# Patient Record
Sex: FEMALE | Race: WHITE | NOT HISPANIC OR LATINO
[De-identification: names, ages, dates, MRNs, and addresses within clinical notes are randomized per-mention and may not be internally consistent; named-entity substitution may affect disease eponyms.]

---

## 2017-01-09 ENCOUNTER — APPOINTMENT (OUTPATIENT)
Dept: ORTHOPEDIC SURGERY | Facility: CLINIC | Age: 57
End: 2017-01-09

## 2017-01-09 VITALS
DIASTOLIC BLOOD PRESSURE: 84 MMHG | BODY MASS INDEX: 39.65 KG/M2 | WEIGHT: 210 LBS | HEART RATE: 70 BPM | SYSTOLIC BLOOD PRESSURE: 117 MMHG | HEIGHT: 61 IN

## 2017-02-15 ENCOUNTER — OUTPATIENT (OUTPATIENT)
Dept: OUTPATIENT SERVICES | Facility: HOSPITAL | Age: 57
LOS: 1 days | End: 2017-02-15
Payer: MEDICAID

## 2017-02-15 VITALS
HEART RATE: 76 BPM | HEIGHT: 61 IN | RESPIRATION RATE: 16 BRPM | WEIGHT: 213.85 LBS | TEMPERATURE: 98 F | DIASTOLIC BLOOD PRESSURE: 82 MMHG | SYSTOLIC BLOOD PRESSURE: 140 MMHG

## 2017-02-15 DIAGNOSIS — Z01.818 ENCOUNTER FOR OTHER PREPROCEDURAL EXAMINATION: ICD-10-CM

## 2017-02-15 DIAGNOSIS — K46.9 UNSPECIFIED ABDOMINAL HERNIA WITHOUT OBSTRUCTION OR GANGRENE: Chronic | ICD-10-CM

## 2017-02-15 DIAGNOSIS — Z98.89 OTHER SPECIFIED POSTPROCEDURAL STATES: Chronic | ICD-10-CM

## 2017-02-15 DIAGNOSIS — S82.209P UNSPECIFIED FRACTURE OF SHAFT OF UNSPECIFIED TIBIA, SUBSEQUENT ENCOUNTER FOR CLOSED FRACTURE WITH MALUNION: Chronic | ICD-10-CM

## 2017-02-15 DIAGNOSIS — M17.11 UNILATERAL PRIMARY OSTEOARTHRITIS, RIGHT KNEE: ICD-10-CM

## 2017-02-15 DIAGNOSIS — Z90.49 ACQUIRED ABSENCE OF OTHER SPECIFIED PARTS OF DIGESTIVE TRACT: Chronic | ICD-10-CM

## 2017-02-15 LAB
ALBUMIN SERPL ELPH-MCNC: 4.2 G/DL — SIGNIFICANT CHANGE UP (ref 3.3–5.2)
ALP SERPL-CCNC: 161 U/L — HIGH (ref 40–120)
ALT FLD-CCNC: 28 U/L — SIGNIFICANT CHANGE UP
ANION GAP SERPL CALC-SCNC: 13 MMOL/L — SIGNIFICANT CHANGE UP (ref 5–17)
APTT BLD: 31 SEC — SIGNIFICANT CHANGE UP (ref 27.5–37.4)
AST SERPL-CCNC: 20 U/L — SIGNIFICANT CHANGE UP
BASOPHILS # BLD AUTO: 0 K/UL — SIGNIFICANT CHANGE UP (ref 0–0.2)
BASOPHILS NFR BLD AUTO: 0.5 % — SIGNIFICANT CHANGE UP (ref 0–2)
BILIRUB SERPL-MCNC: 0.4 MG/DL — SIGNIFICANT CHANGE UP (ref 0.4–2)
BLD GP AB SCN SERPL QL: SIGNIFICANT CHANGE UP
BUN SERPL-MCNC: 14 MG/DL — SIGNIFICANT CHANGE UP (ref 8–20)
CALCIUM SERPL-MCNC: 9.2 MG/DL — SIGNIFICANT CHANGE UP (ref 8.6–10.2)
CHLORIDE SERPL-SCNC: 100 MMOL/L — SIGNIFICANT CHANGE UP (ref 98–107)
CO2 SERPL-SCNC: 26 MMOL/L — SIGNIFICANT CHANGE UP (ref 22–29)
CREAT SERPL-MCNC: 0.48 MG/DL — LOW (ref 0.5–1.3)
EOSINOPHIL # BLD AUTO: 0.1 K/UL — SIGNIFICANT CHANGE UP (ref 0–0.5)
EOSINOPHIL NFR BLD AUTO: 1.5 % — SIGNIFICANT CHANGE UP (ref 0–6)
GLUCOSE SERPL-MCNC: 114 MG/DL — SIGNIFICANT CHANGE UP (ref 70–115)
HCT VFR BLD CALC: 43.2 % — SIGNIFICANT CHANGE UP (ref 37–47)
HGB BLD-MCNC: 14.7 G/DL — SIGNIFICANT CHANGE UP (ref 12–16)
INR BLD: 0.95 RATIO — SIGNIFICANT CHANGE UP (ref 0.88–1.16)
LYMPHOCYTES # BLD AUTO: 2 K/UL — SIGNIFICANT CHANGE UP (ref 1–4.8)
LYMPHOCYTES # BLD AUTO: 24.9 % — SIGNIFICANT CHANGE UP (ref 20–55)
MCHC RBC-ENTMCNC: 29.9 PG — SIGNIFICANT CHANGE UP (ref 27–31)
MCHC RBC-ENTMCNC: 34 G/DL — SIGNIFICANT CHANGE UP (ref 32–36)
MCV RBC AUTO: 88 FL — SIGNIFICANT CHANGE UP (ref 81–99)
MONOCYTES # BLD AUTO: 0.4 K/UL — SIGNIFICANT CHANGE UP (ref 0–0.8)
MONOCYTES NFR BLD AUTO: 5.2 % — SIGNIFICANT CHANGE UP (ref 3–10)
MRSA PCR RESULT.: SIGNIFICANT CHANGE UP
NEUTROPHILS # BLD AUTO: 5.5 K/UL — SIGNIFICANT CHANGE UP (ref 1.8–8)
NEUTROPHILS NFR BLD AUTO: 67.5 % — SIGNIFICANT CHANGE UP (ref 37–73)
PLATELET # BLD AUTO: 289 K/UL — SIGNIFICANT CHANGE UP (ref 150–400)
POTASSIUM SERPL-MCNC: 4.3 MMOL/L — SIGNIFICANT CHANGE UP (ref 3.5–5.3)
POTASSIUM SERPL-SCNC: 4.3 MMOL/L — SIGNIFICANT CHANGE UP (ref 3.5–5.3)
PROT SERPL-MCNC: 7.3 G/DL — SIGNIFICANT CHANGE UP (ref 6.6–8.7)
PROTHROM AB SERPL-ACNC: 10.4 SEC — SIGNIFICANT CHANGE UP (ref 10–13.1)
RBC # BLD: 4.91 M/UL — SIGNIFICANT CHANGE UP (ref 4.4–5.2)
RBC # FLD: 13.8 % — SIGNIFICANT CHANGE UP (ref 11–15.6)
S AUREUS DNA NOSE QL NAA+PROBE: SIGNIFICANT CHANGE UP
SODIUM SERPL-SCNC: 139 MMOL/L — SIGNIFICANT CHANGE UP (ref 135–145)
TYPE + AB SCN PNL BLD: SIGNIFICANT CHANGE UP
WBC # BLD: 8.1 K/UL — SIGNIFICANT CHANGE UP (ref 4.8–10.8)
WBC # FLD AUTO: 8.1 K/UL — SIGNIFICANT CHANGE UP (ref 4.8–10.8)

## 2017-02-15 PROCEDURE — 85730 THROMBOPLASTIN TIME PARTIAL: CPT

## 2017-02-15 PROCEDURE — 85610 PROTHROMBIN TIME: CPT

## 2017-02-15 PROCEDURE — 80053 COMPREHEN METABOLIC PANEL: CPT

## 2017-02-15 PROCEDURE — 85027 COMPLETE CBC AUTOMATED: CPT

## 2017-02-15 PROCEDURE — 87641 MR-STAPH DNA AMP PROBE: CPT

## 2017-02-15 PROCEDURE — 93010 ELECTROCARDIOGRAM REPORT: CPT

## 2017-02-15 PROCEDURE — 86850 RBC ANTIBODY SCREEN: CPT

## 2017-02-15 PROCEDURE — G0463: CPT

## 2017-02-15 PROCEDURE — 93005 ELECTROCARDIOGRAM TRACING: CPT

## 2017-02-15 PROCEDURE — 87640 STAPH A DNA AMP PROBE: CPT

## 2017-02-15 PROCEDURE — 86900 BLOOD TYPING SEROLOGIC ABO: CPT

## 2017-02-15 PROCEDURE — 86901 BLOOD TYPING SEROLOGIC RH(D): CPT

## 2017-02-15 NOTE — H&P PST ADULT - NSANTHOSAYNRD_GEN_A_CORE
No. RAY screening performed.  STOP BANG Legend: 0-2 = LOW Risk; 3-4 = INTERMEDIATE Risk; 5-8 = HIGH Risk

## 2017-02-15 NOTE — PATIENT PROFILE ADULT. - LEARNING ASSESSMENT (PATIENT) ADDITIONAL COMMENTS
Instructed pt on pre-op instructions, tips for safer surgery, pain management scale and pre-surgical infection prevention instructions given to pt and verbalized understanding of all. Ebola Screening: negative,

## 2017-02-15 NOTE — H&P PST ADULT - REASON FOR ADMISSION
I broke my tibia in April and I was still having a lot of pain I had surgery to take the metal out and the knee is shot.

## 2017-02-15 NOTE — PATIENT PROFILE ADULT. - PSH
Abdominal hernia    Closed fracture of tibia with malunion  s/p surgical repair; hardware removed 11/2016  H/O excision of mass  cysts right hand, R shoulder, L leg, R leg  History of cholecystectomy    History of colon surgery    S/P arthroscopic knee surgery  bilat  S/P carpal tunnel release  bilat  Status post left breast lumpectomy

## 2017-02-15 NOTE — H&P PST ADULT - FAMILY HISTORY
<<-----Click on this checkbox to enter Family History Family history of peritoneal cancer     Family history of prostate cancer     Child  Still living? Yes, Estimated age: 21-30  Family history of Hodgkin's lymphoma, Age at diagnosis: 11-20

## 2017-02-15 NOTE — PATIENT PROFILE ADULT. - VISION (WITH CORRECTIVE LENSES IF THE PATIENT USUALLY WEARS THEM):
Normal vision: sees adequately in most situations; can see medication labels, newsprint/glasses - distance

## 2017-02-15 NOTE — PATIENT PROFILE ADULT. - PRO PAIN LIFE ADAPT
withdrawal from social contact/inability to work/decreased activity level/inability or reluctance to perform ADLs/inability to enjoy life/inability to sleep/inability to concentrate

## 2017-02-16 DIAGNOSIS — M17.11 UNILATERAL PRIMARY OSTEOARTHRITIS, RIGHT KNEE: ICD-10-CM

## 2017-02-16 DIAGNOSIS — Z01.818 ENCOUNTER FOR OTHER PREPROCEDURAL EXAMINATION: ICD-10-CM

## 2017-02-17 ENCOUNTER — OTHER (OUTPATIENT)
Age: 57
End: 2017-02-17

## 2017-02-23 RX ORDER — GABAPENTIN 400 MG/1
300 CAPSULE ORAL ONCE
Qty: 0 | Refills: 0 | Status: COMPLETED | OUTPATIENT
Start: 2017-03-08 | End: 2017-03-08

## 2017-02-23 RX ORDER — OXYCODONE HYDROCHLORIDE 5 MG/1
20 TABLET ORAL ONCE
Qty: 0 | Refills: 0 | Status: DISCONTINUED | OUTPATIENT
Start: 2017-03-08 | End: 2017-03-08

## 2017-02-23 RX ORDER — CELECOXIB 200 MG/1
400 CAPSULE ORAL ONCE
Qty: 0 | Refills: 0 | Status: COMPLETED | OUTPATIENT
Start: 2017-03-08 | End: 2017-03-08

## 2017-02-23 RX ORDER — SCOPALAMINE 1 MG/3D
1.5 PATCH, EXTENDED RELEASE TRANSDERMAL ONCE
Qty: 0 | Refills: 0 | Status: COMPLETED | OUTPATIENT
Start: 2017-03-08 | End: 2017-03-08

## 2017-02-24 ENCOUNTER — APPOINTMENT (OUTPATIENT)
Dept: ORTHOPEDIC SURGERY | Facility: CLINIC | Age: 57
End: 2017-02-24

## 2017-03-06 ENCOUNTER — APPOINTMENT (OUTPATIENT)
Dept: ULTRASOUND IMAGING | Facility: CLINIC | Age: 57
End: 2017-03-06

## 2017-03-06 ENCOUNTER — OUTPATIENT (OUTPATIENT)
Dept: OUTPATIENT SERVICES | Facility: HOSPITAL | Age: 57
LOS: 1 days | End: 2017-03-06
Payer: MEDICAID

## 2017-03-06 ENCOUNTER — APPOINTMENT (OUTPATIENT)
Dept: MAMMOGRAPHY | Facility: CLINIC | Age: 57
End: 2017-03-06

## 2017-03-06 DIAGNOSIS — Z98.89 OTHER SPECIFIED POSTPROCEDURAL STATES: Chronic | ICD-10-CM

## 2017-03-06 DIAGNOSIS — S82.209P UNSPECIFIED FRACTURE OF SHAFT OF UNSPECIFIED TIBIA, SUBSEQUENT ENCOUNTER FOR CLOSED FRACTURE WITH MALUNION: Chronic | ICD-10-CM

## 2017-03-06 DIAGNOSIS — Z90.49 ACQUIRED ABSENCE OF OTHER SPECIFIED PARTS OF DIGESTIVE TRACT: Chronic | ICD-10-CM

## 2017-03-06 DIAGNOSIS — K46.9 UNSPECIFIED ABDOMINAL HERNIA WITHOUT OBSTRUCTION OR GANGRENE: Chronic | ICD-10-CM

## 2017-03-06 DIAGNOSIS — Z00.8 ENCOUNTER FOR OTHER GENERAL EXAMINATION: ICD-10-CM

## 2017-03-07 ENCOUNTER — RESULT REVIEW (OUTPATIENT)
Age: 57
End: 2017-03-07

## 2017-03-08 ENCOUNTER — APPOINTMENT (OUTPATIENT)
Dept: ORTHOPEDIC SURGERY | Facility: HOSPITAL | Age: 57
End: 2017-03-08

## 2017-03-08 ENCOUNTER — TRANSCRIPTION ENCOUNTER (OUTPATIENT)
Age: 57
End: 2017-03-08

## 2017-03-08 ENCOUNTER — INPATIENT (INPATIENT)
Facility: HOSPITAL | Age: 57
LOS: 0 days | Discharge: HOME | DRG: 470 | End: 2017-03-09
Attending: ORTHOPAEDIC SURGERY | Admitting: ORTHOPAEDIC SURGERY
Payer: MEDICARE

## 2017-03-08 VITALS
HEART RATE: 64 BPM | SYSTOLIC BLOOD PRESSURE: 126 MMHG | DIASTOLIC BLOOD PRESSURE: 75 MMHG | HEIGHT: 61 IN | WEIGHT: 213.85 LBS | RESPIRATION RATE: 16 BRPM | OXYGEN SATURATION: 98 % | TEMPERATURE: 97 F

## 2017-03-08 DIAGNOSIS — M17.5 OTHER UNILATERAL SECONDARY OSTEOARTHRITIS OF KNEE: ICD-10-CM

## 2017-03-08 DIAGNOSIS — Z98.89 OTHER SPECIFIED POSTPROCEDURAL STATES: Chronic | ICD-10-CM

## 2017-03-08 DIAGNOSIS — Z90.49 ACQUIRED ABSENCE OF OTHER SPECIFIED PARTS OF DIGESTIVE TRACT: Chronic | ICD-10-CM

## 2017-03-08 DIAGNOSIS — S82.209P UNSPECIFIED FRACTURE OF SHAFT OF UNSPECIFIED TIBIA, SUBSEQUENT ENCOUNTER FOR CLOSED FRACTURE WITH MALUNION: Chronic | ICD-10-CM

## 2017-03-08 DIAGNOSIS — M17.11 UNILATERAL PRIMARY OSTEOARTHRITIS, RIGHT KNEE: ICD-10-CM

## 2017-03-08 DIAGNOSIS — M06.9 RHEUMATOID ARTHRITIS, UNSPECIFIED: ICD-10-CM

## 2017-03-08 DIAGNOSIS — F41.9 ANXIETY DISORDER, UNSPECIFIED: ICD-10-CM

## 2017-03-08 DIAGNOSIS — K46.9 UNSPECIFIED ABDOMINAL HERNIA WITHOUT OBSTRUCTION OR GANGRENE: Chronic | ICD-10-CM

## 2017-03-08 LAB
BLD GP AB SCN SERPL QL: SIGNIFICANT CHANGE UP
TYPE + AB SCN PNL BLD: SIGNIFICANT CHANGE UP

## 2017-03-08 PROCEDURE — 20680 REMOVAL OF IMPLANT DEEP: CPT | Mod: 59

## 2017-03-08 PROCEDURE — 88305 TISSUE EXAM BY PATHOLOGIST: CPT | Mod: 26

## 2017-03-08 PROCEDURE — 88300 SURGICAL PATH GROSS: CPT | Mod: 26,59

## 2017-03-08 PROCEDURE — 73560 X-RAY EXAM OF KNEE 1 OR 2: CPT | Mod: 26,RT

## 2017-03-08 PROCEDURE — 88311 DECALCIFY TISSUE: CPT | Mod: 26

## 2017-03-08 PROCEDURE — 99222 1ST HOSP IP/OBS MODERATE 55: CPT

## 2017-03-08 PROCEDURE — 27447 TOTAL KNEE ARTHROPLASTY: CPT | Mod: AS,RT

## 2017-03-08 PROCEDURE — 76000 FLUOROSCOPY <1 HR PHYS/QHP: CPT | Mod: 26,59

## 2017-03-08 PROCEDURE — 27447 TOTAL KNEE ARTHROPLASTY: CPT | Mod: RT

## 2017-03-08 RX ORDER — SODIUM CHLORIDE 9 MG/ML
1000 INJECTION, SOLUTION INTRAVENOUS
Qty: 0 | Refills: 0 | Status: DISCONTINUED | OUTPATIENT
Start: 2017-03-08 | End: 2017-03-08

## 2017-03-08 RX ORDER — FENTANYL CITRATE 50 UG/ML
25 INJECTION INTRAVENOUS
Qty: 0 | Refills: 0 | Status: DISCONTINUED | OUTPATIENT
Start: 2017-03-08 | End: 2017-03-08

## 2017-03-08 RX ORDER — CEFAZOLIN SODIUM 1 G
2000 VIAL (EA) INJECTION ONCE
Qty: 0 | Refills: 0 | Status: DISCONTINUED | OUTPATIENT
Start: 2017-03-08 | End: 2017-03-08

## 2017-03-08 RX ORDER — FERROUS SULFATE 325(65) MG
325 TABLET ORAL
Qty: 0 | Refills: 0 | Status: DISCONTINUED | OUTPATIENT
Start: 2017-03-08 | End: 2017-03-09

## 2017-03-08 RX ORDER — ACETAMINOPHEN 500 MG
975 TABLET ORAL EVERY 8 HOURS
Qty: 0 | Refills: 0 | Status: DISCONTINUED | OUTPATIENT
Start: 2017-03-09 | End: 2017-03-09

## 2017-03-08 RX ORDER — CELECOXIB 200 MG/1
200 CAPSULE ORAL
Qty: 0 | Refills: 0 | Status: DISCONTINUED | OUTPATIENT
Start: 2017-03-09 | End: 2017-03-09

## 2017-03-08 RX ORDER — CEFAZOLIN SODIUM 1 G
2000 VIAL (EA) INJECTION
Qty: 0 | Refills: 0 | Status: COMPLETED | OUTPATIENT
Start: 2017-03-08 | End: 2017-03-08

## 2017-03-08 RX ORDER — SENNA PLUS 8.6 MG/1
2 TABLET ORAL AT BEDTIME
Qty: 0 | Refills: 0 | Status: DISCONTINUED | OUTPATIENT
Start: 2017-03-08 | End: 2017-03-09

## 2017-03-08 RX ORDER — TRANEXAMIC ACID 100 MG/ML
950 INJECTION, SOLUTION INTRAVENOUS ONCE
Qty: 0 | Refills: 0 | Status: DISCONTINUED | OUTPATIENT
Start: 2017-03-08 | End: 2017-03-08

## 2017-03-08 RX ORDER — ACETAMINOPHEN 500 MG
1000 TABLET ORAL
Qty: 0 | Refills: 0 | Status: COMPLETED | OUTPATIENT
Start: 2017-03-08 | End: 2017-03-09

## 2017-03-08 RX ORDER — ESCITALOPRAM OXALATE 10 MG/1
20 TABLET, FILM COATED ORAL DAILY
Qty: 0 | Refills: 0 | Status: DISCONTINUED | OUTPATIENT
Start: 2017-03-08 | End: 2017-03-09

## 2017-03-08 RX ORDER — MAGNESIUM HYDROXIDE 400 MG/1
30 TABLET, CHEWABLE ORAL DAILY
Qty: 0 | Refills: 0 | Status: DISCONTINUED | OUTPATIENT
Start: 2017-03-08 | End: 2017-03-09

## 2017-03-08 RX ORDER — ENOXAPARIN SODIUM 100 MG/ML
30 INJECTION SUBCUTANEOUS EVERY 12 HOURS
Qty: 0 | Refills: 0 | Status: DISCONTINUED | OUTPATIENT
Start: 2017-03-09 | End: 2017-03-09

## 2017-03-08 RX ORDER — SODIUM CHLORIDE 9 MG/ML
3 INJECTION INTRAMUSCULAR; INTRAVENOUS; SUBCUTANEOUS EVERY 8 HOURS
Qty: 0 | Refills: 0 | Status: DISCONTINUED | OUTPATIENT
Start: 2017-03-08 | End: 2017-03-08

## 2017-03-08 RX ORDER — KETOROLAC TROMETHAMINE 30 MG/ML
15 SYRINGE (ML) INJECTION EVERY 6 HOURS
Qty: 0 | Refills: 0 | Status: DISCONTINUED | OUTPATIENT
Start: 2017-03-08 | End: 2017-03-09

## 2017-03-08 RX ORDER — ACETAMINOPHEN 500 MG
1000 TABLET ORAL ONCE
Qty: 0 | Refills: 0 | Status: DISCONTINUED | OUTPATIENT
Start: 2017-03-08 | End: 2017-03-08

## 2017-03-08 RX ORDER — GABAPENTIN 400 MG/1
100 CAPSULE ORAL
Qty: 0 | Refills: 0 | Status: DISCONTINUED | OUTPATIENT
Start: 2017-03-08 | End: 2017-03-09

## 2017-03-08 RX ORDER — CLONAZEPAM 1 MG
1 TABLET ORAL
Qty: 0 | Refills: 0 | Status: DISCONTINUED | OUTPATIENT
Start: 2017-03-08 | End: 2017-03-09

## 2017-03-08 RX ORDER — HYDROMORPHONE HYDROCHLORIDE 2 MG/ML
0.5 INJECTION INTRAMUSCULAR; INTRAVENOUS; SUBCUTANEOUS
Qty: 0 | Refills: 0 | Status: DISCONTINUED | OUTPATIENT
Start: 2017-03-08 | End: 2017-03-09

## 2017-03-08 RX ORDER — OXYCODONE HYDROCHLORIDE 5 MG/1
10 TABLET ORAL
Qty: 0 | Refills: 0 | Status: DISCONTINUED | OUTPATIENT
Start: 2017-03-08 | End: 2017-03-09

## 2017-03-08 RX ORDER — DOCUSATE SODIUM 100 MG
100 CAPSULE ORAL THREE TIMES A DAY
Qty: 0 | Refills: 0 | Status: DISCONTINUED | OUTPATIENT
Start: 2017-03-08 | End: 2017-03-09

## 2017-03-08 RX ORDER — VANCOMYCIN HCL 1 G
1500 VIAL (EA) INTRAVENOUS ONCE
Qty: 0 | Refills: 0 | Status: COMPLETED | OUTPATIENT
Start: 2017-03-08 | End: 2017-03-08

## 2017-03-08 RX ORDER — ONDANSETRON 8 MG/1
4 TABLET, FILM COATED ORAL EVERY 6 HOURS
Qty: 0 | Refills: 0 | Status: DISCONTINUED | OUTPATIENT
Start: 2017-03-08 | End: 2017-03-09

## 2017-03-08 RX ORDER — OXYCODONE HYDROCHLORIDE 5 MG/1
5 TABLET ORAL
Qty: 0 | Refills: 0 | Status: DISCONTINUED | OUTPATIENT
Start: 2017-03-08 | End: 2017-03-09

## 2017-03-08 RX ORDER — ONDANSETRON 8 MG/1
4 TABLET, FILM COATED ORAL ONCE
Qty: 0 | Refills: 0 | Status: DISCONTINUED | OUTPATIENT
Start: 2017-03-08 | End: 2017-03-08

## 2017-03-08 RX ORDER — SIMVASTATIN 20 MG/1
40 TABLET, FILM COATED ORAL AT BEDTIME
Qty: 0 | Refills: 0 | Status: DISCONTINUED | OUTPATIENT
Start: 2017-03-08 | End: 2017-03-09

## 2017-03-08 RX ORDER — VANCOMYCIN HCL 1 G
1000 VIAL (EA) INTRAVENOUS
Qty: 0 | Refills: 0 | Status: COMPLETED | OUTPATIENT
Start: 2017-03-08 | End: 2017-03-08

## 2017-03-08 RX ORDER — OXYCODONE HYDROCHLORIDE 5 MG/1
10 TABLET ORAL EVERY 12 HOURS
Qty: 0 | Refills: 0 | Status: DISCONTINUED | OUTPATIENT
Start: 2017-03-08 | End: 2017-03-09

## 2017-03-08 RX ORDER — FOLIC ACID 0.8 MG
1 TABLET ORAL DAILY
Qty: 0 | Refills: 0 | Status: DISCONTINUED | OUTPATIENT
Start: 2017-03-08 | End: 2017-03-09

## 2017-03-08 RX ADMIN — Medication 300 MILLIGRAM(S): at 09:07

## 2017-03-08 RX ADMIN — Medication 1 MILLIGRAM(S): at 17:15

## 2017-03-08 RX ADMIN — Medication 100 MILLIGRAM(S): at 17:16

## 2017-03-08 RX ADMIN — CELECOXIB 400 MILLIGRAM(S): 200 CAPSULE ORAL at 08:42

## 2017-03-08 RX ADMIN — SCOPALAMINE 1.5 MILLIGRAM(S): 1 PATCH, EXTENDED RELEASE TRANSDERMAL at 08:47

## 2017-03-08 RX ADMIN — OXYCODONE HYDROCHLORIDE 10 MILLIGRAM(S): 5 TABLET ORAL at 17:14

## 2017-03-08 RX ADMIN — Medication 15 MILLIGRAM(S): at 17:30

## 2017-03-08 RX ADMIN — Medication 15 MILLIGRAM(S): at 17:15

## 2017-03-08 RX ADMIN — GABAPENTIN 100 MILLIGRAM(S): 400 CAPSULE ORAL at 19:25

## 2017-03-08 RX ADMIN — Medication 325 MILLIGRAM(S): at 17:15

## 2017-03-08 RX ADMIN — GABAPENTIN 300 MILLIGRAM(S): 400 CAPSULE ORAL at 08:42

## 2017-03-08 RX ADMIN — OXYCODONE HYDROCHLORIDE 20 MILLIGRAM(S): 5 TABLET ORAL at 08:42

## 2017-03-08 RX ADMIN — Medication 250 MILLIGRAM(S): at 21:39

## 2017-03-08 RX ADMIN — OXYCODONE HYDROCHLORIDE 10 MILLIGRAM(S): 5 TABLET ORAL at 18:00

## 2017-03-08 NOTE — PROGRESS NOTE ADULT - SUBJECTIVE AND OBJECTIVE BOX
Ortho Post Op Check    Name: MARC CASTRO    MR #: [id number]    Procedure: Right Total Knee Arthroplasty  Surgeon: Ramin Heredia    Pt comfortable without complaints, pain controlled, found laying in bed, doing well. Had first session of PT in PACU  Denies CP, SOB, N/V, numbness/tingling     General Exam:  Vital Signs Last 24 Hrs  T(C): 36.6, Max: 37.3 (03-08 @ 14:20)  T(F): 97.8, Max: 99.1 (03-08 @ 14:20)  HR: 86 (70 - 91)  BP: 99/66 (99/66 - 139/68)  BP(mean): 114 (114 - 114)  RR: 16 (11 - 17)  SpO2: 99% (93% - 99%)  Wt(kg): --    General: Pt Alert and oriented, NAD, controlled pain.  Dressings C/D/I. No bleeding.  Pulses: 2+ dorsalis pedis pulse. Cap refill < 2 sec.  Sensation: Has some decrease sensation, however states present prior to surgery and no change since.  Motor: + EHL/FHL/TA/GS    Post-op X-Ray:   EXAM:  KNEE-RIGHT                          PROCEDURE DATE:  03/08/2017        INTERPRETATION:  Right knee    Postop AP and crosstable lateral views    Status post right total knee replacement. The alignment is normal.   Prosthetic components are in good position. Gas and fluid is present in   the suprapatellar recess.    Impression: Right total knee replacement in good position    ISMAEL RUIZ M.D., ATTENDING RADIOLOGIST  This document has been electronically signed. Mar  8 25359:40PM    A/P: 57yFemale POD#0 s/p Right Total Knee Arthroplasty  - Stable  - Pain Control  - DVT ppx: Lovenox 30mg BID, SCDs  - Post op abx: Ancef, Vancomycin  - Continue with present PT protocol for Total Knee Replacement  - Weight bearing status: WBAT  - D/C Planning (home) Ortho Post Op Check    Name: MARC CASTRO    MR #: [id number]    Procedure: Right Total Knee Arthroplasty  Surgeon: Ramin Heredia    Pt comfortable without complaints, pain controlled, found laying in bed, doing well. Had first session of PT in PACU  Denies CP, SOB, N/V, numbness/tingling     General Exam:  Vital Signs Last 24 Hrs  T(C): 36.6, Max: 37.3 (03-08 @ 14:20)  T(F): 97.8, Max: 99.1 (03-08 @ 14:20)  HR: 86 (70 - 91)  BP: 99/66 (99/66 - 139/68)  BP(mean): 114 (114 - 114)  RR: 16 (11 - 17)  SpO2: 99% (93% - 99%)  Wt(kg): --    General: Pt Alert and oriented, NAD, controlled pain.  Dressings C/D/I. No bleeding.  Pulses: 2+ dorsalis pedis pulse. Cap refill < 2 sec.  Sensation: Has some decrease sensation, however states present prior to surgery and no change since.  Motor: + EHL/FHL/TA/GS    Post-op X-Ray:   EXAM:  KNEE-RIGHT                          PROCEDURE DATE:  03/08/2017        INTERPRETATION:  Right knee    Postop AP and crosstable lateral views    Status post right total knee replacement. The alignment is normal.   Prosthetic components are in good position. Gas and fluid is present in   the suprapatellar recess.    Impression: Right total knee replacement in good position    ISMAEL RUIZ M.D., ATTENDING RADIOLOGIST  This document has been electronically signed. Mar  8 01047:40PM    A/P: 57yFemale POD#0 s/p Right Total Knee Arthroplasty  - Stable  - Pain Control  - DVT ppx: Lovenox 30mg BID, SCDs  - Post op abx: Ancef, Vancomycin  - Continue with present PT protocol for Total Knee Replacement  - Weight bearing status: WBAT  - D/C Planning (home)

## 2017-03-08 NOTE — PHYSICAL THERAPY INITIAL EVALUATION ADULT - ACTIVE RANGE OF MOTION EXAMINATION, REHAB EVAL
deficits as listed below/bilateral  lower extremity Active ROM was WFL (within functional limits)/R knee flexion to 45deg/bilateral upper extremity Active ROM was WFL (within functional limits)

## 2017-03-08 NOTE — DISCHARGE NOTE ADULT - CARE PLAN
Principal Discharge DX:	Primary osteoarthritis of right knee  Goal:	Decrease Pain, Improve Ambulation and Activities of Daily Living  Instructions for follow-up, activity and diet:	The patient will be seen in the office between 2-3 weeks for wound check.  Patient may shower after post-op day #5. The dressing is to be removed on day # 10 (ten).  The patient will contact the office if the wound becomes red, has increasing pain, develops bleeding or discharge, an injury occurs, or has other concerns. The patient will continue PT consistent with total knee replacement. The patient will continue LOVENOX for 2 weeks and then begin ASPIRIN for DVTP. The patient will take OXYCODONE and TYLENOL for pain control and titrate according to prescription and patient needs. The patient is FULL weight bearing. Elevation of the lower leg is recommended to reduce swelling. Principal Discharge DX:	Primary osteoarthritis of right knee  Goal:	Decrease Pain, Improve Ambulation and Activities of Daily Living  Instructions for follow-up, activity and diet:	The patient will be seen in the office in 2 weeks for wound check. Sutures/Staples/Tape will be removed at that time. Patient may shower after post-op day #3 (3/11/17). The dressing is to be removed on 3/15/17. IF THE DRESSING BECOMES SOILED BEFORE THE REMOVAL DATE, CHANGE WITH A SIMILAR DRESSING. IF THE DRESSING BECOMES STAINED WITH DISCHARGE, CONTACT THE OFFICE FOR FURTHER DIRECTIONS. The patient will contact the office if the wound becomes red, has increasing pain, develops bleeding or discharge, an injury occurs, or has other concerns. The patient will continue PT consistent with total knee replacement. The patient will continue LOVENOX for 2 weeks and then begin 325mg TWICE daily for 4 weeks for blood clot prevention. The patient will take OXYCODONE AND TYLENOL for pain control and titrate according to prescription and patient needs. The patient will take Colace while taking oxycodone to prevent narcotic associated constipation.  Additionally, increase water intake (drink at least 8 glasses of water daily) and try adding fiber to the diet by eating fruits, vegetables and foods that are rich in grains. If constipation is experienced, contact the medical/primary care provider to discuss further treatment options. The patient is FULL weight bearing. Elevation of the lower leg is recommended to reduce swelling. Principal Discharge DX:	Primary osteoarthritis of right knee  Goal:	Decrease Pain, Improve Ambulation and Activities of Daily Living  Instructions for follow-up, activity and diet:	The patient will be seen in the office in 2 weeks for wound check. Sutures/Staples/Tape will be removed at that time. Patient may shower after post-op day #3 (3/11/17). The dressing is to be removed on 3/15/17. IF THE DRESSING BECOMES SOILED BEFORE THE REMOVAL DATE, CHANGE WITH A SIMILAR DRESSING. IF THE DRESSING BECOMES STAINED WITH DISCHARGE, CONTACT THE OFFICE FOR FURTHER DIRECTIONS. The patient will contact the office if the wound becomes red, has increasing pain, develops bleeding or discharge, an injury occurs, or has other concerns. The patient will continue PT consistent with total knee replacement. The patient will continue LOVENOX for 2 weeks and then begin ASPIRIN 325mg TWICE daily for 4 weeks for blood clot prevention. The patient will take OXYCODONE AND TYLENOL for pain control and titrate according to prescription and patient needs. The patient will take Colace while taking oxycodone to prevent narcotic associated constipation.  Additionally, increase water intake (drink at least 8 glasses of water daily) and try adding fiber to the diet by eating fruits, vegetables and foods that are rich in grains. If constipation is experienced, contact the medical/primary care provider to discuss further treatment options. The patient is FULL weight bearing. Elevation of the lower leg is recommended to reduce swelling.

## 2017-03-08 NOTE — PROGRESS NOTE ADULT - ATTENDING COMMENTS
Doing well this morning.  Right knee dressing c/d/i, NVID RLE.  Pain well controlled.  Plan for dc home once cleared by medicine and PT - today or tomorrow.  Lovenox DVT ppx.

## 2017-03-08 NOTE — PHYSICAL THERAPY INITIAL EVALUATION ADULT - ADDITIONAL COMMENTS
Pt [place] in a ranch with 4 steps to enter, no HR. She has a cane, RW, crutches, commode and shower chair.

## 2017-03-08 NOTE — CONSULT NOTE ADULT - SUBJECTIVE AND OBJECTIVE BOX
PMD :Dr Staton         HPI:  57 yo female with osteoarthritis right knee after ORIF R tibia, planning total joint replacement. (15 Feb 2017 08:26)      PAST MEDICAL & SURGICAL HISTORY:  Dyslipidemia  Rheumatoid arthritis  Anxiety  Colon cancer  Status post left breast lumpectomy  History of cholecystectomy  Closed fracture of tibia with malunion: s/p surgical repair; hardware removed 11/2016  H/O excision of mass: cysts right hand, R shoulder, L leg, R leg  S/P carpal tunnel release: bilat  S/P arthroscopic knee surgery: bilat  Abdominal hernia  History of colon surgery      Social History:  Tabacco -   ETOH -   Illicit drug abuse - denies    FAMILY HISTORY:  Family history of Hodgkin&#x27;s lymphoma (Child)  Family history of prostate cancer  Family history of peritoneal cancer      Allergies    No Known Allergies    Intolerances        HOME MEDICATIONS :     REVIEW OF SYSTEMS:      MEDICATIONS  (STANDING):  ketorolac   Injectable 15milliGRAM(s) IV Push every 6 hours  ceFAZolin   IVPB 2000milliGRAM(s) IV Intermittent <User Schedule>  vancomycin  IVPB 1000milliGRAM(s) IV Intermittent <User Schedule>  docusate sodium 100milliGRAM(s) Oral three times a day  ferrous    sulfate 325milliGRAM(s) Oral three times a day with meals  folic acid 1milliGRAM(s) Oral daily  multivitamin 1Tablet(s) Oral daily  oxyCODONE ER Tablet 10milliGRAM(s) Oral every 12 hours  acetaminophen  IVPB. 1000milliGRAM(s) IV Intermittent <User Schedule>  pregabalin 75milliGRAM(s) Oral two times a day  escitalopram 20milliGRAM(s) Oral daily  simvastatin 40milliGRAM(s) Oral at bedtime  lactated ringers. 1000milliLiter(s) IV Continuous <Continuous>    MEDICATIONS  (PRN):  oxyCODONE IR 5milliGRAM(s) Oral every 3 hours PRN Mild Pain  oxyCODONE IR 10milliGRAM(s) Oral every 3 hours PRN Moderate Pain  HYDROmorphone  Injectable 0.5milliGRAM(s) IV Push every 3 hours PRN Severe Pain  aluminum hydroxide/magnesium hydroxide/simethicone Suspension 30milliLiter(s) Oral four times a day PRN Indigestion  ondansetron Injectable 4milliGRAM(s) IV Push every 6 hours PRN Nausea and/or Vomiting  magnesium hydroxide Suspension 30milliLiter(s) Oral daily PRN Constipation  senna 2Tablet(s) Oral at bedtime PRN Constipation  clonazePAM Tablet 1milliGRAM(s) Oral two times a day PRN anxiety  fentaNYL    Injectable 25MICROGram(s) IV Push every 5 minutes PRN Moderate Pain  ondansetron Injectable 4milliGRAM(s) IV Push once PRN Nausea and/or Vomiting      Vital Signs Last 24 Hrs  T(C): 37.3, Max: 37.3 (03-08 @ 14:20)  T(F): 99.1, Max: 99.1 (03-08 @ 14:20)  HR: 83 (64 - 91)  BP: 139/68 (126/75 - 139/68)  BP(mean): --  RR: 15 (15 - 17)  SpO2: 94% (93% - 98%)    PHYSICAL EXAM:    GENERAL: NAD, well-groomed, well-developed  HEAD:  Atraumatic, Normocephalic  EYES: EOMI, PERRLA, conjunctiva and sclera clear  NECK: Supple, No JVD, Normal thyroid  NERVOUS SYSTEM:  Alert & Oriented X3, Good concentration;   CHEST/LUNG: CTA  b/l,  no rales, rhonchi, wheezing, or rubs  HEART: Regular rate and rhythm; No murmurs, rubs, or gallops  ABDOMEN: Soft, Nontender, Nondistended; Bowel sounds present  EXTREMITIES:  2+ Peripheral Pulses, No clubbing, cyanosis, or edema ,   LYMPH: No lymphadenopathy noted  SKIN: No rashes or lesions    LABS:                  RADIOLOGY & ADDITIONAL STUDIES: PMD :Dr Staton     CC : knee pain     HPI:  57 yo female with h/o fibromyalgia , osteoarthritis right knee h/o ORIF of  R tibia last april secondary to fracture has been experiencing knee pain worsening over the last few months more on walking and limiting her daily activities .She was seen by Dr Heredia had in nov surgery on the right leg hardware removed and advised to get her knee total joint replacement. Seen post op in recovery room s/p spinal anesteshia .      PAST MEDICAL & SURGICAL HISTORY:  Dyslipidemia  Fibromyalgia  neuropathy   Rheumatoid arthritis  Anxiety  Colon cancer  Status post left breast lumpectomy  History of cholecystectomy  Closed fracture of tibia with malunion: s/p surgical repair; hardware removed 11/2016  H/O excision of mass: cysts right hand, R shoulder, L leg, R leg  S/P carpal tunnel release: bilat  S/P arthroscopic knee surgery: bilat  Abdominal hernia  History of colon surgery      Social History:  Tabacco - no smoking  ETOH - no alcohol use   Illicit drug abuse - denies    FAMILY HISTORY:  Family history of Hodgkin&#x27;s lymphoma (Child)  Family history of prostate cancer  Family history of peritoneal cancer      Allergies    No Known Allergies    Intolerances        HOME MEDICATIONS :     REVIEW OF SYSTEMS:  toe pain burning , knee pain , body aches anxiety otherwise negative       MEDICATIONS  (STANDING):  ketorolac   Injectable 15milliGRAM(s) IV Push every 6 hours  ceFAZolin   IVPB 2000milliGRAM(s) IV Intermittent <User Schedule>  vancomycin  IVPB 1000milliGRAM(s) IV Intermittent <User Schedule>  docusate sodium 100milliGRAM(s) Oral three times a day  ferrous    sulfate 325milliGRAM(s) Oral three times a day with meals  folic acid 1milliGRAM(s) Oral daily  multivitamin 1Tablet(s) Oral daily  oxyCODONE ER Tablet 10milliGRAM(s) Oral every 12 hours  acetaminophen  IVPB. 1000milliGRAM(s) IV Intermittent <User Schedule>  pregabalin 75milliGRAM(s) Oral two times a day  escitalopram 20milliGRAM(s) Oral daily  simvastatin 40milliGRAM(s) Oral at bedtime  lactated ringers. 1000milliLiter(s) IV Continuous <Continuous>    MEDICATIONS  (PRN):  oxyCODONE IR 5milliGRAM(s) Oral every 3 hours PRN Mild Pain  oxyCODONE IR 10milliGRAM(s) Oral every 3 hours PRN Moderate Pain  HYDROmorphone  Injectable 0.5milliGRAM(s) IV Push every 3 hours PRN Severe Pain  aluminum hydroxide/magnesium hydroxide/simethicone Suspension 30milliLiter(s) Oral four times a day PRN Indigestion  ondansetron Injectable 4milliGRAM(s) IV Push every 6 hours PRN Nausea and/or Vomiting  magnesium hydroxide Suspension 30milliLiter(s) Oral daily PRN Constipation  senna 2Tablet(s) Oral at bedtime PRN Constipation  clonazePAM Tablet 1milliGRAM(s) Oral two times a day PRN anxiety  fentaNYL    Injectable 25MICROGram(s) IV Push every 5 minutes PRN Moderate Pain  ondansetron Injectable 4milliGRAM(s) IV Push once PRN Nausea and/or Vomiting      Vital Signs Last 24 Hrs  T(C): 37.3, Max: 37.3 (03-08 @ 14:20)  T(F): 99.1, Max: 99.1 (03-08 @ 14:20)  HR: 83 (64 - 91)  BP: 139/68 (126/75 - 139/68)  BP(mean): --  RR: 15 (15 - 17)  SpO2: 94% (93% - 98%)    PHYSICAL EXAM:    GENERAL: NAD, well-groomed, well-developed  HEAD:  Atraumatic, Normocephalic  EYES: EOMI, PERRLA, conjunctiva and sclera clear  NECK: Supple, No JVD, Normal thyroid  NERVOUS SYSTEM:  Alert & Oriented X3, Good concentration; no focal deficits moves all extremities   CHEST/LUNG: CTA  b/l,  no rales, rhonchi, wheezing, or rubs  HEART: Regular rate and rhythm; No murmurs, rubs, or gallops  ABDOMEN: Soft, Nontender, Nondistended; Bowel sounds present  EXTREMITIES:  2+ Peripheral Pulses, No clubbing, cyanosis, or edema ,   LYMPH: No lymphadenopathy noted  SKIN: No rashes or lesions    LABS:  preop reviewed                   RADIOLOGY & ADDITIONAL STUDIES:

## 2017-03-08 NOTE — DISCHARGE NOTE ADULT - NS AS ACTIVITY OBS
No Heavy lifting/straining/Showering allowed/Walking-Indoors allowed/Walking-Outdoors allowed/Stairs allowed/Do not drive or operate machinery

## 2017-03-08 NOTE — DISCHARGE NOTE ADULT - CARE PROVIDER_API CALL
Ramin Heredia), Orthopaedic Surgery  [address] [place] [id number]  Phone: [phone]  Fax: [phone]

## 2017-03-08 NOTE — DISCHARGE NOTE ADULT - PLAN OF CARE
Decrease Pain, Improve Ambulation and Activities of Daily Living The patient will be seen in the office between 2-3 weeks for wound check.  Patient may shower after post-op day #5. The dressing is to be removed on day # 10 (ten).  The patient will contact the office if the wound becomes red, has increasing pain, develops bleeding or discharge, an injury occurs, or has other concerns. The patient will continue PT consistent with total knee replacement. The patient will continue LOVENOX for 2 weeks and then begin ASPIRIN for DVTP. The patient will take OXYCODONE and TYLENOL for pain control and titrate according to prescription and patient needs. The patient is FULL weight bearing. Elevation of the lower leg is recommended to reduce swelling. The patient will be seen in the office in 2 weeks for wound check. Sutures/Staples/Tape will be removed at that time. Patient may shower after post-op day #3 (3/11/17). The dressing is to be removed on 3/15/17. IF THE DRESSING BECOMES SOILED BEFORE THE REMOVAL DATE, CHANGE WITH A SIMILAR DRESSING. IF THE DRESSING BECOMES STAINED WITH DISCHARGE, CONTACT THE OFFICE FOR FURTHER DIRECTIONS. The patient will contact the office if the wound becomes red, has increasing pain, develops bleeding or discharge, an injury occurs, or has other concerns. The patient will continue PT consistent with total knee replacement. The patient will continue LOVENOX for 2 weeks and then begin 325mg TWICE daily for 4 weeks for blood clot prevention. The patient will take OXYCODONE AND TYLENOL for pain control and titrate according to prescription and patient needs. The patient will take Colace while taking oxycodone to prevent narcotic associated constipation.  Additionally, increase water intake (drink at least 8 glasses of water daily) and try adding fiber to the diet by eating fruits, vegetables and foods that are rich in grains. If constipation is experienced, contact the medical/primary care provider to discuss further treatment options. The patient is FULL weight bearing. Elevation of the lower leg is recommended to reduce swelling. The patient will be seen in the office in 2 weeks for wound check. Sutures/Staples/Tape will be removed at that time. Patient may shower after post-op day #3 (3/11/17). The dressing is to be removed on 3/15/17. IF THE DRESSING BECOMES SOILED BEFORE THE REMOVAL DATE, CHANGE WITH A SIMILAR DRESSING. IF THE DRESSING BECOMES STAINED WITH DISCHARGE, CONTACT THE OFFICE FOR FURTHER DIRECTIONS. The patient will contact the office if the wound becomes red, has increasing pain, develops bleeding or discharge, an injury occurs, or has other concerns. The patient will continue PT consistent with total knee replacement. The patient will continue LOVENOX for 2 weeks and then begin ASPIRIN 325mg TWICE daily for 4 weeks for blood clot prevention. The patient will take OXYCODONE AND TYLENOL for pain control and titrate according to prescription and patient needs. The patient will take Colace while taking oxycodone to prevent narcotic associated constipation.  Additionally, increase water intake (drink at least 8 glasses of water daily) and try adding fiber to the diet by eating fruits, vegetables and foods that are rich in grains. If constipation is experienced, contact the medical/primary care provider to discuss further treatment options. The patient is FULL weight bearing. Elevation of the lower leg is recommended to reduce swelling.

## 2017-03-08 NOTE — DISCHARGE NOTE ADULT - MEDICATION SUMMARY - MEDICATIONS TO TAKE
I will START or STAY ON the medications listed below when I get home from the hospital:    oxyCODONE 5 mg oral tablet  -- 1-2 tab(s) by mouth every 4 to 6 hours as needed MDD:8  -- Indication: For Pain medication    Aspirin Enteric Coated 325 mg oral delayed release tablet  -- 1 tab(s) by mouth 2 times a day x 4 weeks  -- Swallow whole.  Do not crush.  Take with food or milk.    -- Indication: For Dvt propx    Lovenox 30 mg/0.3 mL injectable solution  -- 30 milligram(s) injectable 2 times a day x 13 days, then begin aspirin  -- It is very important that you take or use this exactly as directed.  Do not skip doses or discontinue unless directed by your doctor.    -- Indication: For Dvt propx    Lyrica 75 mg oral capsule  -- 1 cap(s) by mouth 2 times a day  -- Indication: For home med    KlonoPIN 1 mg oral tablet  -- 1  by mouth 2 times a day  -- Indication: For home med    Lexapro 20 mg oral tablet  -- 1 tab(s) by mouth once a day  -- Indication: For home med    simvastatin 40 mg oral tablet  -- 1 tab(s) by mouth once a day (at bedtime)  -- Indication: For home med    docusate sodium 100 mg oral capsule  -- 1 cap(s) by mouth 3 times a day  -- Indication: For constipation    Vitamin D3 50,000 intl units oral capsule  -- 1 cap(s) by mouth once a week  -- Indication: For home med

## 2017-03-08 NOTE — DISCHARGE NOTE ADULT - PATIENT PORTAL LINK FT
“You can access the Follow[employer] Patient Portal, offered by [facility], by registering with the following website: [email or web address]”

## 2017-03-09 ENCOUNTER — TRANSCRIPTION ENCOUNTER (OUTPATIENT)
Age: 57
End: 2017-03-09

## 2017-03-09 ENCOUNTER — OTHER (OUTPATIENT)
Age: 57
End: 2017-03-09

## 2017-03-09 VITALS
TEMPERATURE: 99 F | RESPIRATION RATE: 18 BRPM | DIASTOLIC BLOOD PRESSURE: 65 MMHG | OXYGEN SATURATION: 95 % | HEART RATE: 74 BPM | SYSTOLIC BLOOD PRESSURE: 113 MMHG

## 2017-03-09 DIAGNOSIS — D72.829 ELEVATED WHITE BLOOD CELL COUNT, UNSPECIFIED: ICD-10-CM

## 2017-03-09 LAB
ANION GAP SERPL CALC-SCNC: 10 MMOL/L — SIGNIFICANT CHANGE UP (ref 5–17)
BUN SERPL-MCNC: 19 MG/DL — SIGNIFICANT CHANGE UP (ref 8–20)
CALCIUM SERPL-MCNC: 8.6 MG/DL — SIGNIFICANT CHANGE UP (ref 8.6–10.2)
CHLORIDE SERPL-SCNC: 104 MMOL/L — SIGNIFICANT CHANGE UP (ref 98–107)
CO2 SERPL-SCNC: 26 MMOL/L — SIGNIFICANT CHANGE UP (ref 22–29)
CREAT SERPL-MCNC: 0.62 MG/DL — SIGNIFICANT CHANGE UP (ref 0.5–1.3)
GLUCOSE SERPL-MCNC: 143 MG/DL — HIGH (ref 70–115)
HCT VFR BLD CALC: 33.6 % — LOW (ref 37–47)
HGB BLD-MCNC: 11.2 G/DL — LOW (ref 12–16)
MCHC RBC-ENTMCNC: 29.4 PG — SIGNIFICANT CHANGE UP (ref 27–31)
MCHC RBC-ENTMCNC: 33.3 G/DL — SIGNIFICANT CHANGE UP (ref 32–36)
MCV RBC AUTO: 88.2 FL — SIGNIFICANT CHANGE UP (ref 81–99)
PLATELET # BLD AUTO: 253 K/UL — SIGNIFICANT CHANGE UP (ref 150–400)
POTASSIUM SERPL-MCNC: 4.2 MMOL/L — SIGNIFICANT CHANGE UP (ref 3.5–5.3)
POTASSIUM SERPL-SCNC: 4.2 MMOL/L — SIGNIFICANT CHANGE UP (ref 3.5–5.3)
RBC # BLD: 3.81 M/UL — LOW (ref 4.4–5.2)
RBC # FLD: 13.7 % — SIGNIFICANT CHANGE UP (ref 11–15.6)
SODIUM SERPL-SCNC: 140 MMOL/L — SIGNIFICANT CHANGE UP (ref 135–145)
WBC # BLD: 12.1 K/UL — HIGH (ref 4.8–10.8)
WBC # FLD AUTO: 12.1 K/UL — HIGH (ref 4.8–10.8)

## 2017-03-09 PROCEDURE — 99232 SBSQ HOSP IP/OBS MODERATE 35: CPT

## 2017-03-09 RX ORDER — ASPIRIN/CALCIUM CARB/MAGNESIUM 324 MG
1 TABLET ORAL
Qty: 60 | Refills: 0 | OUTPATIENT
Start: 2017-03-09

## 2017-03-09 RX ORDER — ENOXAPARIN SODIUM 100 MG/ML
30 INJECTION SUBCUTANEOUS
Qty: 26 | Refills: 0 | OUTPATIENT
Start: 2017-03-09

## 2017-03-09 RX ORDER — DOCUSATE SODIUM 100 MG
1 CAPSULE ORAL
Qty: 30 | Refills: 0 | OUTPATIENT
Start: 2017-03-09

## 2017-03-09 RX ORDER — OXYCODONE HYDROCHLORIDE 5 MG/1
1 TABLET ORAL
Qty: 56 | Refills: 0 | OUTPATIENT
Start: 2017-03-09

## 2017-03-09 RX ADMIN — Medication 1 MILLIGRAM(S): at 11:36

## 2017-03-09 RX ADMIN — CELECOXIB 200 MILLIGRAM(S): 200 CAPSULE ORAL at 18:27

## 2017-03-09 RX ADMIN — GABAPENTIN 100 MILLIGRAM(S): 400 CAPSULE ORAL at 17:33

## 2017-03-09 RX ADMIN — OXYCODONE HYDROCHLORIDE 10 MILLIGRAM(S): 5 TABLET ORAL at 13:36

## 2017-03-09 RX ADMIN — CELECOXIB 200 MILLIGRAM(S): 200 CAPSULE ORAL at 17:33

## 2017-03-09 RX ADMIN — Medication 975 MILLIGRAM(S): at 13:28

## 2017-03-09 RX ADMIN — Medication 1 TABLET(S): at 11:36

## 2017-03-09 RX ADMIN — OXYCODONE HYDROCHLORIDE 10 MILLIGRAM(S): 5 TABLET ORAL at 10:38

## 2017-03-09 RX ADMIN — Medication 325 MILLIGRAM(S): at 09:16

## 2017-03-09 RX ADMIN — OXYCODONE HYDROCHLORIDE 10 MILLIGRAM(S): 5 TABLET ORAL at 03:10

## 2017-03-09 RX ADMIN — ENOXAPARIN SODIUM 30 MILLIGRAM(S): 100 INJECTION SUBCUTANEOUS at 05:32

## 2017-03-09 RX ADMIN — OXYCODONE HYDROCHLORIDE 10 MILLIGRAM(S): 5 TABLET ORAL at 14:00

## 2017-03-09 RX ADMIN — OXYCODONE HYDROCHLORIDE 10 MILLIGRAM(S): 5 TABLET ORAL at 05:30

## 2017-03-09 RX ADMIN — Medication 975 MILLIGRAM(S): at 05:30

## 2017-03-09 RX ADMIN — ENOXAPARIN SODIUM 30 MILLIGRAM(S): 100 INJECTION SUBCUTANEOUS at 17:32

## 2017-03-09 RX ADMIN — ESCITALOPRAM OXALATE 20 MILLIGRAM(S): 10 TABLET, FILM COATED ORAL at 13:28

## 2017-03-09 RX ADMIN — Medication 15 MILLIGRAM(S): at 01:20

## 2017-03-09 RX ADMIN — Medication 975 MILLIGRAM(S): at 16:14

## 2017-03-09 RX ADMIN — OXYCODONE HYDROCHLORIDE 10 MILLIGRAM(S): 5 TABLET ORAL at 11:36

## 2017-03-09 RX ADMIN — Medication 975 MILLIGRAM(S): at 06:19

## 2017-03-09 RX ADMIN — Medication 325 MILLIGRAM(S): at 17:33

## 2017-03-09 RX ADMIN — OXYCODONE HYDROCHLORIDE 10 MILLIGRAM(S): 5 TABLET ORAL at 02:32

## 2017-03-09 RX ADMIN — GABAPENTIN 100 MILLIGRAM(S): 400 CAPSULE ORAL at 05:32

## 2017-03-09 RX ADMIN — Medication 15 MILLIGRAM(S): at 01:09

## 2017-03-09 RX ADMIN — OXYCODONE HYDROCHLORIDE 10 MILLIGRAM(S): 5 TABLET ORAL at 17:33

## 2017-03-09 RX ADMIN — Medication 100 MILLIGRAM(S): at 00:57

## 2017-03-09 RX ADMIN — OXYCODONE HYDROCHLORIDE 10 MILLIGRAM(S): 5 TABLET ORAL at 18:27

## 2017-03-09 RX ADMIN — CELECOXIB 200 MILLIGRAM(S): 200 CAPSULE ORAL at 05:31

## 2017-03-09 RX ADMIN — OXYCODONE HYDROCHLORIDE 10 MILLIGRAM(S): 5 TABLET ORAL at 06:20

## 2017-03-09 RX ADMIN — Medication 325 MILLIGRAM(S): at 11:36

## 2017-03-09 RX ADMIN — CELECOXIB 200 MILLIGRAM(S): 200 CAPSULE ORAL at 06:20

## 2017-03-09 RX ADMIN — Medication 100 MILLIGRAM(S): at 05:31

## 2017-03-09 NOTE — PROGRESS NOTE ADULT - SUBJECTIVE AND OBJECTIVE BOX
PMD :Dr Staton     CC : knee pain , controlled     HPI:  55 yo female with h/o fibromyalgia , osteoarthritis right knee h/o ORIF of  R tibia last april secondary to fracture has been experiencing knee pain worsening over the last few months more on walking and limiting her daily activities .She was seen by Dr Heredia had in nov surgery on the right leg hardware removed and advised to get her knee total joint replacement. Seen post op in recovery room s/p spinal anesthesia .She is seen post op day #1 , pain controlled   no overnight events         REVIEW OF SYSTEMS:  toe pain burning , knee pain , body aches anxiety otherwise negative       MEDICATIONS  (STANDING):  ketorolac   Injectable 15milliGRAM(s) IV Push every 6 hours  ceFAZolin   IVPB 2000milliGRAM(s) IV Intermittent <User Schedule>  vancomycin  IVPB 1000milliGRAM(s) IV Intermittent <User Schedule>  docusate sodium 100milliGRAM(s) Oral three times a day  ferrous    sulfate 325milliGRAM(s) Oral three times a day with meals  folic acid 1milliGRAM(s) Oral daily  multivitamin 1Tablet(s) Oral daily  oxyCODONE ER Tablet 10milliGRAM(s) Oral every 12 hours  acetaminophen  IVPB. 1000milliGRAM(s) IV Intermittent <User Schedule>  pregabalin 75milliGRAM(s) Oral two times a day  escitalopram 20milliGRAM(s) Oral daily  simvastatin 40milliGRAM(s) Oral at bedtime  lactated ringers. 1000milliLiter(s) IV Continuous <Continuous>    MEDICATIONS  (PRN):  oxyCODONE IR 5milliGRAM(s) Oral every 3 hours PRN Mild Pain  oxyCODONE IR 10milliGRAM(s) Oral every 3 hours PRN Moderate Pain  HYDROmorphone  Injectable 0.5milliGRAM(s) IV Push every 3 hours PRN Severe Pain  aluminum hydroxide/magnesium hydroxide/simethicone Suspension 30milliLiter(s) Oral four times a day PRN Indigestion  ondansetron Injectable 4milliGRAM(s) IV Push every 6 hours PRN Nausea and/or Vomiting  magnesium hydroxide Suspension 30milliLiter(s) Oral daily PRN Constipation  senna 2Tablet(s) Oral at bedtime PRN Constipation  clonazePAM Tablet 1milliGRAM(s) Oral two times a day PRN anxiety  fentaNYL    Injectable 25MICROGram(s) IV Push every 5 minutes PRN Moderate Pain  ondansetron Injectable 4milliGRAM(s) IV Push once PRN Nausea and/or Vomiting    Vital Signs Last 24 Hrs  T(C): 36.8, Max: 37.3 (03-08 @ 14:20)  T(F): 98.3, Max: 99.1 (03-08 @ 14:20)  HR: 79 (70 - 91)  BP: 111/69 (94/54 - 139/68)  BP(mean): 114 (114 - 114)  RR: 16 (11 - 17)  SpO2: 96% (92% - 99%)  PHYSICAL EXAM:    GENERAL: NAD, well-groomed, well-developed  HEAD:  Atraumatic, Normocephalic  EYES: EOMI, PERRLA, conjunctiva and sclera clear  NECK: Supple, No JVD, Normal thyroid  NERVOUS SYSTEM:  Alert & Oriented X3, Good concentration; no focal deficits moves all extremities   CHEST/LUNG: CTA  b/l,  no rales, rhonchi, wheezing, or rubs  HEART: Regular rate and rhythm; No murmurs, rubs, or gallops  ABDOMEN: Soft, Nontender, Nondistended; Bowel sounds present  EXTREMITIES:  2+ Peripheral Pulses, No clubbing, cyanosis, or edema ,   LYMPH: No lymphadenopathy noted  SKIN: No rashes or lesions    LABS:                        11.2   12.1  )-----------( 253      ( 09 Mar 2017 06:18 )             33.6   09 Mar 2017 06:18    140    |  104    |  19.0   ----------------------------<  143    4.2     |  26.0   |  0.62     Ca    8.6        09 Mar 2017 06:18                      RADIOLOGY & ADDITIONAL STUDIES:

## 2017-03-09 NOTE — PROGRESS NOTE ADULT - SUBJECTIVE AND OBJECTIVE BOX
MARC CASTRORI    [id number]    History: Patient seen and eval at bedside. Patient is doing well and is comfortable. The patient's pain is controlled using the prescribed pain medications. The patient is participating in physical therapy. Denies nausea, vomiting, chest pain, shortness of breath, abdominal pain or fever. No new complaints.    Vital Signs Last 24 Hrs  T(C): 36.8, Max: 37.3 (03-08 @ 14:20)  T(F): 98.2, Max: 99.1 (03-08 @ 14:20)  HR: 81 (64 - 91)  BP: 98/62 (94/54 - 139/68)  BP(mean): 114 (114 - 114)  RR: 16 (11 - 17)  SpO2: 94% (92% - 99%)                        11.2   12.1  )-----------( 253      ( 09 Mar 2017 06:18 )             33.6     09 Mar 2017 06:18    140    |  104    |  19.0   ----------------------------<  143    4.2     |  26.0   |  0.62     Ca    8.6        09 Mar 2017 06:18    PE: NAD, alert, awake  Right LE:   Knee dressing C/D/I no drainage, no bleeding  EHL/TA/FHL/GS intact, DP pulse 2+  Gross sensation to LT intact distally, Calf soft, NT B/L    Primary Orthopedic Assessment:  • s/p RIGHT total knee replacement POD#1    Plan:   • DVT prophylaxis as prescribed - lov, including use of compression devices and ankle pumps  • Continue physical therapy - Weightbearing as tolerated of the right lower extremity with assistance of a walker  • Incentive spirometry encouraged  • Pain control as clinically indicated  • Discharge planning: Home poss today pending PT and med clearance

## 2017-03-09 NOTE — PROGRESS NOTE ADULT - SUBJECTIVE AND OBJECTIVE BOX
57y  Female status post right TKA. VSS pain controlled   No anesthetic complications noted at this time.

## 2017-03-09 NOTE — PROGRESS NOTE ADULT - PROBLEM SELECTOR PLAN 1
psot op care per orthopedics, PT/OT   pain control, perioperative  abx prophylaxis  medically stable

## 2017-03-10 ENCOUNTER — RX RENEWAL (OUTPATIENT)
Age: 57
End: 2017-03-10

## 2017-03-11 ENCOUNTER — EMERGENCY (EMERGENCY)
Facility: HOSPITAL | Age: 57
LOS: 1 days | Discharge: HOME | End: 2017-03-11
Attending: STUDENT IN AN ORGANIZED HEALTH CARE EDUCATION/TRAINING PROGRAM | Admitting: STUDENT IN AN ORGANIZED HEALTH CARE EDUCATION/TRAINING PROGRAM
Payer: MEDICAID

## 2017-03-11 VITALS
HEIGHT: 61 IN | RESPIRATION RATE: 18 BRPM | WEIGHT: 190.04 LBS | OXYGEN SATURATION: 96 % | DIASTOLIC BLOOD PRESSURE: 75 MMHG | HEART RATE: 94 BPM | TEMPERATURE: 99 F | SYSTOLIC BLOOD PRESSURE: 130 MMHG

## 2017-03-11 DIAGNOSIS — Z90.49 ACQUIRED ABSENCE OF OTHER SPECIFIED PARTS OF DIGESTIVE TRACT: Chronic | ICD-10-CM

## 2017-03-11 DIAGNOSIS — E78.4 OTHER HYPERLIPIDEMIA: ICD-10-CM

## 2017-03-11 DIAGNOSIS — B37.9 CANDIDIASIS, UNSPECIFIED: ICD-10-CM

## 2017-03-11 DIAGNOSIS — Z96.651 PRESENCE OF RIGHT ARTIFICIAL KNEE JOINT: ICD-10-CM

## 2017-03-11 DIAGNOSIS — Z98.89 OTHER SPECIFIED POSTPROCEDURAL STATES: Chronic | ICD-10-CM

## 2017-03-11 DIAGNOSIS — K46.9 UNSPECIFIED ABDOMINAL HERNIA WITHOUT OBSTRUCTION OR GANGRENE: Chronic | ICD-10-CM

## 2017-03-11 DIAGNOSIS — Z98.890 OTHER SPECIFIED POSTPROCEDURAL STATES: ICD-10-CM

## 2017-03-11 DIAGNOSIS — S82.209P UNSPECIFIED FRACTURE OF SHAFT OF UNSPECIFIED TIBIA, SUBSEQUENT ENCOUNTER FOR CLOSED FRACTURE WITH MALUNION: Chronic | ICD-10-CM

## 2017-03-11 DIAGNOSIS — Z90.49 ACQUIRED ABSENCE OF OTHER SPECIFIED PARTS OF DIGESTIVE TRACT: ICD-10-CM

## 2017-03-11 DIAGNOSIS — Z79.82 LONG TERM (CURRENT) USE OF ASPIRIN: ICD-10-CM

## 2017-03-11 DIAGNOSIS — R13.10 DYSPHAGIA, UNSPECIFIED: ICD-10-CM

## 2017-03-11 PROCEDURE — 99284 EMERGENCY DEPT VISIT MOD MDM: CPT | Mod: 25

## 2017-03-12 LAB
ALBUMIN SERPL ELPH-MCNC: 3.6 G/DL — SIGNIFICANT CHANGE UP (ref 3.3–5.2)
ALP SERPL-CCNC: 169 U/L — HIGH (ref 40–120)
ALT FLD-CCNC: 60 U/L — HIGH
ANION GAP SERPL CALC-SCNC: 11 MMOL/L — SIGNIFICANT CHANGE UP (ref 5–17)
AST SERPL-CCNC: 26 U/L — SIGNIFICANT CHANGE UP
BILIRUB SERPL-MCNC: 0.8 MG/DL — SIGNIFICANT CHANGE UP (ref 0.4–2)
BUN SERPL-MCNC: 12 MG/DL — SIGNIFICANT CHANGE UP (ref 8–20)
CALCIUM SERPL-MCNC: 9 MG/DL — SIGNIFICANT CHANGE UP (ref 8.6–10.2)
CHLORIDE SERPL-SCNC: 97 MMOL/L — LOW (ref 98–107)
CO2 SERPL-SCNC: 27 MMOL/L — SIGNIFICANT CHANGE UP (ref 22–29)
CREAT SERPL-MCNC: 0.5 MG/DL — SIGNIFICANT CHANGE UP (ref 0.5–1.3)
GLUCOSE SERPL-MCNC: 137 MG/DL — HIGH (ref 70–115)
MAGNESIUM SERPL-MCNC: 2.1 MG/DL — SIGNIFICANT CHANGE UP (ref 1.8–2.5)
POTASSIUM SERPL-MCNC: 3.9 MMOL/L — SIGNIFICANT CHANGE UP (ref 3.5–5.3)
POTASSIUM SERPL-SCNC: 3.9 MMOL/L — SIGNIFICANT CHANGE UP (ref 3.5–5.3)
PROT SERPL-MCNC: 7 G/DL — SIGNIFICANT CHANGE UP (ref 6.6–8.7)
SODIUM SERPL-SCNC: 135 MMOL/L — SIGNIFICANT CHANGE UP (ref 135–145)

## 2017-03-12 PROCEDURE — 83735 ASSAY OF MAGNESIUM: CPT

## 2017-03-12 PROCEDURE — 99284 EMERGENCY DEPT VISIT MOD MDM: CPT | Mod: 25

## 2017-03-12 PROCEDURE — 80053 COMPREHEN METABOLIC PANEL: CPT

## 2017-03-12 RX ORDER — NYSTATIN 500MM UNIT
4 POWDER (EA) MISCELLANEOUS
Qty: 160 | Refills: 0 | OUTPATIENT
Start: 2017-03-12 | End: 2017-03-22

## 2017-03-12 RX ORDER — LIDOCAINE 4 G/100G
7 CREAM TOPICAL ONCE
Qty: 0 | Refills: 0 | Status: COMPLETED | OUTPATIENT
Start: 2017-03-12 | End: 2017-03-12

## 2017-03-12 RX ORDER — NYSTATIN 500MM UNIT
600000 POWDER (EA) MISCELLANEOUS ONCE
Qty: 0 | Refills: 0 | Status: COMPLETED | OUTPATIENT
Start: 2017-03-12 | End: 2017-03-12

## 2017-03-12 RX ORDER — SODIUM CHLORIDE 9 MG/ML
1000 INJECTION INTRAMUSCULAR; INTRAVENOUS; SUBCUTANEOUS ONCE
Qty: 0 | Refills: 0 | Status: COMPLETED | OUTPATIENT
Start: 2017-03-12 | End: 2017-03-12

## 2017-03-12 RX ORDER — LIDOCAINE 4 G/100G
5 CREAM TOPICAL
Qty: 60 | Refills: 0 | OUTPATIENT
Start: 2017-03-12 | End: 2017-03-15

## 2017-03-12 RX ADMIN — Medication 600000 UNIT(S): at 01:03

## 2017-03-12 RX ADMIN — LIDOCAINE 7 MILLILITER(S): 4 CREAM TOPICAL at 01:42

## 2017-03-12 RX ADMIN — Medication 30 MILLILITER(S): at 01:42

## 2017-03-12 RX ADMIN — SODIUM CHLORIDE 1000 MILLILITER(S): 9 INJECTION INTRAMUSCULAR; INTRAVENOUS; SUBCUTANEOUS at 01:05

## 2017-03-12 NOTE — ED PROVIDER NOTE - ATTENDING CONTRIBUTION TO CARE
58 yo female with pharyngitis. I personally saw the patient with the PA, and completed the key components of the history and physical exam. I then discussed the management plan with the PA.

## 2017-03-12 NOTE — ED PROVIDER NOTE - MOUTH
white patches to buccal mucosa and posterior pharynx, airway clear, able to swallow her own secretions, no hoarse voice, mouth is dry

## 2017-03-12 NOTE — ED PROVIDER NOTE - PROGRESS NOTE DETAILS
Pt feeling better. Tolerating po. VSS. Will rx lidocaine, maalox and nystatin. F/u with PMD. Advised pt to be tested for DM as sugar is increased. Given copies of results.

## 2017-03-12 NOTE — ED ADULT NURSE NOTE - OBJECTIVE STATEMENT
Pt s/p knee surgery on Wednesday yesterday starting having difficulty swallowing increased thirst and pain in the throat. White patches noted to mouth.

## 2017-03-12 NOTE — ED PROVIDER NOTE - OBJECTIVE STATEMENT
This is a 58 y/o female presenting to the ED with throat/mouth pain x 2 days. Pt states she just had right knee replacement a few days ago. Pt believes she was intubated for this procedure. She admits to a decrease in drinking secondary to the pain. Pt describes the pain as burning. She denies nausea, vomiting, stridor, drooling, fever, chills, sob, hoarse voice, cp, hp, hx of diabetes.

## 2017-03-13 LAB — SURGICAL PATHOLOGY FINAL REPORT - CH: SIGNIFICANT CHANGE UP

## 2017-03-23 ENCOUNTER — APPOINTMENT (OUTPATIENT)
Dept: ORTHOPEDIC SURGERY | Facility: CLINIC | Age: 57
End: 2017-03-23

## 2017-03-23 VITALS
BODY MASS INDEX: 39.65 KG/M2 | SYSTOLIC BLOOD PRESSURE: 112 MMHG | HEIGHT: 61 IN | WEIGHT: 210 LBS | HEART RATE: 67 BPM | DIASTOLIC BLOOD PRESSURE: 68 MMHG

## 2017-04-03 ENCOUNTER — OUTPATIENT (OUTPATIENT)
Dept: OUTPATIENT SERVICES | Facility: HOSPITAL | Age: 57
LOS: 1 days | End: 2017-04-03
Payer: MEDICAID

## 2017-04-03 DIAGNOSIS — S82.209P UNSPECIFIED FRACTURE OF SHAFT OF UNSPECIFIED TIBIA, SUBSEQUENT ENCOUNTER FOR CLOSED FRACTURE WITH MALUNION: Chronic | ICD-10-CM

## 2017-04-03 DIAGNOSIS — M17.11 UNILATERAL PRIMARY OSTEOARTHRITIS, RIGHT KNEE: ICD-10-CM

## 2017-04-03 DIAGNOSIS — Z98.89 OTHER SPECIFIED POSTPROCEDURAL STATES: Chronic | ICD-10-CM

## 2017-04-03 DIAGNOSIS — Z51.89 ENCOUNTER FOR OTHER SPECIFIED AFTERCARE: ICD-10-CM

## 2017-04-03 DIAGNOSIS — Z90.49 ACQUIRED ABSENCE OF OTHER SPECIFIED PARTS OF DIGESTIVE TRACT: Chronic | ICD-10-CM

## 2017-04-03 DIAGNOSIS — K46.9 UNSPECIFIED ABDOMINAL HERNIA WITHOUT OBSTRUCTION OR GANGRENE: Chronic | ICD-10-CM

## 2017-04-04 ENCOUNTER — APPOINTMENT (OUTPATIENT)
Dept: ORTHOPEDIC SURGERY | Facility: CLINIC | Age: 57
End: 2017-04-04

## 2017-04-04 ENCOUNTER — OTHER (OUTPATIENT)
Age: 57
End: 2017-04-04

## 2017-04-04 VITALS
DIASTOLIC BLOOD PRESSURE: 78 MMHG | BODY MASS INDEX: 39.65 KG/M2 | WEIGHT: 210 LBS | SYSTOLIC BLOOD PRESSURE: 127 MMHG | TEMPERATURE: 98.1 F | HEART RATE: 80 BPM | HEIGHT: 61 IN

## 2017-04-04 RX ORDER — TIZANIDINE 2 MG/1
2 TABLET ORAL
Qty: 60 | Refills: 0 | Status: DISCONTINUED | COMMUNITY
Start: 2017-02-24 | End: 2017-04-04

## 2017-04-04 RX ORDER — TRAMADOL HYDROCHLORIDE 50 MG/1
50 TABLET, COATED ORAL
Qty: 40 | Refills: 0 | Status: DISCONTINUED | COMMUNITY
Start: 2017-02-24 | End: 2017-04-04

## 2017-04-04 RX ORDER — OXYCODONE HYDROCHLORIDE 10 MG/1
10 TABLET, FILM COATED, EXTENDED RELEASE ORAL
Qty: 10 | Refills: 0 | Status: DISCONTINUED | COMMUNITY
Start: 2017-03-10 | End: 2017-04-04

## 2017-04-04 RX ORDER — ACETAMINOPHEN 500 MG/1
500 TABLET, COATED ORAL EVERY 8 HOURS
Qty: 90 | Refills: 0 | Status: DISCONTINUED | COMMUNITY
Start: 2017-03-10 | End: 2017-04-04

## 2017-04-13 ENCOUNTER — APPOINTMENT (OUTPATIENT)
Dept: ORTHOPEDIC SURGERY | Facility: CLINIC | Age: 57
End: 2017-04-13

## 2017-04-13 VITALS
BODY MASS INDEX: 39.65 KG/M2 | WEIGHT: 210 LBS | DIASTOLIC BLOOD PRESSURE: 76 MMHG | HEART RATE: 81 BPM | HEIGHT: 61 IN | SYSTOLIC BLOOD PRESSURE: 118 MMHG

## 2017-04-13 PROCEDURE — 77063 BREAST TOMOSYNTHESIS BI: CPT

## 2017-04-13 PROCEDURE — 76642 ULTRASOUND BREAST LIMITED: CPT

## 2017-04-13 PROCEDURE — 77067 SCR MAMMO BI INCL CAD: CPT

## 2017-04-19 ENCOUNTER — APPOINTMENT (OUTPATIENT)
Dept: ORTHOPEDIC SURGERY | Facility: CLINIC | Age: 57
End: 2017-04-19

## 2017-04-19 PROCEDURE — 97163 PT EVAL HIGH COMPLEX 45 MIN: CPT

## 2017-04-19 PROCEDURE — 97110 THERAPEUTIC EXERCISES: CPT

## 2017-04-19 PROCEDURE — 97010 HOT OR COLD PACKS THERAPY: CPT

## 2017-05-09 ENCOUNTER — OTHER (OUTPATIENT)
Age: 57
End: 2017-05-09

## 2017-05-18 ENCOUNTER — APPOINTMENT (OUTPATIENT)
Dept: ORTHOPEDIC SURGERY | Facility: CLINIC | Age: 57
End: 2017-05-18

## 2017-05-18 VITALS
SYSTOLIC BLOOD PRESSURE: 115 MMHG | BODY MASS INDEX: 39.65 KG/M2 | HEIGHT: 61 IN | WEIGHT: 210 LBS | HEART RATE: 72 BPM | DIASTOLIC BLOOD PRESSURE: 78 MMHG

## 2017-06-09 ENCOUNTER — RESULT REVIEW (OUTPATIENT)
Age: 57
End: 2017-06-09

## 2017-06-16 ENCOUNTER — OTHER (OUTPATIENT)
Age: 57
End: 2017-06-16

## 2017-06-19 ENCOUNTER — OTHER (OUTPATIENT)
Age: 57
End: 2017-06-19

## 2017-06-26 ENCOUNTER — APPOINTMENT (OUTPATIENT)
Dept: ORTHOPEDIC SURGERY | Facility: CLINIC | Age: 57
End: 2017-06-26

## 2017-06-26 VITALS
TEMPERATURE: 97.9 F | DIASTOLIC BLOOD PRESSURE: 83 MMHG | HEART RATE: 71 BPM | WEIGHT: 210 LBS | BODY MASS INDEX: 39.65 KG/M2 | HEIGHT: 61 IN | SYSTOLIC BLOOD PRESSURE: 144 MMHG

## 2017-08-17 ENCOUNTER — OTHER (OUTPATIENT)
Age: 57
End: 2017-08-17

## 2017-08-22 ENCOUNTER — OTHER (OUTPATIENT)
Age: 57
End: 2017-08-22

## 2017-08-31 ENCOUNTER — APPOINTMENT (OUTPATIENT)
Dept: ORTHOPEDIC SURGERY | Facility: CLINIC | Age: 57
End: 2017-08-31
Payer: MEDICAID

## 2017-08-31 VITALS
WEIGHT: 200 LBS | DIASTOLIC BLOOD PRESSURE: 83 MMHG | SYSTOLIC BLOOD PRESSURE: 124 MMHG | HEART RATE: 74 BPM | BODY MASS INDEX: 37.76 KG/M2 | HEIGHT: 61 IN

## 2017-08-31 PROCEDURE — 99215 OFFICE O/P EST HI 40 MIN: CPT

## 2017-08-31 PROCEDURE — 73562 X-RAY EXAM OF KNEE 3: CPT | Mod: 50

## 2017-10-06 ENCOUNTER — OUTPATIENT (OUTPATIENT)
Dept: OUTPATIENT SERVICES | Facility: HOSPITAL | Age: 57
LOS: 1 days | End: 2017-10-06
Payer: MEDICAID

## 2017-10-06 VITALS
WEIGHT: 216.05 LBS | SYSTOLIC BLOOD PRESSURE: 108 MMHG | HEIGHT: 61 IN | HEART RATE: 77 BPM | DIASTOLIC BLOOD PRESSURE: 69 MMHG | RESPIRATION RATE: 16 BRPM | TEMPERATURE: 98 F

## 2017-10-06 DIAGNOSIS — S82.209P UNSPECIFIED FRACTURE OF SHAFT OF UNSPECIFIED TIBIA, SUBSEQUENT ENCOUNTER FOR CLOSED FRACTURE WITH MALUNION: Chronic | ICD-10-CM

## 2017-10-06 DIAGNOSIS — E78.5 HYPERLIPIDEMIA, UNSPECIFIED: ICD-10-CM

## 2017-10-06 DIAGNOSIS — M17.12 UNILATERAL PRIMARY OSTEOARTHRITIS, LEFT KNEE: ICD-10-CM

## 2017-10-06 DIAGNOSIS — Z98.89 OTHER SPECIFIED POSTPROCEDURAL STATES: Chronic | ICD-10-CM

## 2017-10-06 DIAGNOSIS — Z90.49 ACQUIRED ABSENCE OF OTHER SPECIFIED PARTS OF DIGESTIVE TRACT: Chronic | ICD-10-CM

## 2017-10-06 DIAGNOSIS — Z01.818 ENCOUNTER FOR OTHER PREPROCEDURAL EXAMINATION: ICD-10-CM

## 2017-10-06 DIAGNOSIS — Z96.651 PRESENCE OF RIGHT ARTIFICIAL KNEE JOINT: Chronic | ICD-10-CM

## 2017-10-06 DIAGNOSIS — G62.9 POLYNEUROPATHY, UNSPECIFIED: ICD-10-CM

## 2017-10-06 DIAGNOSIS — K46.9 UNSPECIFIED ABDOMINAL HERNIA WITHOUT OBSTRUCTION OR GANGRENE: Chronic | ICD-10-CM

## 2017-10-06 LAB
ANION GAP SERPL CALC-SCNC: 13 MMOL/L — SIGNIFICANT CHANGE UP (ref 5–17)
APTT BLD: 28.1 SEC — SIGNIFICANT CHANGE UP (ref 27.5–37.4)
BASOPHILS # BLD AUTO: 0 K/UL — SIGNIFICANT CHANGE UP (ref 0–0.2)
BASOPHILS NFR BLD AUTO: 0.2 % — SIGNIFICANT CHANGE UP (ref 0–2)
BLD GP AB SCN SERPL QL: SIGNIFICANT CHANGE UP
BUN SERPL-MCNC: 21 MG/DL — HIGH (ref 8–20)
CALCIUM SERPL-MCNC: 8.8 MG/DL — SIGNIFICANT CHANGE UP (ref 8.6–10.2)
CHLORIDE SERPL-SCNC: 101 MMOL/L — SIGNIFICANT CHANGE UP (ref 98–107)
CO2 SERPL-SCNC: 23 MMOL/L — SIGNIFICANT CHANGE UP (ref 22–29)
CREAT SERPL-MCNC: 0.48 MG/DL — LOW (ref 0.5–1.3)
EOSINOPHIL # BLD AUTO: 0.1 K/UL — SIGNIFICANT CHANGE UP (ref 0–0.5)
EOSINOPHIL NFR BLD AUTO: 1 % — SIGNIFICANT CHANGE UP (ref 0–6)
GLUCOSE SERPL-MCNC: 112 MG/DL — SIGNIFICANT CHANGE UP (ref 70–115)
HCT VFR BLD CALC: 42.3 % — SIGNIFICANT CHANGE UP (ref 37–47)
HGB BLD-MCNC: 14.1 G/DL — SIGNIFICANT CHANGE UP (ref 12–16)
INR BLD: 0.88 RATIO — SIGNIFICANT CHANGE UP (ref 0.88–1.16)
LYMPHOCYTES # BLD AUTO: 17.5 % — LOW (ref 20–55)
LYMPHOCYTES # BLD AUTO: 2.1 K/UL — SIGNIFICANT CHANGE UP (ref 1–4.8)
MCHC RBC-ENTMCNC: 29.1 PG — SIGNIFICANT CHANGE UP (ref 27–31)
MCHC RBC-ENTMCNC: 33.3 G/DL — SIGNIFICANT CHANGE UP (ref 32–36)
MCV RBC AUTO: 87.4 FL — SIGNIFICANT CHANGE UP (ref 81–99)
MONOCYTES # BLD AUTO: 0.8 K/UL — SIGNIFICANT CHANGE UP (ref 0–0.8)
MONOCYTES NFR BLD AUTO: 6.4 % — SIGNIFICANT CHANGE UP (ref 3–10)
MRSA PCR RESULT.: SIGNIFICANT CHANGE UP
NEUTROPHILS # BLD AUTO: 9 K/UL — HIGH (ref 1.8–8)
NEUTROPHILS NFR BLD AUTO: 74.6 % — HIGH (ref 37–73)
PLATELET # BLD AUTO: 315 K/UL — SIGNIFICANT CHANGE UP (ref 150–400)
POTASSIUM SERPL-MCNC: 4.3 MMOL/L — SIGNIFICANT CHANGE UP (ref 3.5–5.3)
POTASSIUM SERPL-SCNC: 4.3 MMOL/L — SIGNIFICANT CHANGE UP (ref 3.5–5.3)
PROTHROM AB SERPL-ACNC: 9.6 SEC — LOW (ref 9.8–12.7)
RBC # BLD: 4.84 M/UL — SIGNIFICANT CHANGE UP (ref 4.4–5.2)
RBC # FLD: 13.7 % — SIGNIFICANT CHANGE UP (ref 11–15.6)
S AUREUS DNA NOSE QL NAA+PROBE: SIGNIFICANT CHANGE UP
SODIUM SERPL-SCNC: 137 MMOL/L — SIGNIFICANT CHANGE UP (ref 135–145)
TYPE + AB SCN PNL BLD: SIGNIFICANT CHANGE UP
WBC # BLD: 12 K/UL — HIGH (ref 4.8–10.8)
WBC # FLD AUTO: 12 K/UL — HIGH (ref 4.8–10.8)

## 2017-10-06 PROCEDURE — G0463: CPT

## 2017-10-06 PROCEDURE — 86901 BLOOD TYPING SEROLOGIC RH(D): CPT

## 2017-10-06 PROCEDURE — 86850 RBC ANTIBODY SCREEN: CPT

## 2017-10-06 PROCEDURE — 93010 ELECTROCARDIOGRAM REPORT: CPT

## 2017-10-06 PROCEDURE — 87640 STAPH A DNA AMP PROBE: CPT

## 2017-10-06 PROCEDURE — 86900 BLOOD TYPING SEROLOGIC ABO: CPT

## 2017-10-06 PROCEDURE — 85610 PROTHROMBIN TIME: CPT

## 2017-10-06 PROCEDURE — 85730 THROMBOPLASTIN TIME PARTIAL: CPT

## 2017-10-06 PROCEDURE — 80048 BASIC METABOLIC PNL TOTAL CA: CPT

## 2017-10-06 PROCEDURE — 85027 COMPLETE CBC AUTOMATED: CPT

## 2017-10-06 PROCEDURE — 93005 ELECTROCARDIOGRAM TRACING: CPT

## 2017-10-06 PROCEDURE — 36415 COLL VENOUS BLD VENIPUNCTURE: CPT

## 2017-10-06 RX ORDER — CLONAZEPAM 1 MG
1 TABLET ORAL
Qty: 0 | Refills: 0 | COMMUNITY

## 2017-10-06 RX ORDER — CHOLECALCIFEROL (VITAMIN D3) 125 MCG
1 CAPSULE ORAL
Qty: 0 | Refills: 0 | COMMUNITY

## 2017-10-06 RX ORDER — SODIUM CHLORIDE 9 MG/ML
3 INJECTION INTRAMUSCULAR; INTRAVENOUS; SUBCUTANEOUS EVERY 8 HOURS
Qty: 0 | Refills: 0 | Status: DISCONTINUED | OUTPATIENT
Start: 2017-10-27 | End: 2017-10-29

## 2017-10-06 RX ORDER — ESCITALOPRAM OXALATE 10 MG/1
1 TABLET, FILM COATED ORAL
Qty: 0 | Refills: 0 | COMMUNITY

## 2017-10-06 NOTE — H&P PST ADULT - FAMILY HISTORY
Family history of peritoneal cancer     Family history of prostate cancer     Child  Still living? Yes, Estimated age: 21-30  Family history of Hodgkin's lymphoma, Age at diagnosis: 11-20

## 2017-10-06 NOTE — PATIENT PROFILE ADULT. - PRO PAIN LIFE ADAPT
inability to enjoy life/decreased activity level/inability to work/withdrawal from social contact/inability or reluctance to perform ADLs/inability to concentrate/inability to sleep

## 2017-10-06 NOTE — H&P PST ADULT - PSH
Abdominal hernia    Closed fracture of tibia with malunion  s/p surgical repair; hardware removed 11/2016  H/O excision of mass  cysts right hand, R shoulder, L leg, R leg  H/O total knee replacement, right  2016  History of cholecystectomy    History of colon surgery    S/P arthroscopic knee surgery  bilat  S/P carpal tunnel release  bilat  Status post left breast lumpectomy

## 2017-10-06 NOTE — H&P PST ADULT - HISTORY OF PRESENT ILLNESS
57 year old female with left knee pain for the last few years, she had right knee replacement last year

## 2017-10-06 NOTE — PATIENT PROFILE ADULT. - VISION (WITH CORRECTIVE LENSES IF THE PATIENT USUALLY WEARS THEM):
glasses - distance/Normal vision: sees adequately in most situations; can see medication labels, newsprint

## 2017-10-18 RX ORDER — VANCOMYCIN HCL 1 G
1500 VIAL (EA) INTRAVENOUS ONCE
Qty: 0 | Refills: 0 | Status: COMPLETED | OUTPATIENT
Start: 2017-10-27 | End: 2017-10-27

## 2017-10-18 RX ORDER — CEFAZOLIN SODIUM 1 G
2000 VIAL (EA) INJECTION ONCE
Qty: 0 | Refills: 0 | Status: DISCONTINUED | OUTPATIENT
Start: 2017-10-27 | End: 2017-10-27

## 2017-10-18 RX ORDER — TRANEXAMIC ACID 100 MG/ML
1000 INJECTION, SOLUTION INTRAVENOUS ONCE
Qty: 0 | Refills: 0 | Status: DISCONTINUED | OUTPATIENT
Start: 2017-10-27 | End: 2017-10-27

## 2017-10-18 RX ORDER — SCOPALAMINE 1 MG/3D
1.5 PATCH, EXTENDED RELEASE TRANSDERMAL ONCE
Qty: 0 | Refills: 0 | Status: COMPLETED | OUTPATIENT
Start: 2017-10-27 | End: 2017-10-27

## 2017-10-18 RX ORDER — ACETAMINOPHEN 500 MG
1000 TABLET ORAL ONCE
Qty: 0 | Refills: 0 | Status: DISCONTINUED | OUTPATIENT
Start: 2017-10-27 | End: 2017-10-27

## 2017-10-18 RX ORDER — CELECOXIB 200 MG/1
400 CAPSULE ORAL ONCE
Qty: 0 | Refills: 0 | Status: DISCONTINUED | OUTPATIENT
Start: 2017-10-27 | End: 2017-10-27

## 2017-10-18 RX ORDER — GABAPENTIN 400 MG/1
300 CAPSULE ORAL ONCE
Qty: 0 | Refills: 0 | Status: COMPLETED | OUTPATIENT
Start: 2017-10-27 | End: 2017-10-27

## 2017-10-18 RX ORDER — OXYCODONE HYDROCHLORIDE 5 MG/1
20 TABLET ORAL ONCE
Qty: 0 | Refills: 0 | Status: DISCONTINUED | OUTPATIENT
Start: 2017-10-27 | End: 2017-10-27

## 2017-10-27 ENCOUNTER — TRANSCRIPTION ENCOUNTER (OUTPATIENT)
Age: 57
End: 2017-10-27

## 2017-10-27 ENCOUNTER — APPOINTMENT (OUTPATIENT)
Dept: ORTHOPEDIC SURGERY | Facility: HOSPITAL | Age: 57
End: 2017-10-27

## 2017-10-27 ENCOUNTER — INPATIENT (INPATIENT)
Facility: HOSPITAL | Age: 57
LOS: 1 days | Discharge: HOME | DRG: 470 | End: 2017-10-29
Attending: ORTHOPAEDIC SURGERY | Admitting: ORTHOPAEDIC SURGERY
Payer: MEDICARE

## 2017-10-27 ENCOUNTER — RESULT REVIEW (OUTPATIENT)
Age: 57
End: 2017-10-27

## 2017-10-27 VITALS
TEMPERATURE: 97 F | SYSTOLIC BLOOD PRESSURE: 122 MMHG | HEIGHT: 61 IN | WEIGHT: 216.05 LBS | OXYGEN SATURATION: 99 % | HEART RATE: 71 BPM | RESPIRATION RATE: 18 BRPM | DIASTOLIC BLOOD PRESSURE: 74 MMHG

## 2017-10-27 DIAGNOSIS — G62.9 POLYNEUROPATHY, UNSPECIFIED: ICD-10-CM

## 2017-10-27 DIAGNOSIS — Z00.00 ENCOUNTER FOR GENERAL ADULT MEDICAL EXAMINATION WITHOUT ABNORMAL FINDINGS: ICD-10-CM

## 2017-10-27 DIAGNOSIS — K46.9 UNSPECIFIED ABDOMINAL HERNIA WITHOUT OBSTRUCTION OR GANGRENE: Chronic | ICD-10-CM

## 2017-10-27 DIAGNOSIS — Z98.89 OTHER SPECIFIED POSTPROCEDURAL STATES: Chronic | ICD-10-CM

## 2017-10-27 DIAGNOSIS — S82.209P UNSPECIFIED FRACTURE OF SHAFT OF UNSPECIFIED TIBIA, SUBSEQUENT ENCOUNTER FOR CLOSED FRACTURE WITH MALUNION: Chronic | ICD-10-CM

## 2017-10-27 DIAGNOSIS — Z96.651 PRESENCE OF RIGHT ARTIFICIAL KNEE JOINT: Chronic | ICD-10-CM

## 2017-10-27 DIAGNOSIS — M17.12 UNILATERAL PRIMARY OSTEOARTHRITIS, LEFT KNEE: ICD-10-CM

## 2017-10-27 DIAGNOSIS — M79.7 FIBROMYALGIA: ICD-10-CM

## 2017-10-27 DIAGNOSIS — Z90.49 ACQUIRED ABSENCE OF OTHER SPECIFIED PARTS OF DIGESTIVE TRACT: Chronic | ICD-10-CM

## 2017-10-27 DIAGNOSIS — E78.5 HYPERLIPIDEMIA, UNSPECIFIED: ICD-10-CM

## 2017-10-27 DIAGNOSIS — Z29.9 ENCOUNTER FOR PROPHYLACTIC MEASURES, UNSPECIFIED: ICD-10-CM

## 2017-10-27 PROCEDURE — 88305 TISSUE EXAM BY PATHOLOGIST: CPT | Mod: 26

## 2017-10-27 PROCEDURE — 27447 TOTAL KNEE ARTHROPLASTY: CPT | Mod: LT

## 2017-10-27 PROCEDURE — 88311 DECALCIFY TISSUE: CPT | Mod: 26

## 2017-10-27 PROCEDURE — 99222 1ST HOSP IP/OBS MODERATE 55: CPT

## 2017-10-27 PROCEDURE — 20985 CPTR-ASST DIR MS PX: CPT | Mod: AS

## 2017-10-27 PROCEDURE — 27447 TOTAL KNEE ARTHROPLASTY: CPT | Mod: AS,LT

## 2017-10-27 PROCEDURE — 20985 CPTR-ASST DIR MS PX: CPT

## 2017-10-27 PROCEDURE — 73560 X-RAY EXAM OF KNEE 1 OR 2: CPT | Mod: 26,LT

## 2017-10-27 RX ORDER — KETOROLAC TROMETHAMINE 30 MG/ML
15 SYRINGE (ML) INJECTION EVERY 6 HOURS
Qty: 0 | Refills: 0 | Status: DISCONTINUED | OUTPATIENT
Start: 2017-10-27 | End: 2017-10-28

## 2017-10-27 RX ORDER — MAGNESIUM HYDROXIDE 400 MG/1
30 TABLET, CHEWABLE ORAL DAILY
Qty: 0 | Refills: 0 | Status: DISCONTINUED | OUTPATIENT
Start: 2017-10-27 | End: 2017-10-29

## 2017-10-27 RX ORDER — POLYETHYLENE GLYCOL 3350 17 G/17G
17 POWDER, FOR SOLUTION ORAL DAILY
Qty: 0 | Refills: 0 | Status: DISCONTINUED | OUTPATIENT
Start: 2017-10-27 | End: 2017-10-29

## 2017-10-27 RX ORDER — SODIUM CHLORIDE 9 MG/ML
1000 INJECTION, SOLUTION INTRAVENOUS
Qty: 0 | Refills: 0 | Status: DISCONTINUED | OUTPATIENT
Start: 2017-10-27 | End: 2017-10-27

## 2017-10-27 RX ORDER — ONDANSETRON 8 MG/1
4 TABLET, FILM COATED ORAL EVERY 6 HOURS
Qty: 0 | Refills: 0 | Status: DISCONTINUED | OUTPATIENT
Start: 2017-10-27 | End: 2017-10-29

## 2017-10-27 RX ORDER — SODIUM CHLORIDE 9 MG/ML
1000 INJECTION, SOLUTION INTRAVENOUS
Qty: 0 | Refills: 0 | Status: DISCONTINUED | OUTPATIENT
Start: 2017-10-27 | End: 2017-10-29

## 2017-10-27 RX ORDER — SODIUM CHLORIDE 9 MG/ML
500 INJECTION, SOLUTION INTRAVENOUS
Qty: 0 | Refills: 0 | Status: DISCONTINUED | OUTPATIENT
Start: 2017-10-27 | End: 2017-10-27

## 2017-10-27 RX ORDER — SENNA PLUS 8.6 MG/1
2 TABLET ORAL AT BEDTIME
Qty: 0 | Refills: 0 | Status: DISCONTINUED | OUTPATIENT
Start: 2017-10-27 | End: 2017-10-29

## 2017-10-27 RX ORDER — VANCOMYCIN HCL 1 G
1500 VIAL (EA) INTRAVENOUS ONCE
Qty: 0 | Refills: 0 | Status: COMPLETED | OUTPATIENT
Start: 2017-10-27 | End: 2017-10-27

## 2017-10-27 RX ORDER — FENTANYL CITRATE 50 UG/ML
25 INJECTION INTRAVENOUS
Qty: 0 | Refills: 0 | Status: DISCONTINUED | OUTPATIENT
Start: 2017-10-27 | End: 2017-10-27

## 2017-10-27 RX ORDER — CEFAZOLIN SODIUM 1 G
2000 VIAL (EA) INJECTION
Qty: 0 | Refills: 0 | Status: COMPLETED | OUTPATIENT
Start: 2017-10-27 | End: 2017-10-27

## 2017-10-27 RX ORDER — DOCUSATE SODIUM 100 MG
100 CAPSULE ORAL THREE TIMES A DAY
Qty: 0 | Refills: 0 | Status: DISCONTINUED | OUTPATIENT
Start: 2017-10-27 | End: 2017-10-29

## 2017-10-27 RX ORDER — ASPIRIN/CALCIUM CARB/MAGNESIUM 324 MG
325 TABLET ORAL
Qty: 0 | Refills: 0 | Status: DISCONTINUED | OUTPATIENT
Start: 2017-10-28 | End: 2017-10-29

## 2017-10-27 RX ORDER — SIMVASTATIN 20 MG/1
40 TABLET, FILM COATED ORAL AT BEDTIME
Qty: 0 | Refills: 0 | Status: DISCONTINUED | OUTPATIENT
Start: 2017-10-27 | End: 2017-10-29

## 2017-10-27 RX ORDER — HYDROMORPHONE HYDROCHLORIDE 2 MG/ML
2 INJECTION INTRAMUSCULAR; INTRAVENOUS; SUBCUTANEOUS EVERY 4 HOURS
Qty: 0 | Refills: 0 | Status: DISCONTINUED | OUTPATIENT
Start: 2017-10-27 | End: 2017-10-29

## 2017-10-27 RX ORDER — OXYCODONE HYDROCHLORIDE 5 MG/1
10 TABLET ORAL
Qty: 0 | Refills: 0 | Status: DISCONTINUED | OUTPATIENT
Start: 2017-10-27 | End: 2017-10-29

## 2017-10-27 RX ORDER — ACETAMINOPHEN 500 MG
975 TABLET ORAL EVERY 8 HOURS
Qty: 0 | Refills: 0 | Status: DISCONTINUED | OUTPATIENT
Start: 2017-10-27 | End: 2017-10-29

## 2017-10-27 RX ORDER — OXYCODONE HYDROCHLORIDE 5 MG/1
5 TABLET ORAL
Qty: 0 | Refills: 0 | Status: DISCONTINUED | OUTPATIENT
Start: 2017-10-27 | End: 2017-10-29

## 2017-10-27 RX ORDER — VANCOMYCIN HCL 1 G
1500 VIAL (EA) INTRAVENOUS
Qty: 0 | Refills: 0 | Status: DISCONTINUED | OUTPATIENT
Start: 2017-10-27 | End: 2017-10-27

## 2017-10-27 RX ORDER — HYDROMORPHONE HYDROCHLORIDE 2 MG/ML
0.5 INJECTION INTRAMUSCULAR; INTRAVENOUS; SUBCUTANEOUS EVERY 4 HOURS
Qty: 0 | Refills: 0 | Status: DISCONTINUED | OUTPATIENT
Start: 2017-10-27 | End: 2017-10-29

## 2017-10-27 RX ORDER — ONDANSETRON 8 MG/1
4 TABLET, FILM COATED ORAL ONCE
Qty: 0 | Refills: 0 | Status: DISCONTINUED | OUTPATIENT
Start: 2017-10-27 | End: 2017-10-27

## 2017-10-27 RX ORDER — SIMVASTATIN 20 MG/1
1 TABLET, FILM COATED ORAL
Qty: 0 | Refills: 0 | COMMUNITY

## 2017-10-27 RX ADMIN — OXYCODONE HYDROCHLORIDE 10 MILLIGRAM(S): 5 TABLET ORAL at 15:17

## 2017-10-27 RX ADMIN — Medication 100 MILLIGRAM(S): at 09:25

## 2017-10-27 RX ADMIN — Medication 100 MILLIGRAM(S): at 14:54

## 2017-10-27 RX ADMIN — Medication 100 MILLIGRAM(S): at 21:12

## 2017-10-27 RX ADMIN — Medication 975 MILLIGRAM(S): at 21:12

## 2017-10-27 RX ADMIN — Medication 300 MILLIGRAM(S): at 07:36

## 2017-10-27 RX ADMIN — Medication 100 MILLIGRAM(S): at 21:15

## 2017-10-27 RX ADMIN — SIMVASTATIN 40 MILLIGRAM(S): 20 TABLET, FILM COATED ORAL at 23:39

## 2017-10-27 RX ADMIN — Medication 975 MILLIGRAM(S): at 22:00

## 2017-10-27 RX ADMIN — SODIUM CHLORIDE 3 MILLILITER(S): 9 INJECTION INTRAMUSCULAR; INTRAVENOUS; SUBCUTANEOUS at 21:18

## 2017-10-27 RX ADMIN — GABAPENTIN 300 MILLIGRAM(S): 400 CAPSULE ORAL at 08:22

## 2017-10-27 RX ADMIN — OXYCODONE HYDROCHLORIDE 20 MILLIGRAM(S): 5 TABLET ORAL at 08:22

## 2017-10-27 RX ADMIN — HYDROMORPHONE HYDROCHLORIDE 2 MILLIGRAM(S): 2 INJECTION INTRAMUSCULAR; INTRAVENOUS; SUBCUTANEOUS at 22:00

## 2017-10-27 RX ADMIN — SODIUM CHLORIDE 500 MILLILITER(S): 9 INJECTION, SOLUTION INTRAVENOUS at 08:27

## 2017-10-27 RX ADMIN — HYDROMORPHONE HYDROCHLORIDE 2 MILLIGRAM(S): 2 INJECTION INTRAMUSCULAR; INTRAVENOUS; SUBCUTANEOUS at 21:12

## 2017-10-27 RX ADMIN — Medication 300 MILLIGRAM(S): at 23:39

## 2017-10-27 RX ADMIN — SCOPALAMINE 1.5 MILLIGRAM(S): 1 PATCH, EXTENDED RELEASE TRANSDERMAL at 08:32

## 2017-10-27 NOTE — DISCHARGE NOTE ADULT - PLAN OF CARE
Improved function and pain control The patient will be seen in the office in 2 weeks for wound check. Sutures/Staples/Tape will be removed at that time. Patient may shower after post-op day #3 (10/30/2017). The dressing is to be removed on post op day #9 (11/5/2017). IF THE DRESSING BECOMES SOILED BEFORE THE REMOVAL DATE, CHANGE WITH A SIMILAR DRESSING. IF THE DRESSING BECOMES STAINED WITH DISCHARGE, CONTACT THE OFFICE FOR FURTHER DIRECTIONS. The patient will contact the office if the wound becomes red, has increasing pain, develops bleeding or discharge, an injury occurs, or has other concerns. The patient will continue PT consistent with total knee replacement. The patient will continue aspirin 325mg twice daily for 6 weeks for blood clot prevention. The patient will take OXYCODONE AND TYLENOL for pain control and titrate according to prescription and patient needs. The patient will take Senna-S while taking oxycodone to prevent narcotic associated constipation.  Additionally, increase water intake (drink at least 8 glasses of water daily) and try adding fiber to the diet by eating fruits, vegetables and foods that are rich in grains. If constipation is experienced, contact the medical/primary care provider to discuss further treatment options. The patient is FULL weight bearing. Elevation of the lower leg is recommended to reduce swelling. The patient will be seen in the office in 2 weeks for wound check.  Patient may shower after post-op day #3 (10/30/2017). The dressing is to be removed on post op day #9 (11/5/2017). IF THE DRESSING BECOMES SOILED BEFORE THE REMOVAL DATE, CHANGE WITH A SIMILAR DRESSING. IF THE DRESSING BECOMES STAINED WITH DISCHARGE, CONTACT THE OFFICE FOR FURTHER DIRECTIONS. The patient will contact the office if the wound becomes red, has increasing pain, develops bleeding or discharge, an injury occurs, or has other concerns. The patient will continue PT consistent with total knee replacement. The patient will continue aspirin 325mg twice daily for 6 weeks for blood clot prevention. The patient will take OXYCODONE AND TYLENOL for pain control and titrate according to prescription and patient needs. The patient will take Senna-S while taking oxycodone to prevent narcotic associated constipation.  Additionally, increase water intake (drink at least 8 glasses of water daily) and try adding fiber to the diet by eating fruits, vegetables and foods that are rich in grains. If constipation is experienced, contact the medical/primary care provider to discuss further treatment options. The patient is FULL weight bearing. Elevation of the lower leg is recommended to reduce swelling.

## 2017-10-27 NOTE — DISCHARGE NOTE ADULT - HOSPITAL COURSE
The patient underwent a RIGHT TOTAL KNEE REPLACEMENT on 10/27/2017. The patient received antibiotics consistent with SCIP guidelines. The patient underwent the procedure and had no intra-operative complications. Post-operatively, the patient was seen by medicine and PT. The patient received ASPIRIN for DVTP. The patient received pain medications per orthopedic pain managment protocol and the pain was appropriately controlled. The patient did not have any post-operative medical complications. The patient was discharged in stable condition.

## 2017-10-27 NOTE — CONSULT NOTE ADULT - ASSESSMENT
HPI: 57 year old female with left knee pain for the last few years, she had right knee replacement last year, pain was getting worst ,  now S/P right knee arthroplasty for OA, POD#0, patient seen and examined on PACU .

## 2017-10-27 NOTE — DISCHARGE NOTE ADULT - PATIENT PORTAL LINK FT
“You can access the [facility] Patient Portal, offered by [facility], by registering with the following website: [email or web address]”

## 2017-10-27 NOTE — PHYSICAL THERAPY INITIAL EVALUATION ADULT - ADDITIONAL COMMENTS
PT reports living with spouse and three adult sons in a private house. 2/3 steps to enter with rail. None indoors. Independent at baseline, uses SAC when out of house. Also owns RW, commode, and shower chair from previous TKA.

## 2017-10-27 NOTE — PHYSICAL THERAPY INITIAL EVALUATION ADULT - GENERAL OBSERVATIONS, REHAB EVAL
Pt received lying on stretcher in PACU, NAD. (+) TEDs bilateral LE's, (+) cardiac monitor, (+) left UE. Agreeable to PT.

## 2017-10-27 NOTE — DISCHARGE NOTE ADULT - MEDICATION SUMMARY - MEDICATIONS TO TAKE
I will START or STAY ON the medications listed below when I get home from the hospital:    aspirin 325 mg oral delayed release tablet  -- 1 tab(s) by mouth 2 times a day for 6 weeks postop for DVT propx MDD:2  -- Indication: For Dvt propx    oxyCODONE 10 mg oral tablet  -- 1 tab(s) by mouth every 4-6 hours PRN MDD:6  -- Indication: For PAIN CONTROL    Lyrica 75 mg oral capsule  -- 1 cap(s) by mouth 2 times a day  -- Indication: For home med    simvastatin 40 mg oral tablet  -- 1 tab(s) by mouth once a day (at bedtime)  -- Indication: For home med    Senna S 50 mg-8.6 mg oral tablet  -- 1-2 tab(s) by mouth once a day (at bedtime), As Needed MDD:2  -- Indication: For constipation I will START or STAY ON the medications listed below when I get home from the hospital:    aspirin 325 mg oral delayed release tablet  -- 1 tab(s) by mouth 2 times a day for 6 weeks postop for DVT propx MDD:2  -- Indication: For Dvt propx    oxyCODONE 10 mg oral tablet  -- 1 tab(s) by mouth every 4-6 hours PRN MDD:6  -- Indication: For Pain    Lyrica 75 mg oral capsule  -- 1 cap(s) by mouth 2 times a day  -- Indication: For home med    simvastatin 40 mg oral tablet  -- 1 tab(s) by mouth once a day (at bedtime)  -- Indication: For home med    Senna S 50 mg-8.6 mg oral tablet  -- 1-2 tab(s) by mouth once a day (at bedtime), As Needed MDD:2  -- Indication: For constipation

## 2017-10-27 NOTE — DISCHARGE NOTE ADULT - CARE PROVIDER_API CALL
Ramin Heredia (MD), Orthopaedic Surgery  [address]  [place] [address]  Phone: [phone]  Fax: [phone]

## 2017-10-27 NOTE — PROGRESS NOTE ADULT - SUBJECTIVE AND OBJECTIVE BOX
Orthopedic PA Postop Note  Patient S/P RIGHT TKA  Patient in bed comfortable   RIGHT Leg  Dressing C/D/I with compression stocking applied  Pulse intact   Calf Soft NT  Dorsi/Plantar Flexion intact     Vital Signs Last 24 Hrs  T(C): 36.5 (27 Oct 2017 18:00), Max: 36.6 (27 Oct 2017 12:00)  T(F): 97.7 (27 Oct 2017 18:00), Max: 97.9 (27 Oct 2017 12:00)  HR: 74 (27 Oct 2017 19:00) (67 - 81)  BP: 124/74 (27 Oct 2017 19:00) (92/65 - 132/57)  BP(mean): --  RR: 15 (27 Oct 2017 19:00) (11 - 19)  SpO2: 95% (27 Oct 2017 19:00) (93% - 99%)    < from: Xray Knee 1 or 2 Views, Left (10.27.17 @ 13:42) >     EXAM:  KNEE-LEFT                          PROCEDURE DATE:  10/27/2017          INTERPRETATION:      Left knee    Portable AP and lateral views.    Postoperative evaluation. Status post left knee replacement.    The prosthetic components are in good position. The alignment is normal.   Gas and fluid is present in the suprapatellar recess.    Impression: Left total knee replacement in good position                ISMAEL RUIZ M.D., ATTENDING RADIOLOGIST  This document has been electronically signed. Oct 27 2017  2:00PM    < end of copied text >      A/P: 57F S/P LEFT TKA  1. DVTP - ASA  2. PT - WBAT  3. Pain Control as clinically indicated Orthopedic PA Postop Note  Patient S/P LEFT TKA  Patient in bed comfortable   LEFT Leg  Dressing C/D/I with compression stocking applied  Pulse intact   Calf Soft NT  Dorsi/Plantar Flexion intact     Vital Signs Last 24 Hrs  T(C): 36.5 (27 Oct 2017 18:00), Max: 36.6 (27 Oct 2017 12:00)  T(F): 97.7 (27 Oct 2017 18:00), Max: 97.9 (27 Oct 2017 12:00)  HR: 74 (27 Oct 2017 19:00) (67 - 81)  BP: 124/74 (27 Oct 2017 19:00) (92/65 - 132/57)  BP(mean): --  RR: 15 (27 Oct 2017 19:00) (11 - 19)  SpO2: 95% (27 Oct 2017 19:00) (93% - 99%)    < from: Xray Knee 1 or 2 Views, Left (10.27.17 @ 13:42) >     EXAM:  KNEE-LEFT                          PROCEDURE DATE:  10/27/2017          INTERPRETATION:      Left knee    Portable AP and lateral views.    Postoperative evaluation. Status post left knee replacement.    The prosthetic components are in good position. The alignment is normal.   Gas and fluid is present in the suprapatellar recess.    Impression: Left total knee replacement in good position                ISMAEL RUIZ M.D., ATTENDING RADIOLOGIST  This document has been electronically signed. Oct 27 2017  2:00PM    < end of copied text >      A/P: 57F S/P LEFT TKA  1. DVTP - ASA  2. PT - WBAT  3. Pain Control as clinically indicated

## 2017-10-27 NOTE — DISCHARGE NOTE ADULT - CARE PLAN
Principal Discharge DX:	Primary osteoarthritis of left knee  Goal:	Improved function and pain control  Instructions for follow-up, activity and diet:	The patient will be seen in the office in 2 weeks for wound check. Sutures/Staples/Tape will be removed at that time. Patient may shower after post-op day #3 (10/30/2017). The dressing is to be removed on post op day #9 (11/5/2017). IF THE DRESSING BECOMES SOILED BEFORE THE REMOVAL DATE, CHANGE WITH A SIMILAR DRESSING. IF THE DRESSING BECOMES STAINED WITH DISCHARGE, CONTACT THE OFFICE FOR FURTHER DIRECTIONS. The patient will contact the office if the wound becomes red, has increasing pain, develops bleeding or discharge, an injury occurs, or has other concerns. The patient will continue PT consistent with total knee replacement. The patient will continue aspirin 325mg twice daily for 6 weeks for blood clot prevention. The patient will take OXYCODONE AND TYLENOL for pain control and titrate according to prescription and patient needs. The patient will take Senna-S while taking oxycodone to prevent narcotic associated constipation.  Additionally, increase water intake (drink at least 8 glasses of water daily) and try adding fiber to the diet by eating fruits, vegetables and foods that are rich in grains. If constipation is experienced, contact the medical/primary care provider to discuss further treatment options. The patient is FULL weight bearing. Elevation of the lower leg is recommended to reduce swelling. Principal Discharge DX:	Primary osteoarthritis of left knee  Goal:	Improved function and pain control  Instructions for follow-up, activity and diet:	The patient will be seen in the office in 2 weeks for wound check.  Patient may shower after post-op day #3 (10/30/2017). The dressing is to be removed on post op day #9 (11/5/2017). IF THE DRESSING BECOMES SOILED BEFORE THE REMOVAL DATE, CHANGE WITH A SIMILAR DRESSING. IF THE DRESSING BECOMES STAINED WITH DISCHARGE, CONTACT THE OFFICE FOR FURTHER DIRECTIONS. The patient will contact the office if the wound becomes red, has increasing pain, develops bleeding or discharge, an injury occurs, or has other concerns. The patient will continue PT consistent with total knee replacement. The patient will continue aspirin 325mg twice daily for 6 weeks for blood clot prevention. The patient will take OXYCODONE AND TYLENOL for pain control and titrate according to prescription and patient needs. The patient will take Senna-S while taking oxycodone to prevent narcotic associated constipation.  Additionally, increase water intake (drink at least 8 glasses of water daily) and try adding fiber to the diet by eating fruits, vegetables and foods that are rich in grains. If constipation is experienced, contact the medical/primary care provider to discuss further treatment options. The patient is FULL weight bearing. Elevation of the lower leg is recommended to reduce swelling.

## 2017-10-27 NOTE — CONSULT NOTE ADULT - CONSULT REASON
medical management post operative requested medical management post operative requested  s/p L TKA , POD # 0

## 2017-10-27 NOTE — PHYSICAL THERAPY INITIAL EVALUATION ADULT - ACTIVE RANGE OF MOTION EXAMINATION, REHAB EVAL
left knee flexion to approx 70 degrees/Right LE Active ROM was WFL (within functional limits)/bilateral upper extremity Active ROM was WFL (within functional limits)

## 2017-10-27 NOTE — BRIEF OPERATIVE NOTE - PROCEDURE
<<-----Click on this checkbox to enter Procedure Total knee arthroplasty  10/27/2017  Left computer assisted TKR  Active  SHEILA

## 2017-10-27 NOTE — CONSULT NOTE ADULT - PROBLEM SELECTOR RECOMMENDATION 9
S/P L TKA , POD # 0  PT/OT/pain mgmt  DVT prophylaxis- as per ortho  Abx as per SCIP  Incentive spirometry  Prophylaxis of opioid  induced constipation

## 2017-10-28 DIAGNOSIS — D50.0 IRON DEFICIENCY ANEMIA SECONDARY TO BLOOD LOSS (CHRONIC): ICD-10-CM

## 2017-10-28 LAB
ANION GAP SERPL CALC-SCNC: 14 MMOL/L — SIGNIFICANT CHANGE UP (ref 5–17)
BUN SERPL-MCNC: 17 MG/DL — SIGNIFICANT CHANGE UP (ref 8–20)
CALCIUM SERPL-MCNC: 9 MG/DL — SIGNIFICANT CHANGE UP (ref 8.6–10.2)
CHLORIDE SERPL-SCNC: 101 MMOL/L — SIGNIFICANT CHANGE UP (ref 98–107)
CO2 SERPL-SCNC: 24 MMOL/L — SIGNIFICANT CHANGE UP (ref 22–29)
CREAT SERPL-MCNC: 0.53 MG/DL — SIGNIFICANT CHANGE UP (ref 0.5–1.3)
GLUCOSE SERPL-MCNC: 117 MG/DL — HIGH (ref 70–115)
HCT VFR BLD CALC: 37.6 % — SIGNIFICANT CHANGE UP (ref 37–47)
HGB BLD-MCNC: 12.4 G/DL — SIGNIFICANT CHANGE UP (ref 12–16)
MCHC RBC-ENTMCNC: 29.2 PG — SIGNIFICANT CHANGE UP (ref 27–31)
MCHC RBC-ENTMCNC: 33 G/DL — SIGNIFICANT CHANGE UP (ref 32–36)
MCV RBC AUTO: 88.5 FL — SIGNIFICANT CHANGE UP (ref 81–99)
PLATELET # BLD AUTO: 261 K/UL — SIGNIFICANT CHANGE UP (ref 150–400)
POTASSIUM SERPL-MCNC: 4.3 MMOL/L — SIGNIFICANT CHANGE UP (ref 3.5–5.3)
POTASSIUM SERPL-SCNC: 4.3 MMOL/L — SIGNIFICANT CHANGE UP (ref 3.5–5.3)
RBC # BLD: 4.25 M/UL — LOW (ref 4.4–5.2)
RBC # FLD: 13.6 % — SIGNIFICANT CHANGE UP (ref 11–15.6)
SODIUM SERPL-SCNC: 139 MMOL/L — SIGNIFICANT CHANGE UP (ref 135–145)
WBC # BLD: 9.1 K/UL — SIGNIFICANT CHANGE UP (ref 4.8–10.8)
WBC # FLD AUTO: 9.1 K/UL — SIGNIFICANT CHANGE UP (ref 4.8–10.8)

## 2017-10-28 PROCEDURE — 99232 SBSQ HOSP IP/OBS MODERATE 35: CPT

## 2017-10-28 RX ORDER — OXYCODONE HYDROCHLORIDE 5 MG/1
1 TABLET ORAL
Qty: 42 | Refills: 0 | OUTPATIENT
Start: 2017-10-28

## 2017-10-28 RX ORDER — SENNOSIDES/DOCUSATE SODIUM 8.6MG-50MG
2 TABLET ORAL
Qty: 14 | Refills: 0 | OUTPATIENT
Start: 2017-10-28

## 2017-10-28 RX ORDER — INFLUENZA VIRUS VACCINE 15; 15; 15; 15 UG/.5ML; UG/.5ML; UG/.5ML; UG/.5ML
0.5 SUSPENSION INTRAMUSCULAR ONCE
Qty: 0 | Refills: 0 | Status: DISCONTINUED | OUTPATIENT
Start: 2017-10-28 | End: 2017-10-29

## 2017-10-28 RX ORDER — ASPIRIN/CALCIUM CARB/MAGNESIUM 324 MG
1 TABLET ORAL
Qty: 86 | Refills: 0 | OUTPATIENT
Start: 2017-10-28

## 2017-10-28 RX ADMIN — OXYCODONE HYDROCHLORIDE 10 MILLIGRAM(S): 5 TABLET ORAL at 00:01

## 2017-10-28 RX ADMIN — Medication 15 MILLIGRAM(S): at 00:01

## 2017-10-28 RX ADMIN — Medication 75 MILLIGRAM(S): at 17:11

## 2017-10-28 RX ADMIN — Medication 325 MILLIGRAM(S): at 06:40

## 2017-10-28 RX ADMIN — HYDROMORPHONE HYDROCHLORIDE 2 MILLIGRAM(S): 2 INJECTION INTRAMUSCULAR; INTRAVENOUS; SUBCUTANEOUS at 10:07

## 2017-10-28 RX ADMIN — Medication 100 MILLIGRAM(S): at 06:39

## 2017-10-28 RX ADMIN — SODIUM CHLORIDE 3 MILLILITER(S): 9 INJECTION INTRAMUSCULAR; INTRAVENOUS; SUBCUTANEOUS at 06:42

## 2017-10-28 RX ADMIN — Medication 15 MILLIGRAM(S): at 01:00

## 2017-10-28 RX ADMIN — Medication 75 MILLIGRAM(S): at 06:40

## 2017-10-28 RX ADMIN — OXYCODONE HYDROCHLORIDE 10 MILLIGRAM(S): 5 TABLET ORAL at 15:49

## 2017-10-28 RX ADMIN — HYDROMORPHONE HYDROCHLORIDE 2 MILLIGRAM(S): 2 INJECTION INTRAMUSCULAR; INTRAVENOUS; SUBCUTANEOUS at 09:37

## 2017-10-28 RX ADMIN — OXYCODONE HYDROCHLORIDE 10 MILLIGRAM(S): 5 TABLET ORAL at 19:58

## 2017-10-28 RX ADMIN — OXYCODONE HYDROCHLORIDE 10 MILLIGRAM(S): 5 TABLET ORAL at 16:19

## 2017-10-28 RX ADMIN — SODIUM CHLORIDE 3 MILLILITER(S): 9 INJECTION INTRAMUSCULAR; INTRAVENOUS; SUBCUTANEOUS at 21:07

## 2017-10-28 RX ADMIN — SIMVASTATIN 40 MILLIGRAM(S): 20 TABLET, FILM COATED ORAL at 21:10

## 2017-10-28 RX ADMIN — Medication 15 MILLIGRAM(S): at 07:00

## 2017-10-28 RX ADMIN — OXYCODONE HYDROCHLORIDE 10 MILLIGRAM(S): 5 TABLET ORAL at 01:00

## 2017-10-28 RX ADMIN — Medication 15 MILLIGRAM(S): at 06:36

## 2017-10-28 RX ADMIN — SODIUM CHLORIDE 3 MILLILITER(S): 9 INJECTION INTRAMUSCULAR; INTRAVENOUS; SUBCUTANEOUS at 12:23

## 2017-10-28 RX ADMIN — OXYCODONE HYDROCHLORIDE 10 MILLIGRAM(S): 5 TABLET ORAL at 20:50

## 2017-10-28 RX ADMIN — Medication 325 MILLIGRAM(S): at 17:11

## 2017-10-28 RX ADMIN — OXYCODONE HYDROCHLORIDE 10 MILLIGRAM(S): 5 TABLET ORAL at 06:37

## 2017-10-28 RX ADMIN — OXYCODONE HYDROCHLORIDE 10 MILLIGRAM(S): 5 TABLET ORAL at 07:00

## 2017-10-28 NOTE — PROGRESS NOTE ADULT - SUBJECTIVE AND OBJECTIVE BOX
57 year old Female, S/P Left Total Knee Replacement under Spinal Anesthesia and IV Sedation on 10/27/17. Patient is resting comfortably, vital signs are stable. Patient had no complaints or complications with regard to Anesthesia Care.

## 2017-10-28 NOTE — PROGRESS NOTE ADULT - SUBJECTIVE AND OBJECTIVE BOX
MARC CASTRO  [id number]    History: Patient seen and eval at bedside. Patient is doing well and is comfortable. The patient's pain is controlled using the prescribed pain medications. The patient is participating in physical therapy. Denies nausea, vomiting, chest pain, shortness of breath, abdominal pain or fever. No new complaints.    T(C): 37.1 (10-28-17 @ 08:06), Max: 37.1 (10-28-17 @ 01:26)  HR: 81 (10-28-17 @ 08:06) (67 - 81)  BP: 125/76 (10-28-17 @ 08:06) (92/65 - 140/62)  RR: 18 (10-28-17 @ 08:06) (11 - 19)  SpO2: 96% (10-28-17 @ 08:06) (91% - 96%)                          12.4   9.1   )-----------( 261      ( 28 Oct 2017 07:43 )             37.6     10-28    139  |  101  |  17.0  ----------------------------<  117<H>  4.3   |  24.0  |  0.53    Ca    9.0      28 Oct 2017 07:43    PE: NAD, alert, awake  Left LE:   Knee dressing C/D/I, no drainage, no bleeding  EHL/TA/FHL/GS intact, DP pulse 2+  Gross sensation to LT intact distally, Calf soft, NT B/L    Primary Orthopedic Assessment:  •s/p LEFT total knee replacement POD#1    Plan:   ·	DVT prophylaxis as prescribed - ASA, including use of compression devices and ankle pumps  ·	Continue physical therapy: Weightbearing as tolerated of the left lower extremity with assistance of a walker  ·	Incentive spirometry encouraged  ·	Pain control as clinically indicated  ·	Discharge planning: home poss today or tomorrow pending PT and med clearance

## 2017-10-28 NOTE — PATIENT PROFILE ADULT. - PRO PAIN LIFE ADAPT
decreased activity level/withdrawal from social contact/inability or reluctance to perform ADLs/inability to concentrate/inability to enjoy life/inability to work/inability to sleep

## 2017-10-28 NOTE — PROGRESS NOTE ADULT - SUBJECTIVE AND OBJECTIVE BOX
MARC CASTRO    [id number]    57y      Female    HPI : Lt knee pain 2/2 OA - s/p Lt TKA POD #1    CC: Lt knee pain    INTERVAL HPI/OVERNIGHT EVENTS: no acute events     REVIEW OF SYSTEMS:    CONSTITUTIONAL: No fever  RESPIRATORY: No shortness of breath  GASTROINTESTINAL: No abdominal or epigastric pain. No nausea, vomiting        Vital Signs Last 24 Hrs  T(C): 37.1 (28 Oct 2017 08:06), Max: 37.1 (28 Oct 2017 01:26)  T(F): 98.7 (28 Oct 2017 08:06), Max: 98.8 (28 Oct 2017 01:26)  HR: 81 (28 Oct 2017 08:06) (67 - 81)  BP: 125/76 (28 Oct 2017 08:06) (92/65 - 140/62)  BP(mean): --  RR: 18 (28 Oct 2017 08:06) (11 - 19)  SpO2: 96% (28 Oct 2017 08:06) (91% - 96%)    PHYSICAL EXAM:    GENERAL: NAD, well-groomed  HEENT: PERRL, +EOMI  NECK: soft, Supple, No JVD,   CHEST/LUNG: Clear to percussion bilaterally; No wheezing  HEART: S1S2+, Regular rate and rhythm; No murmurs, rubs, or gallops  ABDOMEN: Soft, Nontender, Nondistended; Bowel sounds present  EXTREMITIES:  2+ Peripheral Pulses, No clubbing, cyanosis, or edema  SKIN: No rashes or lesions  NEURO: AAOX3, no focal deficits, no motor r sensory loss  PSYCH: normal mood      10-27 @ 07:01  -  10-28 @ 07:00  --------------------------------------------------------  IN: 1200 mL / OUT: 0 mL / NET: 1200 mL        LABS:                        12.4   9.1   )-----------( 261      ( 28 Oct 2017 07:43 )             37.6     10-28    139  |  101  |  17.0  ----------------------------<  117<H>  4.3   |  24.0  |  0.53    Ca    9.0      28 Oct 2017 07:43              MEDICATIONS  (STANDING):  acetaminophen   Tablet. 975 milliGRAM(s) Oral every 8 hours  aspirin enteric coated 325 milliGRAM(s) Oral two times a day  docusate sodium 100 milliGRAM(s) Oral three times a day  influenza   Vaccine 0.5 milliLiter(s) IntraMuscular once  lactated ringers. 1000 milliLiter(s) (100 mL/Hr) IV Continuous <Continuous>  polyethylene glycol 3350 17 Gram(s) Oral daily  pregabalin 75 milliGRAM(s) Oral two times a day  simvastatin 40 milliGRAM(s) Oral at bedtime  sodium chloride 0.9% lock flush 3 milliLiter(s) IV Push every 8 hours    MEDICATIONS  (PRN):  aluminum hydroxide/magnesium hydroxide/simethicone Suspension 30 milliLiter(s) Oral four times a day PRN Indigestion  HYDROmorphone   Tablet 2 milliGRAM(s) Oral every 4 hours PRN Severe Pain (7 - 10)  HYDROmorphone  Injectable 0.5 milliGRAM(s) IV Push every 4 hours PRN Severe Pain breakthrough  magnesium hydroxide Suspension 30 milliLiter(s) Oral daily PRN Constipation  ondansetron Injectable 4 milliGRAM(s) IV Push every 6 hours PRN Nausea and/or Vomiting  oxyCODONE    IR 5 milliGRAM(s) Oral every 3 hours PRN Mild Pain  oxyCODONE    IR 10 milliGRAM(s) Oral every 3 hours PRN Moderate Pain  senna 2 Tablet(s) Oral at bedtime PRN Constipation      RADIOLOGY & ADDITIONAL TESTS: MARC CASTRO    [id number]    57y      Female    HPI : Lt knee pain 2/2 OA - s/p Lt TKA POD #1    CC: Lt knee pain    INTERVAL HPI/OVERNIGHT EVENTS: no acute events     REVIEW OF SYSTEMS:    CONSTITUTIONAL: No fever  RESPIRATORY: No shortness of breath  GASTROINTESTINAL: No abdominal or epigastric pain. No nausea, vomiting        Vital Signs Last 24 Hrs  T(C): 37.1 (28 Oct 2017 08:06), Max: 37.1 (28 Oct 2017 01:26)  T(F): 98.7 (28 Oct 2017 08:06), Max: 98.8 (28 Oct 2017 01:26)  HR: 81 (28 Oct 2017 08:06) (67 - 81)  BP: 125/76 (28 Oct 2017 08:06) (92/65 - 140/62)  BP(mean): --  RR: 18 (28 Oct 2017 08:06) (11 - 19)  SpO2: 96% (28 Oct 2017 08:06) (91% - 96%)    PHYSICAL EXAM:    GENERAL: NAD, well-groomed  HEENT: PERRL, +EOMI  NECK: soft, Supple, No JVD,   CHEST/LUNG: Clear to percussion bilaterally; No wheezing  HEART: S1S2+, Regular rate and rhythm; No murmurs, rubs, or gallops  ABDOMEN: Soft, Nontender, distended; Bowel sounds present  EXTREMITIES:   No clubbing, cyanosis, or edema  NEURO: AAOX3        10-27 @ 07:01  -  10-28 @ 07:00  --------------------------------------------------------  IN: 1200 mL / OUT: 0 mL / NET: 1200 mL        LABS:                        12.4   9.1   )-----------( 261      ( 28 Oct 2017 07:43 )             37.6     10-28    139  |  101  |  17.0  ----------------------------<  117<H>  4.3   |  24.0  |  0.53    Ca    9.0      28 Oct 2017 07:43              MEDICATIONS  (STANDING):  acetaminophen   Tablet. 975 milliGRAM(s) Oral every 8 hours  aspirin enteric coated 325 milliGRAM(s) Oral two times a day  docusate sodium 100 milliGRAM(s) Oral three times a day  influenza   Vaccine 0.5 milliLiter(s) IntraMuscular once  lactated ringers. 1000 milliLiter(s) (100 mL/Hr) IV Continuous <Continuous>  polyethylene glycol 3350 17 Gram(s) Oral daily  pregabalin 75 milliGRAM(s) Oral two times a day  simvastatin 40 milliGRAM(s) Oral at bedtime  sodium chloride 0.9% lock flush 3 milliLiter(s) IV Push every 8 hours    MEDICATIONS  (PRN):  aluminum hydroxide/magnesium hydroxide/simethicone Suspension 30 milliLiter(s) Oral four times a day PRN Indigestion  HYDROmorphone   Tablet 2 milliGRAM(s) Oral every 4 hours PRN Severe Pain (7 - 10)  HYDROmorphone  Injectable 0.5 milliGRAM(s) IV Push every 4 hours PRN Severe Pain breakthrough  magnesium hydroxide Suspension 30 milliLiter(s) Oral daily PRN Constipation  ondansetron Injectable 4 milliGRAM(s) IV Push every 6 hours PRN Nausea and/or Vomiting  oxyCODONE    IR 5 milliGRAM(s) Oral every 3 hours PRN Mild Pain  oxyCODONE    IR 10 milliGRAM(s) Oral every 3 hours PRN Moderate Pain  senna 2 Tablet(s) Oral at bedtime PRN Constipation      RADIOLOGY & ADDITIONAL TESTS:

## 2017-10-29 VITALS
SYSTOLIC BLOOD PRESSURE: 122 MMHG | DIASTOLIC BLOOD PRESSURE: 83 MMHG | HEART RATE: 84 BPM | RESPIRATION RATE: 18 BRPM | OXYGEN SATURATION: 93 % | TEMPERATURE: 99 F

## 2017-10-29 LAB
ANION GAP SERPL CALC-SCNC: 11 MMOL/L — SIGNIFICANT CHANGE UP (ref 5–17)
BUN SERPL-MCNC: 9 MG/DL — SIGNIFICANT CHANGE UP (ref 8–20)
CALCIUM SERPL-MCNC: 8.4 MG/DL — LOW (ref 8.6–10.2)
CHLORIDE SERPL-SCNC: 99 MMOL/L — SIGNIFICANT CHANGE UP (ref 98–107)
CO2 SERPL-SCNC: 26 MMOL/L — SIGNIFICANT CHANGE UP (ref 22–29)
CREAT SERPL-MCNC: 0.49 MG/DL — LOW (ref 0.5–1.3)
GLUCOSE SERPL-MCNC: 134 MG/DL — HIGH (ref 70–115)
HCT VFR BLD CALC: 37.3 % — SIGNIFICANT CHANGE UP (ref 37–47)
HGB BLD-MCNC: 12.2 G/DL — SIGNIFICANT CHANGE UP (ref 12–16)
MCHC RBC-ENTMCNC: 29 PG — SIGNIFICANT CHANGE UP (ref 27–31)
MCHC RBC-ENTMCNC: 32.7 G/DL — SIGNIFICANT CHANGE UP (ref 32–36)
MCV RBC AUTO: 88.8 FL — SIGNIFICANT CHANGE UP (ref 81–99)
PLATELET # BLD AUTO: 258 K/UL — SIGNIFICANT CHANGE UP (ref 150–400)
POTASSIUM SERPL-MCNC: 4.3 MMOL/L — SIGNIFICANT CHANGE UP (ref 3.5–5.3)
POTASSIUM SERPL-SCNC: 4.3 MMOL/L — SIGNIFICANT CHANGE UP (ref 3.5–5.3)
RBC # BLD: 4.2 M/UL — LOW (ref 4.4–5.2)
RBC # FLD: 13.3 % — SIGNIFICANT CHANGE UP (ref 11–15.6)
SODIUM SERPL-SCNC: 136 MMOL/L — SIGNIFICANT CHANGE UP (ref 135–145)
WBC # BLD: 8.8 K/UL — SIGNIFICANT CHANGE UP (ref 4.8–10.8)
WBC # FLD AUTO: 8.8 K/UL — SIGNIFICANT CHANGE UP (ref 4.8–10.8)

## 2017-10-29 PROCEDURE — 99232 SBSQ HOSP IP/OBS MODERATE 35: CPT

## 2017-10-29 RX ORDER — OXYCODONE HYDROCHLORIDE 5 MG/1
1 TABLET ORAL
Qty: 42 | Refills: 0 | OUTPATIENT
Start: 2017-10-29

## 2017-10-29 RX ADMIN — Medication 325 MILLIGRAM(S): at 05:43

## 2017-10-29 RX ADMIN — OXYCODONE HYDROCHLORIDE 10 MILLIGRAM(S): 5 TABLET ORAL at 09:43

## 2017-10-29 RX ADMIN — Medication 75 MILLIGRAM(S): at 05:43

## 2017-10-29 RX ADMIN — OXYCODONE HYDROCHLORIDE 10 MILLIGRAM(S): 5 TABLET ORAL at 10:30

## 2017-10-29 RX ADMIN — OXYCODONE HYDROCHLORIDE 10 MILLIGRAM(S): 5 TABLET ORAL at 00:50

## 2017-10-29 RX ADMIN — OXYCODONE HYDROCHLORIDE 10 MILLIGRAM(S): 5 TABLET ORAL at 06:11

## 2017-10-29 RX ADMIN — SODIUM CHLORIDE 3 MILLILITER(S): 9 INJECTION INTRAMUSCULAR; INTRAVENOUS; SUBCUTANEOUS at 05:51

## 2017-10-29 RX ADMIN — HYDROMORPHONE HYDROCHLORIDE 2 MILLIGRAM(S): 2 INJECTION INTRAMUSCULAR; INTRAVENOUS; SUBCUTANEOUS at 15:00

## 2017-10-29 RX ADMIN — OXYCODONE HYDROCHLORIDE 10 MILLIGRAM(S): 5 TABLET ORAL at 05:43

## 2017-10-29 RX ADMIN — OXYCODONE HYDROCHLORIDE 10 MILLIGRAM(S): 5 TABLET ORAL at 00:16

## 2017-10-29 NOTE — PROGRESS NOTE ADULT - ASSESSMENT
HPI: 57 year old female with left knee pain 2/2 OA, Now s/p Lt TKA  POD#2.
HPI: 57 year old female with left knee pain 2/2 OA, Now s/p Lt TKA  POD#1.

## 2017-10-29 NOTE — PROGRESS NOTE ADULT - SUBJECTIVE AND OBJECTIVE BOX
MARC CASTRO  [id number]    History: 57y Female is status post left total knee arthroplasty on 10/27/2017, POD # 02. Patient is doing well and is comfortable. The patient's pain is controlled using the prescribed pain medications. The patient is participating in physical therapy. Denies nausea, vomiting, chest pain, shortness of breath, abdominal pain or fever. No new complaints.                              12.2   8.8   )-----------( 258      ( 29 Oct 2017 07:12 )             37.3     10-29    136  |  99  |  9.0  ----------------------------<  134<H>  4.3   |  26.0  |  0.49<L>    Ca    8.4<L>      29 Oct 2017 07:12    Vital Signs Last 24 Hrs  T(C): 37.1 (29 Oct 2017 08:00), Max: 37.7 (28 Oct 2017 20:00)  T(F): 98.8 (29 Oct 2017 08:00), Max: 99.9 (28 Oct 2017 20:00)  HR: 84 (29 Oct 2017 08:00) (84 - 95)  BP: 122/83 (29 Oct 2017 08:00) (122/83 - 142/87)  BP(mean): --  RR: 18 (29 Oct 2017 08:00) (18 - 18)  SpO2: 93% (29 Oct 2017 08:00) (93% - 95%)      Physical exam: The left knee dressing is clean, dry and intact. No drainage or discharge. No erythema is noted. No blistering. No ecchymosis.  New dressing placed. The calf is supple nontender. Passive range of motion is acceptable to due postoperative pain. No calf tenderness. Sensation to light touch is grossly intact distally. Motor function distally is 5/5. Extensor hallucis longus and flexor hallucis longus are intact. No foot drop. 2+ dorsalis pedis pulse. Capillary refill is less than 2 seconds. No cyanosis.    Primary Orthopedic Assessment:  •	s/p LEFT total knee replacement pod #2	    Plan:   •	DVT prophylaxis asa 325mg bid, including use of compression devices and ankle pumps  •	Continue physical therapy  •	Weightbearing as tolerated of the right lower extremity with assistance of a walker  •	Incentive spirometry encouraged  •	Pain control as clinically indicated  •	Discharge planning – anticipated discharge is Home today

## 2017-10-29 NOTE — H&P PST ADULT - BSA (M2)
84 M with recently diagnosed metastatic pancreatic cancer currently on chemotherapy s/p 2 sessions, who presents with fatigue, fevers, non-productive cough since last chemotherapy session about 1 week ago, found to have severe sepsis in setting leukopenia.   #Neuro  - AAO x 4 mentating well  #Cardiovascular  - currently with hypotension in setting of sepsis  - on levophed- titrate to maintain MAP > 65  - c/w IVF hydration  - continue to monitor q1 vitals  #Pulmonary  - currently saturating > 95% on room air  - CXR without consolidation, pleural effusions  - obtain sputum ctx    #ID  - Patient with severe sepsis in setting of leukopenia, unclear source  - Meropenem and Azithro for empiric coverage, including pseudomonas and legionella   - d/c vanco for now  - f/u blood and urine ctx  - f/u urine legionella    #Heme/Onc  - Recent dx of pancreatic cancer w/ hepatic and peritoneal mets   - s/p first cycle of chemotherapy on 10/23/2017 c/b leukopenia with fevers  - Anemia, likely of chronic disease in setting of malignancy- no overt bleeding  - will give filgrastim 350 mcg; f/u CBC  - prn fentanyl /dilaudid as needed for pain  - consult  heme onc    #Renal  - with MOMO, likely prerenal in setting of dehydration  - c/w IVF hydration  - f/u BMP    #GI  - with abdominal pain in setting of metasatic     #PPX  - heparin SQ for DVT ppx      #GOC  - pt expressed wishes to be DNR/DNI status  - family wish to do trial of abx, levophed  - ultimately if no improvement, plan to pursue home with home hospice 1.94

## 2017-10-29 NOTE — PROGRESS NOTE ADULT - SUBJECTIVE AND OBJECTIVE BOX
MARC CASTRO    [id number]    57y      Female    HPI : Lt knee pain 2/2 OA - s/p Lt TKA POD #2    CC: Lt knee pain, better with pain meds  no other complaints.     INTERVAL HPI/OVERNIGHT EVENTS: no acute events     REVIEW OF SYSTEMS:    CONSTITUTIONAL: No fever  RESPIRATORY: No shortness of breath  GASTROINTESTINAL: No abdominal or epigastric pain. No nausea, vomiting        Vital Signs Last 24 Hrs  T(C): 37.1 (29 Oct 2017 08:00), Max: 37.7 (28 Oct 2017 20:00)  T(F): 98.8 (29 Oct 2017 08:00), Max: 99.9 (28 Oct 2017 20:00)  HR: 84 (29 Oct 2017 08:00) (84 - 95)  BP: 122/83 (29 Oct 2017 08:00) (122/83 - 142/87)  BP(mean): --  RR: 18 (29 Oct 2017 08:00) (18 - 18)  SpO2: 93% (29 Oct 2017 08:00) (93% - 95%)      PHYSICAL EXAM:    GENERAL: NAD, well-groomed  HEENT: PERRL, +EOMI  NECK: soft, Supple, No JVD,   CHEST/LUNG: Clear to percussion bilaterally; No wheezing  HEART: S1S2+, Regular rate and rhythm; No murmurs, rubs, or gallops  ABDOMEN: Soft, Nontender, distended; Bowel sounds present  EXTREMITIES:   No clubbing, cyanosis, or edema  NEURO: AAOX3            10-28 @ 07:01  -  10-29 @ 07:00  --------------------------------------------------------  IN: 960 mL / OUT: 1050 mL / NET: -90 mL        LABS:                        12.2   8.8   )-----------( 258      ( 29 Oct 2017 07:12 )             37.3     10-29    136  |  99  |  9.0  ----------------------------<  134<H>  4.3   |  26.0  |  0.49<L>    Ca    8.4<L>      29 Oct 2017 07:12              MEDICATIONS  (STANDING):  acetaminophen   Tablet. 975 milliGRAM(s) Oral every 8 hours  aspirin enteric coated 325 milliGRAM(s) Oral two times a day  docusate sodium 100 milliGRAM(s) Oral three times a day  influenza   Vaccine 0.5 milliLiter(s) IntraMuscular once  lactated ringers. 1000 milliLiter(s) (100 mL/Hr) IV Continuous <Continuous>  polyethylene glycol 3350 17 Gram(s) Oral daily  pregabalin 75 milliGRAM(s) Oral two times a day  simvastatin 40 milliGRAM(s) Oral at bedtime  sodium chloride 0.9% lock flush 3 milliLiter(s) IV Push every 8 hours    MEDICATIONS  (PRN):  aluminum hydroxide/magnesium hydroxide/simethicone Suspension 30 milliLiter(s) Oral four times a day PRN Indigestion  bisacodyl Suppository 10 milliGRAM(s) Rectal daily PRN If no bowel movement by postoperative day #2  HYDROmorphone   Tablet 2 milliGRAM(s) Oral every 4 hours PRN Severe Pain (7 - 10)  HYDROmorphone  Injectable 0.5 milliGRAM(s) IV Push every 4 hours PRN Severe Pain breakthrough  magnesium hydroxide Suspension 30 milliLiter(s) Oral daily PRN Constipation  ondansetron Injectable 4 milliGRAM(s) IV Push every 6 hours PRN Nausea and/or Vomiting  oxyCODONE    IR 5 milliGRAM(s) Oral every 3 hours PRN Mild Pain  oxyCODONE    IR 10 milliGRAM(s) Oral every 3 hours PRN Moderate Pain  senna 2 Tablet(s) Oral at bedtime PRN Constipation      RADIOLOGY & ADDITIONAL TESTS:

## 2017-10-29 NOTE — PROGRESS NOTE ADULT - PROBLEM SELECTOR PLAN 1
PT/OT/pain mgmt  DVT prophylaxis- as per ortho  Abx as per SCIP  Incentive spirometry  Prophylaxis of opioid  induced constipation.
PT/OT/pain mgmt  DVT prophylaxis- as per ortho  Abx as per SCIP  Incentive spirometry  Prophylaxis of opioid  induced constipation.

## 2017-11-06 ENCOUNTER — OTHER (OUTPATIENT)
Age: 57
End: 2017-11-06

## 2017-11-07 LAB — SURGICAL PATHOLOGY FINAL REPORT - CH: SIGNIFICANT CHANGE UP

## 2017-11-08 ENCOUNTER — RX RENEWAL (OUTPATIENT)
Age: 57
End: 2017-11-08

## 2017-11-13 ENCOUNTER — APPOINTMENT (OUTPATIENT)
Dept: ORTHOPEDIC SURGERY | Facility: CLINIC | Age: 57
End: 2017-11-13
Payer: MEDICAID

## 2017-11-13 VITALS
WEIGHT: 200 LBS | DIASTOLIC BLOOD PRESSURE: 78 MMHG | HEART RATE: 83 BPM | SYSTOLIC BLOOD PRESSURE: 128 MMHG | HEIGHT: 61 IN | BODY MASS INDEX: 37.76 KG/M2

## 2017-11-13 PROCEDURE — 88311 DECALCIFY TISSUE: CPT

## 2017-11-13 PROCEDURE — 97163 PT EVAL HIGH COMPLEX 45 MIN: CPT

## 2017-11-13 PROCEDURE — 73562 X-RAY EXAM OF KNEE 3: CPT | Mod: LT

## 2017-11-13 PROCEDURE — 76000 FLUOROSCOPY <1 HR PHYS/QHP: CPT

## 2017-11-13 PROCEDURE — 80048 BASIC METABOLIC PNL TOTAL CA: CPT

## 2017-11-13 PROCEDURE — 36415 COLL VENOUS BLD VENIPUNCTURE: CPT

## 2017-11-13 PROCEDURE — 86900 BLOOD TYPING SEROLOGIC ABO: CPT

## 2017-11-13 PROCEDURE — 73560 X-RAY EXAM OF KNEE 1 OR 2: CPT

## 2017-11-13 PROCEDURE — C1713: CPT

## 2017-11-13 PROCEDURE — 97116 GAIT TRAINING THERAPY: CPT

## 2017-11-13 PROCEDURE — 85027 COMPLETE CBC AUTOMATED: CPT

## 2017-11-13 PROCEDURE — 88305 TISSUE EXAM BY PATHOLOGIST: CPT

## 2017-11-13 PROCEDURE — 86850 RBC ANTIBODY SCREEN: CPT

## 2017-11-13 PROCEDURE — 88300 SURGICAL PATH GROSS: CPT

## 2017-11-13 PROCEDURE — 86901 BLOOD TYPING SEROLOGIC RH(D): CPT

## 2017-11-13 PROCEDURE — 99024 POSTOP FOLLOW-UP VISIT: CPT

## 2017-11-13 PROCEDURE — 97110 THERAPEUTIC EXERCISES: CPT

## 2017-11-13 PROCEDURE — C1776: CPT

## 2017-11-13 RX ORDER — OXYCODONE 5 MG/1
5 TABLET ORAL
Qty: 90 | Refills: 0 | Status: DISCONTINUED | COMMUNITY
Start: 2017-03-23 | End: 2017-11-13

## 2017-11-13 RX ORDER — OXYCODONE 5 MG/1
5 TABLET ORAL
Qty: 30 | Refills: 0 | Status: DISCONTINUED | COMMUNITY
Start: 2017-05-18 | End: 2017-11-13

## 2017-11-13 RX ORDER — SULFAMETHOXAZOLE AND TRIMETHOPRIM 800; 160 MG/1; MG/1
800-160 TABLET ORAL TWICE DAILY
Qty: 14 | Refills: 0 | Status: DISCONTINUED | COMMUNITY
Start: 2017-04-04 | End: 2017-11-13

## 2017-11-14 ENCOUNTER — FORM ENCOUNTER (OUTPATIENT)
Age: 57
End: 2017-11-14

## 2017-11-15 ENCOUNTER — APPOINTMENT (OUTPATIENT)
Dept: ULTRASOUND IMAGING | Facility: CLINIC | Age: 57
End: 2017-11-15
Payer: MEDICAID

## 2017-11-15 ENCOUNTER — OUTPATIENT (OUTPATIENT)
Dept: OUTPATIENT SERVICES | Facility: HOSPITAL | Age: 57
LOS: 1 days | End: 2017-11-15
Payer: MEDICARE

## 2017-11-15 DIAGNOSIS — Z96.651 PRESENCE OF RIGHT ARTIFICIAL KNEE JOINT: Chronic | ICD-10-CM

## 2017-11-15 DIAGNOSIS — S82.209P UNSPECIFIED FRACTURE OF SHAFT OF UNSPECIFIED TIBIA, SUBSEQUENT ENCOUNTER FOR CLOSED FRACTURE WITH MALUNION: Chronic | ICD-10-CM

## 2017-11-15 DIAGNOSIS — Z00.8 ENCOUNTER FOR OTHER GENERAL EXAMINATION: ICD-10-CM

## 2017-11-15 DIAGNOSIS — Z98.89 OTHER SPECIFIED POSTPROCEDURAL STATES: Chronic | ICD-10-CM

## 2017-11-15 DIAGNOSIS — K46.9 UNSPECIFIED ABDOMINAL HERNIA WITHOUT OBSTRUCTION OR GANGRENE: Chronic | ICD-10-CM

## 2017-11-15 DIAGNOSIS — Z90.49 ACQUIRED ABSENCE OF OTHER SPECIFIED PARTS OF DIGESTIVE TRACT: Chronic | ICD-10-CM

## 2017-11-15 PROCEDURE — 93971 EXTREMITY STUDY: CPT

## 2017-11-15 PROCEDURE — 93971 EXTREMITY STUDY: CPT | Mod: 26

## 2017-11-16 ENCOUNTER — OTHER (OUTPATIENT)
Age: 57
End: 2017-11-16

## 2017-11-20 ENCOUNTER — APPOINTMENT (OUTPATIENT)
Dept: ORTHOPEDIC SURGERY | Facility: CLINIC | Age: 57
End: 2017-11-20
Payer: MEDICAID

## 2017-11-20 VITALS
TEMPERATURE: 98.3 F | WEIGHT: 200 LBS | HEIGHT: 61 IN | SYSTOLIC BLOOD PRESSURE: 119 MMHG | HEART RATE: 89 BPM | DIASTOLIC BLOOD PRESSURE: 76 MMHG | BODY MASS INDEX: 37.76 KG/M2

## 2017-11-20 PROCEDURE — 99024 POSTOP FOLLOW-UP VISIT: CPT

## 2017-11-20 RX ORDER — SULFAMETHOXAZOLE AND TRIMETHOPRIM 800; 160 MG/1; MG/1
800-160 TABLET ORAL TWICE DAILY
Qty: 14 | Refills: 0 | Status: DISCONTINUED | COMMUNITY
Start: 2017-11-13 | End: 2017-11-20

## 2017-11-27 ENCOUNTER — APPOINTMENT (OUTPATIENT)
Dept: ORTHOPEDIC SURGERY | Facility: CLINIC | Age: 57
End: 2017-11-27
Payer: MEDICAID

## 2017-11-27 VITALS
SYSTOLIC BLOOD PRESSURE: 120 MMHG | HEART RATE: 102 BPM | BODY MASS INDEX: 37.76 KG/M2 | HEIGHT: 61 IN | WEIGHT: 200 LBS | DIASTOLIC BLOOD PRESSURE: 72 MMHG

## 2017-11-27 PROCEDURE — 99024 POSTOP FOLLOW-UP VISIT: CPT

## 2017-11-27 RX ORDER — SODIUM SULFATE, POTASSIUM SULFATE, MAGNESIUM SULFATE 17.5; 3.13; 1.6 G/ML; G/ML; G/ML
17.5-3.13-1.6 SOLUTION, CONCENTRATE ORAL
Qty: 354 | Refills: 0 | Status: DISCONTINUED | COMMUNITY
Start: 2017-09-14 | End: 2017-11-27

## 2017-11-27 RX ORDER — OXYCODONE 10 MG/1
10 TABLET ORAL
Qty: 60 | Refills: 0 | Status: DISCONTINUED | COMMUNITY
Start: 2017-11-08 | End: 2017-11-27

## 2017-11-27 RX ORDER — SULFAMETHOXAZOLE AND TRIMETHOPRIM 800; 160 MG/1; MG/1
800-160 TABLET ORAL TWICE DAILY
Qty: 14 | Refills: 0 | Status: DISCONTINUED | COMMUNITY
Start: 2017-11-20 | End: 2017-11-27

## 2017-11-27 RX ORDER — DOCUSATE SODIUM 100 MG/1
TABLET ORAL
Refills: 0 | Status: DISCONTINUED | COMMUNITY
End: 2017-11-27

## 2017-12-06 ENCOUNTER — APPOINTMENT (OUTPATIENT)
Dept: ORTHOPEDIC SURGERY | Facility: CLINIC | Age: 57
End: 2017-12-06

## 2017-12-19 PROCEDURE — 85027 COMPLETE CBC AUTOMATED: CPT

## 2017-12-19 PROCEDURE — C1713: CPT

## 2017-12-19 PROCEDURE — 73560 X-RAY EXAM OF KNEE 1 OR 2: CPT

## 2017-12-19 PROCEDURE — 97116 GAIT TRAINING THERAPY: CPT

## 2017-12-19 PROCEDURE — 88305 TISSUE EXAM BY PATHOLOGIST: CPT

## 2017-12-19 PROCEDURE — C1776: CPT

## 2017-12-19 PROCEDURE — 97110 THERAPEUTIC EXERCISES: CPT

## 2017-12-19 PROCEDURE — 80048 BASIC METABOLIC PNL TOTAL CA: CPT

## 2017-12-19 PROCEDURE — 36415 COLL VENOUS BLD VENIPUNCTURE: CPT

## 2017-12-19 PROCEDURE — 88311 DECALCIFY TISSUE: CPT

## 2017-12-19 PROCEDURE — 97530 THERAPEUTIC ACTIVITIES: CPT

## 2018-03-19 ENCOUNTER — APPOINTMENT (OUTPATIENT)
Dept: MAMMOGRAPHY | Facility: CLINIC | Age: 58
End: 2018-03-19

## 2018-03-19 ENCOUNTER — OUTPATIENT (OUTPATIENT)
Dept: OUTPATIENT SERVICES | Facility: HOSPITAL | Age: 58
LOS: 1 days | End: 2018-03-19
Payer: MEDICARE

## 2018-03-19 DIAGNOSIS — Z12.39 ENCOUNTER FOR OTHER SCREENING FOR MALIGNANT NEOPLASM OF BREAST: ICD-10-CM

## 2018-03-19 DIAGNOSIS — Z98.89 OTHER SPECIFIED POSTPROCEDURAL STATES: Chronic | ICD-10-CM

## 2018-03-19 DIAGNOSIS — S82.209P UNSPECIFIED FRACTURE OF SHAFT OF UNSPECIFIED TIBIA, SUBSEQUENT ENCOUNTER FOR CLOSED FRACTURE WITH MALUNION: Chronic | ICD-10-CM

## 2018-03-19 DIAGNOSIS — Z90.49 ACQUIRED ABSENCE OF OTHER SPECIFIED PARTS OF DIGESTIVE TRACT: Chronic | ICD-10-CM

## 2018-03-19 DIAGNOSIS — Z96.651 PRESENCE OF RIGHT ARTIFICIAL KNEE JOINT: Chronic | ICD-10-CM

## 2018-03-19 DIAGNOSIS — K46.9 UNSPECIFIED ABDOMINAL HERNIA WITHOUT OBSTRUCTION OR GANGRENE: Chronic | ICD-10-CM

## 2018-03-19 PROCEDURE — 77063 BREAST TOMOSYNTHESIS BI: CPT

## 2018-03-19 PROCEDURE — 77067 SCR MAMMO BI INCL CAD: CPT

## 2018-03-19 PROCEDURE — 77067 SCR MAMMO BI INCL CAD: CPT | Mod: 26

## 2018-03-19 PROCEDURE — 77063 BREAST TOMOSYNTHESIS BI: CPT | Mod: 26

## 2018-06-04 ENCOUNTER — OTHER (OUTPATIENT)
Age: 58
End: 2018-06-04

## 2018-06-11 ENCOUNTER — RESULT REVIEW (OUTPATIENT)
Age: 58
End: 2018-06-11

## 2018-06-14 ENCOUNTER — APPOINTMENT (OUTPATIENT)
Dept: ORTHOPEDIC SURGERY | Facility: CLINIC | Age: 58
End: 2018-06-14

## 2018-07-12 ENCOUNTER — APPOINTMENT (OUTPATIENT)
Dept: MATERNAL FETAL MEDICINE | Facility: CLINIC | Age: 58
End: 2018-07-12
Payer: MEDICARE

## 2018-07-12 PROCEDURE — 99203 OFFICE O/P NEW LOW 30 MIN: CPT

## 2018-08-02 ENCOUNTER — RESULT REVIEW (OUTPATIENT)
Age: 58
End: 2018-08-02

## 2018-08-03 ENCOUNTER — OTHER (OUTPATIENT)
Age: 58
End: 2018-08-03

## 2018-11-15 PROBLEM — G62.9 POLYNEUROPATHY, UNSPECIFIED: Chronic | Status: ACTIVE | Noted: 2017-10-06

## 2018-11-15 PROBLEM — E78.5 HYPERLIPIDEMIA, UNSPECIFIED: Chronic | Status: ACTIVE | Noted: 2017-02-15

## 2018-12-04 ENCOUNTER — APPOINTMENT (OUTPATIENT)
Dept: ORTHOPEDIC SURGERY | Facility: CLINIC | Age: 58
End: 2018-12-04
Payer: MEDICARE

## 2018-12-04 VITALS
HEIGHT: 61 IN | WEIGHT: 190 LBS | BODY MASS INDEX: 35.87 KG/M2 | HEART RATE: 62 BPM | DIASTOLIC BLOOD PRESSURE: 81 MMHG | SYSTOLIC BLOOD PRESSURE: 130 MMHG

## 2018-12-04 PROCEDURE — 99214 OFFICE O/P EST MOD 30 MIN: CPT

## 2018-12-04 PROCEDURE — 73110 X-RAY EXAM OF WRIST: CPT | Mod: LT

## 2018-12-04 RX ORDER — ASPIRIN 325 MG/1
325 TABLET, FILM COATED ORAL
Refills: 0 | Status: DISCONTINUED | COMMUNITY
End: 2018-12-04

## 2018-12-12 ENCOUNTER — OTHER (OUTPATIENT)
Age: 58
End: 2018-12-12

## 2019-04-18 ENCOUNTER — TRANSCRIPTION ENCOUNTER (OUTPATIENT)
Age: 59
End: 2019-04-18

## 2019-04-24 ENCOUNTER — FORM ENCOUNTER (OUTPATIENT)
Age: 59
End: 2019-04-24

## 2019-04-25 ENCOUNTER — OUTPATIENT (OUTPATIENT)
Dept: OUTPATIENT SERVICES | Facility: HOSPITAL | Age: 59
LOS: 1 days | End: 2019-04-25
Payer: MEDICARE

## 2019-04-25 ENCOUNTER — APPOINTMENT (OUTPATIENT)
Dept: MAMMOGRAPHY | Facility: CLINIC | Age: 59
End: 2019-04-25
Payer: MEDICARE

## 2019-04-25 DIAGNOSIS — Z98.89 OTHER SPECIFIED POSTPROCEDURAL STATES: Chronic | ICD-10-CM

## 2019-04-25 DIAGNOSIS — Z12.31 ENCOUNTER FOR SCREENING MAMMOGRAM FOR MALIGNANT NEOPLASM OF BREAST: ICD-10-CM

## 2019-04-25 DIAGNOSIS — Z96.651 PRESENCE OF RIGHT ARTIFICIAL KNEE JOINT: Chronic | ICD-10-CM

## 2019-04-25 DIAGNOSIS — Z90.49 ACQUIRED ABSENCE OF OTHER SPECIFIED PARTS OF DIGESTIVE TRACT: Chronic | ICD-10-CM

## 2019-04-25 DIAGNOSIS — S82.209P UNSPECIFIED FRACTURE OF SHAFT OF UNSPECIFIED TIBIA, SUBSEQUENT ENCOUNTER FOR CLOSED FRACTURE WITH MALUNION: Chronic | ICD-10-CM

## 2019-04-25 DIAGNOSIS — K46.9 UNSPECIFIED ABDOMINAL HERNIA WITHOUT OBSTRUCTION OR GANGRENE: Chronic | ICD-10-CM

## 2019-04-25 PROCEDURE — 76641 ULTRASOUND BREAST COMPLETE: CPT | Mod: 26,50

## 2019-04-25 PROCEDURE — 77067 SCR MAMMO BI INCL CAD: CPT

## 2019-04-25 PROCEDURE — 77063 BREAST TOMOSYNTHESIS BI: CPT

## 2019-04-25 PROCEDURE — 76641 ULTRASOUND BREAST COMPLETE: CPT

## 2019-04-25 PROCEDURE — 77067 SCR MAMMO BI INCL CAD: CPT | Mod: 26

## 2019-04-25 PROCEDURE — 77063 BREAST TOMOSYNTHESIS BI: CPT | Mod: 26

## 2019-09-15 ENCOUNTER — FORM ENCOUNTER (OUTPATIENT)
Age: 59
End: 2019-09-15

## 2019-09-16 ENCOUNTER — APPOINTMENT (OUTPATIENT)
Dept: RADIOLOGY | Facility: CLINIC | Age: 59
End: 2019-09-16
Payer: MEDICARE

## 2019-09-16 ENCOUNTER — OUTPATIENT (OUTPATIENT)
Dept: OUTPATIENT SERVICES | Facility: HOSPITAL | Age: 59
LOS: 1 days | End: 2019-09-16
Payer: MEDICARE

## 2019-09-16 DIAGNOSIS — K46.9 UNSPECIFIED ABDOMINAL HERNIA WITHOUT OBSTRUCTION OR GANGRENE: Chronic | ICD-10-CM

## 2019-09-16 DIAGNOSIS — Z96.651 PRESENCE OF RIGHT ARTIFICIAL KNEE JOINT: Chronic | ICD-10-CM

## 2019-09-16 DIAGNOSIS — Z98.89 OTHER SPECIFIED POSTPROCEDURAL STATES: Chronic | ICD-10-CM

## 2019-09-16 DIAGNOSIS — S82.209P UNSPECIFIED FRACTURE OF SHAFT OF UNSPECIFIED TIBIA, SUBSEQUENT ENCOUNTER FOR CLOSED FRACTURE WITH MALUNION: Chronic | ICD-10-CM

## 2019-09-16 DIAGNOSIS — Z00.00 ENCOUNTER FOR GENERAL ADULT MEDICAL EXAMINATION WITHOUT ABNORMAL FINDINGS: ICD-10-CM

## 2019-09-16 DIAGNOSIS — Z90.49 ACQUIRED ABSENCE OF OTHER SPECIFIED PARTS OF DIGESTIVE TRACT: Chronic | ICD-10-CM

## 2019-09-16 PROCEDURE — 77080 DXA BONE DENSITY AXIAL: CPT

## 2019-09-16 PROCEDURE — 77080 DXA BONE DENSITY AXIAL: CPT | Mod: 26

## 2019-10-07 ENCOUNTER — RECORD ABSTRACTING (OUTPATIENT)
Age: 59
End: 2019-10-07

## 2019-10-07 DIAGNOSIS — Z92.89 PERSONAL HISTORY OF OTHER MEDICAL TREATMENT: ICD-10-CM

## 2019-10-07 LAB — CYTOLOGY CVX/VAG DOC THIN PREP: NORMAL

## 2019-11-14 NOTE — PHYSICAL THERAPY INITIAL EVALUATION ADULT - THERAPY FREQUENCY, PT EVAL
[de-identified] : CML- chronic phase. \par Diagnosed 2008.  Started on imatinib developed CHR, stopped due to neutropenia.  Then started on dasatinib with a MMR, stopped in 11/2011 due to ?colonic ulcers and BRBPR.  Attempted treatment with nilotinib in 1/2012 but stopped soon after? due to hair thinning.  Since then has been on imatinib. \par \par \par  [de-identified] : Patient presents for a follow up today.  She reports is doing well, she states she is compliant with Gleevec, missed about 3-4 doses?  Again discussed her compliance with the previous 3 months which she disclosed she had missed a week or two. Continues to take Zofran 2 mg for nausea related to the imatinib. She reports that her hair thinning.  Denies fatigue, fever/chills, no chest pain, no SOB, no abdominal pain, no nausea, vomiting, diarrhea.  Tearful about her disease and needing to take medications.  5-7x/week

## 2019-11-27 ENCOUNTER — APPOINTMENT (OUTPATIENT)
Dept: OBGYN | Facility: CLINIC | Age: 59
End: 2019-11-27

## 2019-11-27 DIAGNOSIS — Z96.659 PRESENCE OF UNSPECIFIED ARTIFICIAL KNEE JOINT: ICD-10-CM

## 2019-11-27 DIAGNOSIS — M47.812 SPONDYLOSIS W/OUT MYELOPATHY OR RADICULOPATHY, CERVICAL REGION: ICD-10-CM

## 2019-11-27 DIAGNOSIS — M25.569 PAIN IN UNSPECIFIED KNEE: ICD-10-CM

## 2019-11-27 DIAGNOSIS — M25.561 PAIN IN RIGHT KNEE: ICD-10-CM

## 2019-11-27 DIAGNOSIS — M75.51 BURSITIS OF RIGHT SHOULDER: ICD-10-CM

## 2019-11-27 DIAGNOSIS — Z96.652 PRESENCE OF LEFT ARTIFICIAL KNEE JOINT: ICD-10-CM

## 2019-11-27 DIAGNOSIS — S82.141D DISPLACED BICONDYLAR FRACTURE OF RIGHT TIBIA, SUBSEQUENT ENCOUNTER FOR CLOSED FRACTURE WITH ROUTINE HEALING: ICD-10-CM

## 2019-11-27 DIAGNOSIS — Z96.651 AFTERCARE FOLLOWING JOINT REPLACEMENT SURGERY: ICD-10-CM

## 2019-11-27 DIAGNOSIS — Z47.1 AFTERCARE FOLLOWING JOINT REPLACEMENT SURGERY: ICD-10-CM

## 2019-11-27 DIAGNOSIS — N95.1 MENOPAUSAL AND FEMALE CLIMACTERIC STATES: ICD-10-CM

## 2019-11-27 DIAGNOSIS — Z96.652 AFTERCARE FOLLOWING JOINT REPLACEMENT SURGERY: ICD-10-CM

## 2019-11-27 DIAGNOSIS — S52.532P COLLES' FRACTURE OF LEFT RADIUS, SUBSEQUENT ENCOUNTER FOR CLOSED FRACTURE WITH MALUNION: ICD-10-CM

## 2019-12-31 ENCOUNTER — APPOINTMENT (OUTPATIENT)
Dept: OBGYN | Facility: CLINIC | Age: 59
End: 2019-12-31

## 2020-01-13 ENCOUNTER — APPOINTMENT (OUTPATIENT)
Dept: OBGYN | Facility: CLINIC | Age: 60
End: 2020-01-13
Payer: MEDICARE

## 2020-01-13 VITALS
SYSTOLIC BLOOD PRESSURE: 122 MMHG | BODY MASS INDEX: 38.71 KG/M2 | HEIGHT: 61 IN | WEIGHT: 205 LBS | DIASTOLIC BLOOD PRESSURE: 74 MMHG

## 2020-01-13 DIAGNOSIS — R31.29 OTHER MICROSCOPIC HEMATURIA: ICD-10-CM

## 2020-01-13 DIAGNOSIS — Z87.39 PERSONAL HISTORY OF OTHER DISEASES OF THE MUSCULOSKELETAL SYSTEM AND CONNECTIVE TISSUE: ICD-10-CM

## 2020-01-13 DIAGNOSIS — R31.21 ASYMPTOMATIC MICROSCOPIC HEMATURIA: ICD-10-CM

## 2020-01-13 DIAGNOSIS — N39.3 STRESS INCONTINENCE (FEMALE) (MALE): ICD-10-CM

## 2020-01-13 DIAGNOSIS — Z63.4 DISAPPEARANCE AND DEATH OF FAMILY MEMBER: ICD-10-CM

## 2020-01-13 DIAGNOSIS — Z01.419 ENCOUNTER FOR GYNECOLOGICAL EXAMINATION (GENERAL) (ROUTINE) W/OUT ABNORMAL FINDINGS: ICD-10-CM

## 2020-01-13 DIAGNOSIS — Z80.7 FAMILY HISTORY OF OTHER MALIGNANT NEOPLASMS OF LYMPHOID, HEMATOPOIETIC AND RELATED TISSUES: ICD-10-CM

## 2020-01-13 DIAGNOSIS — N60.01 SOLITARY CYST OF RIGHT BREAST: ICD-10-CM

## 2020-01-13 LAB
BILIRUB UR QL STRIP: NORMAL
DATE COLLECTED: NORMAL
GLUCOSE UR-MCNC: NORMAL
HCG UR QL: 0.2 EU/DL
HEMOCCULT SP1 STL QL: NEGATIVE
HGB UR QL STRIP.AUTO: NORMAL
KETONES UR-MCNC: NORMAL
LEUKOCYTE ESTERASE UR QL STRIP: NORMAL
NITRITE UR QL STRIP: NORMAL
PH UR STRIP: 5
PROT UR STRIP-MCNC: NORMAL
QUALITY CONTROL: YES
SP GR UR STRIP: 1.02

## 2020-01-13 PROCEDURE — G0101: CPT | Mod: GA

## 2020-01-13 PROCEDURE — 81003 URINALYSIS AUTO W/O SCOPE: CPT | Mod: QW

## 2020-01-13 PROCEDURE — 82270 OCCULT BLOOD FECES: CPT

## 2020-01-13 SDOH — SOCIAL STABILITY - SOCIAL INSECURITY: DISSAPEARANCE AND DEATH OF FAMILY MEMBER: Z63.4

## 2020-01-13 NOTE — HISTORY OF PRESENT ILLNESS
[Good] : being in good health [Healthy Diet] : a healthy diet [Last Pap Smear ___] : last Papanicolaou cytology done [unfilled] [Last HPV Screen ___] : last human papilloma virus screening done [unfilled] [Last Mammogram ___] : last mammogram done [unfilled] [Last Colonoscopy ___] : last colonoscopy done [unfilled] [Performed: ___] : DEXA performed [unfilled] [Postmenopausal] : is postmenopausal [Menarche Age: ____] : age at menarche was [unfilled] [unknown] : the patient is unsure of the date of her LMP [Sexually Active] : is sexually active [Male ___] : [unfilled] male [Currently In Menopause] : currently in menopause [Regular Exercise] : not exercising regularly [de-identified] : Breast ultrasound 4/25/2019 BI-RADS 2 [Burning] : no burning [Itching] : no itching [Mass] : no mass [Stinging] : no stinging [Lesion] : no lesion [Soreness] : no soreness [Discharge] : no discharge [Localized Pain] : no localized pain [Mass (___cm)] : no palpable mass [Diffused Pain] : no diffused pain [Nipple Discharge] : no nipple discharge [Skin Color Change] : no skin color change [Hot Flashes] : no hot flashes [Night Sweats] : no night sweats [Vaginal Itching] : no vaginal itching [Dyspareunia] : no dyspareunia [Mood Changes] : no mood changes

## 2020-01-13 NOTE — PHYSICAL EXAM
[Alert] : alert [Awake] : awake [Soft] : soft [Oriented x3] : oriented to person, place, and time [Vulvar Atrophy] : vulvar atrophy [Labia Majora] : labia major [Normal] : clitoris [Labia Minora] : labia minora [Atrophy] : atrophy [No Bleeding] : there was no active vaginal bleeding [Normal Position] : in a normal position [No Tenderness] : no rectal tenderness [Uterine Adnexae] : were not tender and not enlarged [Nl Sphincter Tone] : normal sphincter tone [RRR, No Murmurs] : RRR, no murmurs [CTAB] : CTAB [Acute Distress] : no acute distress [LAD] : no lymphadenopathy [Thyroid Nodule] : no thyroid nodule [Goiter] : no goiter [Mass] : no breast mass [Nipple Discharge] : no nipple discharge [Axillary LAD] : no axillary lymphadenopathy [Tender] : non tender [Tenderness] : nontender [Enlarged ___ wks] : not enlarged [Mass ___ cm] : no uterine mass was palpated [Occult Blood] : occult blood test from digital rectal exam was negative

## 2020-01-13 NOTE — DISCUSSION/SUMMARY
[FreeTextEntry1] : 59 year old postmenopausal  2 para 2005 is here for an annual exam. Her Pap smear from 2018 was negative and no oncogenic human papilloma virus was detected. \par  \par Her mother had ovarian/primary peritoneal cancer.  She denies early satiety, nausea, vomiting or changes in bowel, bladder habits. She was sent for a pelvic sonogram last year, the limitations for diagnosis of a malignancy, were explained. She returned on 2018 and the results were reviewed. The endometrium measured 1.6 mm, the right ovary appeared normal in size, shape and consistency. The left ovary was not well seen and there was no free fluid. Ms. CASTRO never went to the original genetics appointment because she broke her tibia en route to that appointment but she reported that she was scheduled to follow up. Her BRCA test, ordered by Dr. Conner on 2018, was NEGATIVE.  Due to the evasive nature of the disease and her personal history of colon cancer, we will check a pelvic ultrasound.\par  \par MARC has a history of microscopic hematuria and saw Urology.  She has small blood in the urine today and smokes "once in a blue moon."  She was evaluated and her workup was reportedly negative.  Ms. CASTRO loses urine with coughing and sneezing and we will send a formal analysis and culture.\par  \par Ms. CASTRO has seen Dr. Natalia Wood in the past as she was being followed every six months with imaging. She had a benign left breast biopsy on 3/22/2016 after a left diagnostic mammogram from 3/15/2016 was BI-RADS 4. The pathology revealed "breast tissue with focal hyperplastic and columnar cell change with focal mild cellular atypia. Calcifications associated with hyperplastic duct." The samples without calcifications demonstrated, "benign, mostly fibroadipose breast tissue." Ultimately she was told she did not need to return to the specialist any longer. \par \par On her most recent imaging from 2019, the mammogram was BI-RADS 2 and revealed a stable, circumscribed mass in the outer right breast and stable postoperative changes in the left. The sonogram on the right showed "no suspicious solid masses." There was a stable, 0.7 x 0.7 x 0.4 cm circumscribed, hypoechoic mass at 9:00, 9 cm from the nipple and a stable, 0.4 x 0.2 x 0.4 cm circumscribed, hypoechoic mass at 9-10:00, 9 cm from the nipple. On inspection, there were no skin color changes, dimpling or retraction seen. No masses or axillary lymphadenopathy were palpated and no nipple discharge was expressible. Continued monthly self breast examination was advised and a referral for mammogram with ultrasound 2020 was provided. \par  \par She has osteopenia. The importance of weight bearing exercise and adequate daily calcium with vitamin D3 to slow the progression of bone loss was underscored.  Complete smoking cessation was recommended. \par  \par All of her concerns were addressed, questions answered and reassurance was given.

## 2020-01-13 NOTE — DISCUSSION/SUMMARY
[FreeTextEntry1] : 59 year old postmenopausal  2 para 2005 is here for an annual exam. Her Pap smear from 2018 was negative and no oncogenic human papilloma virus was detected. \par  \par Her mother had ovarian/primary peritoneal cancer.  She denies early satiety, nausea, vomiting or changes in bowel, bladder habits. She was sent for a pelvic sonogram last year, the limitations for diagnosis of a malignancy, were explained. She returned on 2018 and the results were reviewed. The endometrium measured 1.6 mm, the right ovary appeared normal in size, shape and consistency. The left ovary was not well seen and there was no free fluid. Ms. CASTRO never went to the original genetics appointment because she broke her tibia en route to that appointment but she reported that she was scheduled to follow up. Her BRCA test, ordered by Dr. Connre on 2018, was NEGATIVE.  Due to the evasive nature of the disease and her personal history of colon cancer, we will check a pelvic ultrasound.\par  \par MARC has a history of microscopic hematuria and saw Urology.  She has small blood in the urine today and smokes "once in a blue moon."  She was evaluated and her workup was reportedly negative.  Ms. CASTRO loses urine with coughing and sneezing and we will send a formal analysis and culture.\par  \par Ms. CASTRO has seen Dr. Natalia Wood in the past as she was being followed every six months with imaging. She had a benign left breast biopsy on 3/22/2016 after a left diagnostic mammogram from 3/15/2016 was BI-RADS 4. The pathology revealed "breast tissue with focal hyperplastic and columnar cell change with focal mild cellular atypia. Calcifications associated with hyperplastic duct." The samples without calcifications demonstrated, "benign, mostly fibroadipose breast tissue." Ultimately she was told she did not need to return to the specialist any longer. \par \par On her most recent imaging from 2019, the mammogram was BI-RADS 2 and revealed a stable, circumscribed mass in the outer right breast and stable postoperative changes in the left. The sonogram on the right showed "no suspicious solid masses." There was a stable, 0.7 x 0.7 x 0.4 cm circumscribed, hypoechoic mass at 9:00, 9 cm from the nipple and a stable, 0.4 x 0.2 x 0.4 cm circumscribed, hypoechoic mass at 9-10:00, 9 cm from the nipple. On inspection, there were no skin color changes, dimpling or retraction seen. No masses or axillary lymphadenopathy were palpated and no nipple discharge was expressible. Continued monthly self breast examination was advised and a referral for mammogram with ultrasound 2020 was provided. \par  \par She has osteopenia. The importance of weight bearing exercise and adequate daily calcium with vitamin D3 to slow the progression of bone loss was underscored.  Complete smoking cessation was recommended. \par  \par All of her concerns were addressed, questions answered and reassurance was given.

## 2020-01-13 NOTE — PHYSICAL EXAM
[Awake] : awake [Alert] : alert [Soft] : soft [Oriented x3] : oriented to person, place, and time [Vulvar Atrophy] : vulvar atrophy [Normal] : clitoris [Labia Minora] : labia minora [Labia Majora] : labia major [Atrophy] : atrophy [Normal Position] : in a normal position [No Bleeding] : there was no active vaginal bleeding [Uterine Adnexae] : were not tender and not enlarged [No Tenderness] : no rectal tenderness [RRR, No Murmurs] : RRR, no murmurs [Nl Sphincter Tone] : normal sphincter tone [CTAB] : CTAB [Acute Distress] : no acute distress [LAD] : no lymphadenopathy [Thyroid Nodule] : no thyroid nodule [Goiter] : no goiter [Mass] : no breast mass [Nipple Discharge] : no nipple discharge [Axillary LAD] : no axillary lymphadenopathy [Tender] : non tender [Tenderness] : nontender [Enlarged ___ wks] : not enlarged [Mass ___ cm] : no uterine mass was palpated [Occult Blood] : occult blood test from digital rectal exam was negative

## 2020-01-13 NOTE — HISTORY OF PRESENT ILLNESS
[Good] : being in good health [Healthy Diet] : a healthy diet [Last Pap Smear ___] : last Papanicolaou cytology done [unfilled] [Last HPV Screen ___] : last human papilloma virus screening done [unfilled] [Last Mammogram ___] : last mammogram done [unfilled] [Last Colonoscopy ___] : last colonoscopy done [unfilled] [Performed: ___] : DEXA performed [unfilled] [Postmenopausal] : is postmenopausal [Menarche Age: ____] : age at menarche was [unfilled] [unknown] : the patient is unsure of the date of her LMP [Sexually Active] : is sexually active [Male ___] : [unfilled] male [Currently In Menopause] : currently in menopause [Regular Exercise] : not exercising regularly [de-identified] : Breast ultrasound 4/25/2019 BI-RADS 2 [Burning] : no burning [Itching] : no itching [Mass] : no mass [Stinging] : no stinging [Lesion] : no lesion [Soreness] : no soreness [Discharge] : no discharge [Localized Pain] : no localized pain [Mass (___cm)] : no palpable mass [Diffused Pain] : no diffused pain [Nipple Discharge] : no nipple discharge [Skin Color Change] : no skin color change [Hot Flashes] : no hot flashes [Night Sweats] : no night sweats [Vaginal Itching] : no vaginal itching [Dyspareunia] : no dyspareunia [Mood Changes] : no mood changes

## 2020-01-14 LAB
APPEARANCE: CLEAR
BACTERIA: NEGATIVE
BILIRUBIN URINE: NEGATIVE
BLOOD URINE: NORMAL
COLOR: NORMAL
GLUCOSE QUALITATIVE U: NEGATIVE
HPV HIGH+LOW RISK DNA PNL CVX: NOT DETECTED
HYALINE CASTS: 1 /LPF
KETONES URINE: NEGATIVE
LEUKOCYTE ESTERASE URINE: NEGATIVE
MICROSCOPIC-UA: NORMAL
NITRITE URINE: NEGATIVE
PH URINE: 5.5
PROTEIN URINE: NEGATIVE
RED BLOOD CELLS URINE: 2 /HPF
SPECIFIC GRAVITY URINE: 1.02
SQUAMOUS EPITHELIAL CELLS: 1 /HPF
UROBILINOGEN URINE: NORMAL
WHITE BLOOD CELLS URINE: 1 /HPF

## 2020-01-15 ENCOUNTER — FORM ENCOUNTER (OUTPATIENT)
Age: 60
End: 2020-01-15

## 2020-01-15 LAB — BACTERIA UR CULT: NORMAL

## 2020-01-16 ENCOUNTER — APPOINTMENT (OUTPATIENT)
Dept: ULTRASOUND IMAGING | Facility: CLINIC | Age: 60
End: 2020-01-16
Payer: MEDICARE

## 2020-01-16 ENCOUNTER — OUTPATIENT (OUTPATIENT)
Dept: OUTPATIENT SERVICES | Facility: HOSPITAL | Age: 60
LOS: 1 days | End: 2020-01-16
Payer: MEDICARE

## 2020-01-16 DIAGNOSIS — Z98.89 OTHER SPECIFIED POSTPROCEDURAL STATES: Chronic | ICD-10-CM

## 2020-01-16 DIAGNOSIS — S82.209P UNSPECIFIED FRACTURE OF SHAFT OF UNSPECIFIED TIBIA, SUBSEQUENT ENCOUNTER FOR CLOSED FRACTURE WITH MALUNION: Chronic | ICD-10-CM

## 2020-01-16 DIAGNOSIS — Z96.651 PRESENCE OF RIGHT ARTIFICIAL KNEE JOINT: Chronic | ICD-10-CM

## 2020-01-16 DIAGNOSIS — K46.9 UNSPECIFIED ABDOMINAL HERNIA WITHOUT OBSTRUCTION OR GANGRENE: Chronic | ICD-10-CM

## 2020-01-16 DIAGNOSIS — Z90.49 ACQUIRED ABSENCE OF OTHER SPECIFIED PARTS OF DIGESTIVE TRACT: Chronic | ICD-10-CM

## 2020-01-16 DIAGNOSIS — Z00.00 ENCOUNTER FOR GENERAL ADULT MEDICAL EXAMINATION WITHOUT ABNORMAL FINDINGS: ICD-10-CM

## 2020-01-16 DIAGNOSIS — F40.298 OTHER SPECIFIED PHOBIA: ICD-10-CM

## 2020-01-16 PROCEDURE — 76830 TRANSVAGINAL US NON-OB: CPT | Mod: 26

## 2020-01-16 PROCEDURE — 76830 TRANSVAGINAL US NON-OB: CPT

## 2020-01-27 ENCOUNTER — TRANSCRIPTION ENCOUNTER (OUTPATIENT)
Age: 60
End: 2020-01-27

## 2020-04-27 ENCOUNTER — RESULT REVIEW (OUTPATIENT)
Age: 60
End: 2020-04-27

## 2020-04-27 ENCOUNTER — APPOINTMENT (OUTPATIENT)
Dept: ULTRASOUND IMAGING | Facility: CLINIC | Age: 60
End: 2020-04-27
Payer: MEDICARE

## 2020-04-27 ENCOUNTER — OUTPATIENT (OUTPATIENT)
Dept: OUTPATIENT SERVICES | Facility: HOSPITAL | Age: 60
LOS: 1 days | End: 2020-04-27
Payer: MEDICARE

## 2020-04-27 ENCOUNTER — APPOINTMENT (OUTPATIENT)
Dept: MAMMOGRAPHY | Facility: CLINIC | Age: 60
End: 2020-04-27
Payer: MEDICARE

## 2020-04-27 DIAGNOSIS — Z98.89 OTHER SPECIFIED POSTPROCEDURAL STATES: Chronic | ICD-10-CM

## 2020-04-27 DIAGNOSIS — S82.209P UNSPECIFIED FRACTURE OF SHAFT OF UNSPECIFIED TIBIA, SUBSEQUENT ENCOUNTER FOR CLOSED FRACTURE WITH MALUNION: Chronic | ICD-10-CM

## 2020-04-27 DIAGNOSIS — Z96.651 PRESENCE OF RIGHT ARTIFICIAL KNEE JOINT: Chronic | ICD-10-CM

## 2020-04-27 DIAGNOSIS — N60.01 SOLITARY CYST OF RIGHT BREAST: ICD-10-CM

## 2020-04-27 DIAGNOSIS — Z00.00 ENCOUNTER FOR GENERAL ADULT MEDICAL EXAMINATION WITHOUT ABNORMAL FINDINGS: ICD-10-CM

## 2020-04-27 DIAGNOSIS — Z90.49 ACQUIRED ABSENCE OF OTHER SPECIFIED PARTS OF DIGESTIVE TRACT: Chronic | ICD-10-CM

## 2020-04-27 DIAGNOSIS — K46.9 UNSPECIFIED ABDOMINAL HERNIA WITHOUT OBSTRUCTION OR GANGRENE: Chronic | ICD-10-CM

## 2020-04-27 DIAGNOSIS — R92.8 OTHER ABNORMAL AND INCONCLUSIVE FINDINGS ON DIAGNOSTIC IMAGING OF BREAST: ICD-10-CM

## 2020-04-27 PROCEDURE — 76641 ULTRASOUND BREAST COMPLETE: CPT | Mod: 26,50

## 2020-04-27 PROCEDURE — 77063 BREAST TOMOSYNTHESIS BI: CPT | Mod: 26

## 2020-04-27 PROCEDURE — 76830 TRANSVAGINAL US NON-OB: CPT | Mod: 26

## 2020-04-27 PROCEDURE — 77063 BREAST TOMOSYNTHESIS BI: CPT

## 2020-04-27 PROCEDURE — 76641 ULTRASOUND BREAST COMPLETE: CPT

## 2020-04-27 PROCEDURE — 76856 US EXAM PELVIC COMPLETE: CPT

## 2020-04-27 PROCEDURE — 77067 SCR MAMMO BI INCL CAD: CPT | Mod: 26

## 2020-04-27 PROCEDURE — 76856 US EXAM PELVIC COMPLETE: CPT | Mod: 26

## 2020-04-27 PROCEDURE — 77067 SCR MAMMO BI INCL CAD: CPT

## 2020-04-27 PROCEDURE — 76830 TRANSVAGINAL US NON-OB: CPT

## 2020-05-08 ENCOUNTER — APPOINTMENT (OUTPATIENT)
Dept: OBGYN | Facility: CLINIC | Age: 60
End: 2020-05-08
Payer: MEDICARE

## 2020-05-08 VITALS
HEIGHT: 61 IN | TEMPERATURE: 98 F | BODY MASS INDEX: 39.65 KG/M2 | SYSTOLIC BLOOD PRESSURE: 110 MMHG | DIASTOLIC BLOOD PRESSURE: 76 MMHG | WEIGHT: 210 LBS

## 2020-05-08 DIAGNOSIS — F40.298 OTHER SPECIFIED PHOBIA: ICD-10-CM

## 2020-05-08 PROCEDURE — 99214 OFFICE O/P EST MOD 30 MIN: CPT

## 2020-05-08 RX ORDER — ERGOCALCIFEROL 1.25 MG/1
1.25 MG CAPSULE, LIQUID FILLED ORAL
Qty: 12 | Refills: 0 | Status: ACTIVE | COMMUNITY
Start: 2020-02-24

## 2020-05-19 PROBLEM — F40.298 CANCER PHOBIA: Status: ACTIVE | Noted: 2020-01-13

## 2020-05-19 NOTE — DISCUSSION/SUMMARY
[FreeTextEntry1] : 60 year old postmenopausal  2 para 2005 is here for a discussion of results.\par \par Her mother had ovarian/primary peritoneal cancer. She continues to deny early satiety, nausea, vomiting or changes in bowel, bladder habits. She was sent for a pelvic sonogram which was performed on 2020.  The uterus measured 7.3 x 2.8 x 4.6 cm and the endometrium was 3 mm.  The right ovary appeared normal in size, shape and consistency. 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  There was no free fluid.  The significance, implications, limitations of the findings in  regard to diagnosis of cancer were explained.\par \par Her BRCA test, ordered by Dr. Conner on 2018, was NEGATIVE. Due to the evasive nature of the disease and her personal history of colon cancer, we will continue to check a pelvic ultrasound or sooner as needed. Call parameters were outlined and she verbalized good understanding.\par \par We also reviewed the mammogram which showed scattered areas of fibroglandular density.  "No suspicious mass, suspicious microcalcifications, or other sign of malignancy is identified.  There is a stable circumscribed mass in the outer right breast.  There are stable postoperative changes in the left breast."  Her breast sonogram revealed a "heterogeneous background echotexture (mixed fatty and fibroglandular) on the right.  There was no "suspicious mass.  At 9:00, 9 cm from the nipple.stable 7 x 4 x 6 mm mass.  At 9-10:00, 9 cm from the nipple, stable 3 x 2 x 3 mm mass."\par \par The duration of face-to-face spent reviewing the results, addressing all of her concerns, answering all of her questions and offering reassurance was 25 minutes.

## 2020-05-19 NOTE — HISTORY OF PRESENT ILLNESS
[___ Month(s) Ago] : [unfilled] month(s) ago [Good] : being in good health [Last Pap Smear ___] : last Papanicolaou cytology done [unfilled] [Last Mammogram ___] : last mammogram done [unfilled] [Postmenopausal] : is postmenopausal [unknown] : the patient is unsure of the date of her LMP [Currently In Menopause] : currently in menopause [Menarche Age: ____] : age at menarche was [unfilled] [Sexually Active] : is sexually active [Male ___] : [unfilled] male [de-identified] : Breast ultrasound 4/27/2020 [Burning] : no burning [Mass] : no mass [Itching] : no itching [Stinging] : no stinging [Lesion] : no lesion [Soreness] : no soreness [Discharge] : no discharge [Localized Pain] : no localized pain [Mass (___cm)] : no palpable mass [Diffused Pain] : no diffused pain [Nipple Discharge] : no nipple discharge [Skin Color Change] : no skin color change

## 2020-12-23 PROBLEM — Z01.419 ENCOUNTER FOR ANNUAL ROUTINE GYNECOLOGICAL EXAMINATION: Status: RESOLVED | Noted: 2020-01-13 | Resolved: 2020-12-23

## 2021-01-26 ENCOUNTER — APPOINTMENT (OUTPATIENT)
Dept: OBGYN | Facility: CLINIC | Age: 61
End: 2021-01-26
Payer: MEDICARE

## 2021-01-26 VITALS
BODY MASS INDEX: 28.32 KG/M2 | HEIGHT: 61 IN | SYSTOLIC BLOOD PRESSURE: 110 MMHG | WEIGHT: 150 LBS | DIASTOLIC BLOOD PRESSURE: 64 MMHG

## 2021-01-26 PROCEDURE — 99213 OFFICE O/P EST LOW 20 MIN: CPT

## 2021-01-26 RX ORDER — PRAVASTATIN SODIUM 20 MG/1
20 TABLET ORAL
Refills: 0 | Status: COMPLETED | COMMUNITY
End: 2021-01-26

## 2021-01-26 NOTE — DISCUSSION/SUMMARY
[FreeTextEntry1] : Health Maintenance:\par -Pap with HPV next year\par -Mammo with breast US Rx\par -TBSE\par -colonoscopy guidelines reviewed with patient. guiac neg\par -Achieve Vit D levels of 30-40, intake of 1100 mg daily calcium mostly thru dark green\par leafy greens and milk products, exercise 30 minutes TIW\par \par colon cancer\par \par FH peritoneal:\par -per Dr Gagnon repeat US pelvis\par \par Osteopenia:\par repeat BDS 9/21\par \par

## 2021-01-26 NOTE — PHYSICAL EXAM
[Appropriately responsive] : appropriately responsive [Alert] : alert [No Acute Distress] : no acute distress [No Lymphadenopathy] : no lymphadenopathy [Regular Rate Rhythm] : regular rate rhythm [No Murmurs] : no murmurs [Clear to Auscultation B/L] : clear to auscultation bilaterally [Soft] : soft [Non-tender] : non-tender [Non-distended] : non-distended [No HSM] : No HSM [No Lesions] : no lesions [No Mass] : no mass [Oriented x3] : oriented x3 [FreeTextEntry7] : left groin, numular red raised rash, itchy [Labia Majora] : normal [Labia Minora] : normal [Uterine Adnexae] : normal [Normal] : normal [No Tenderness] : no tenderness [FreeTextEntry9] : Vane neg

## 2021-01-26 NOTE — HISTORY OF PRESENT ILLNESS
[FreeTextEntry1] : 61 yo  with LMP 47 for follow up exam\par \par Menstrual triad: 13 x 28 x 5\par never OCP nor HRT.\par \par Mother with peritoneal cancer, 65,  67.\par Cythnia quit tobacco 2018.  at highest only half pack a day.\par \par  colon resection for stage\par \par 2020 gastric slever at [facility], Dr Lauren\par 70# wt loss. No early satiety.\par Occasional constipation. Taking zinc, B vit and MV\par \par  [BoneDensityDate] : 9/16/19 [ColonoscopyDate] : 2018 [TextBox_43] : follow up to colon cancer Dr Phillip

## 2021-01-27 ENCOUNTER — NON-APPOINTMENT (OUTPATIENT)
Age: 61
End: 2021-01-27

## 2021-02-25 ENCOUNTER — APPOINTMENT (OUTPATIENT)
Dept: ORTHOPEDIC SURGERY | Facility: CLINIC | Age: 61
End: 2021-02-25
Payer: MEDICARE

## 2021-02-25 VITALS
RESPIRATION RATE: 54 BRPM | BODY MASS INDEX: 28.32 KG/M2 | HEIGHT: 61 IN | WEIGHT: 150 LBS | DIASTOLIC BLOOD PRESSURE: 72 MMHG | SYSTOLIC BLOOD PRESSURE: 117 MMHG

## 2021-02-25 PROCEDURE — 73030 X-RAY EXAM OF SHOULDER: CPT | Mod: LT

## 2021-02-25 PROCEDURE — 99213 OFFICE O/P EST LOW 20 MIN: CPT

## 2021-03-02 ENCOUNTER — APPOINTMENT (OUTPATIENT)
Dept: MRI IMAGING | Facility: CLINIC | Age: 61
End: 2021-03-02
Payer: MEDICARE

## 2021-03-02 ENCOUNTER — OUTPATIENT (OUTPATIENT)
Dept: OUTPATIENT SERVICES | Facility: HOSPITAL | Age: 61
LOS: 1 days | End: 2021-03-02
Payer: MEDICARE

## 2021-03-02 ENCOUNTER — RESULT REVIEW (OUTPATIENT)
Age: 61
End: 2021-03-02

## 2021-03-02 DIAGNOSIS — Z90.49 ACQUIRED ABSENCE OF OTHER SPECIFIED PARTS OF DIGESTIVE TRACT: Chronic | ICD-10-CM

## 2021-03-02 DIAGNOSIS — K46.9 UNSPECIFIED ABDOMINAL HERNIA WITHOUT OBSTRUCTION OR GANGRENE: Chronic | ICD-10-CM

## 2021-03-02 DIAGNOSIS — S82.209P UNSPECIFIED FRACTURE OF SHAFT OF UNSPECIFIED TIBIA, SUBSEQUENT ENCOUNTER FOR CLOSED FRACTURE WITH MALUNION: Chronic | ICD-10-CM

## 2021-03-02 DIAGNOSIS — Z98.89 OTHER SPECIFIED POSTPROCEDURAL STATES: Chronic | ICD-10-CM

## 2021-03-02 DIAGNOSIS — M25.512 PAIN IN LEFT SHOULDER: ICD-10-CM

## 2021-03-02 DIAGNOSIS — Z96.651 PRESENCE OF RIGHT ARTIFICIAL KNEE JOINT: Chronic | ICD-10-CM

## 2021-03-02 PROCEDURE — G1004: CPT

## 2021-03-02 PROCEDURE — 73221 MRI JOINT UPR EXTREM W/O DYE: CPT

## 2021-03-02 PROCEDURE — 73221 MRI JOINT UPR EXTREM W/O DYE: CPT | Mod: 26,LT,ME

## 2021-03-19 ENCOUNTER — APPOINTMENT (OUTPATIENT)
Dept: ORTHOPEDIC SURGERY | Facility: CLINIC | Age: 61
End: 2021-03-19
Payer: MEDICARE

## 2021-03-19 VITALS
DIASTOLIC BLOOD PRESSURE: 71 MMHG | HEIGHT: 61 IN | BODY MASS INDEX: 28.32 KG/M2 | SYSTOLIC BLOOD PRESSURE: 109 MMHG | WEIGHT: 150 LBS | HEART RATE: 62 BPM

## 2021-03-19 VITALS — TEMPERATURE: 96.3 F

## 2021-03-19 PROCEDURE — 99213 OFFICE O/P EST LOW 20 MIN: CPT

## 2021-04-27 ENCOUNTER — NON-APPOINTMENT (OUTPATIENT)
Age: 61
End: 2021-04-27

## 2021-05-03 ENCOUNTER — RESULT REVIEW (OUTPATIENT)
Age: 61
End: 2021-05-03

## 2021-05-03 ENCOUNTER — APPOINTMENT (OUTPATIENT)
Dept: MAMMOGRAPHY | Facility: CLINIC | Age: 61
End: 2021-05-03
Payer: MEDICARE

## 2021-05-03 ENCOUNTER — APPOINTMENT (OUTPATIENT)
Dept: ULTRASOUND IMAGING | Facility: CLINIC | Age: 61
End: 2021-05-03
Payer: MEDICARE

## 2021-05-03 ENCOUNTER — OUTPATIENT (OUTPATIENT)
Dept: OUTPATIENT SERVICES | Facility: HOSPITAL | Age: 61
LOS: 1 days | End: 2021-05-03
Payer: MEDICARE

## 2021-05-03 DIAGNOSIS — Z98.89 OTHER SPECIFIED POSTPROCEDURAL STATES: Chronic | ICD-10-CM

## 2021-05-03 DIAGNOSIS — K46.9 UNSPECIFIED ABDOMINAL HERNIA WITHOUT OBSTRUCTION OR GANGRENE: Chronic | ICD-10-CM

## 2021-05-03 DIAGNOSIS — Z80.41 FAMILY HISTORY OF MALIGNANT NEOPLASM OF OVARY: ICD-10-CM

## 2021-05-03 DIAGNOSIS — Z12.31 ENCOUNTER FOR SCREENING MAMMOGRAM FOR MALIGNANT NEOPLASM OF BREAST: ICD-10-CM

## 2021-05-03 DIAGNOSIS — S82.209P UNSPECIFIED FRACTURE OF SHAFT OF UNSPECIFIED TIBIA, SUBSEQUENT ENCOUNTER FOR CLOSED FRACTURE WITH MALUNION: Chronic | ICD-10-CM

## 2021-05-03 DIAGNOSIS — N64.9 DISORDER OF BREAST, UNSPECIFIED: ICD-10-CM

## 2021-05-03 DIAGNOSIS — Z90.49 ACQUIRED ABSENCE OF OTHER SPECIFIED PARTS OF DIGESTIVE TRACT: Chronic | ICD-10-CM

## 2021-05-03 DIAGNOSIS — Z12.39 ENCOUNTER FOR OTHER SCREENING FOR MALIGNANT NEOPLASM OF BREAST: ICD-10-CM

## 2021-05-03 DIAGNOSIS — R92.1 MAMMOGRAPHIC CALCIFICATION FOUND ON DIAGNOSTIC IMAGING OF BREAST: ICD-10-CM

## 2021-05-03 DIAGNOSIS — N63.15 UNSPECIFIED LUMP IN THE RIGHT BREAST, OVERLAPPING QUADRANTS: ICD-10-CM

## 2021-05-03 DIAGNOSIS — Z96.651 PRESENCE OF RIGHT ARTIFICIAL KNEE JOINT: Chronic | ICD-10-CM

## 2021-05-03 PROCEDURE — 77067 SCR MAMMO BI INCL CAD: CPT | Mod: 26

## 2021-05-03 PROCEDURE — 77063 BREAST TOMOSYNTHESIS BI: CPT

## 2021-05-03 PROCEDURE — 77063 BREAST TOMOSYNTHESIS BI: CPT | Mod: 26

## 2021-05-03 PROCEDURE — 76830 TRANSVAGINAL US NON-OB: CPT | Mod: 26

## 2021-05-03 PROCEDURE — 76856 US EXAM PELVIC COMPLETE: CPT | Mod: 26

## 2021-05-03 PROCEDURE — 76641 ULTRASOUND BREAST COMPLETE: CPT | Mod: 26,50

## 2021-05-03 PROCEDURE — 76641 ULTRASOUND BREAST COMPLETE: CPT

## 2021-05-03 PROCEDURE — 76830 TRANSVAGINAL US NON-OB: CPT

## 2021-05-03 PROCEDURE — 76856 US EXAM PELVIC COMPLETE: CPT

## 2021-05-03 PROCEDURE — 77067 SCR MAMMO BI INCL CAD: CPT

## 2021-05-05 ENCOUNTER — NON-APPOINTMENT (OUTPATIENT)
Age: 61
End: 2021-05-05

## 2021-05-07 ENCOUNTER — OUTPATIENT (OUTPATIENT)
Dept: OUTPATIENT SERVICES | Facility: HOSPITAL | Age: 61
LOS: 1 days | End: 2021-05-07
Payer: MEDICARE

## 2021-05-07 ENCOUNTER — APPOINTMENT (OUTPATIENT)
Dept: MAMMOGRAPHY | Facility: CLINIC | Age: 61
End: 2021-05-07
Payer: MEDICARE

## 2021-05-07 ENCOUNTER — RESULT REVIEW (OUTPATIENT)
Age: 61
End: 2021-05-07

## 2021-05-07 DIAGNOSIS — Z98.89 OTHER SPECIFIED POSTPROCEDURAL STATES: Chronic | ICD-10-CM

## 2021-05-07 DIAGNOSIS — Z90.49 ACQUIRED ABSENCE OF OTHER SPECIFIED PARTS OF DIGESTIVE TRACT: Chronic | ICD-10-CM

## 2021-05-07 DIAGNOSIS — K46.9 UNSPECIFIED ABDOMINAL HERNIA WITHOUT OBSTRUCTION OR GANGRENE: Chronic | ICD-10-CM

## 2021-05-07 DIAGNOSIS — R92.8 OTHER ABNORMAL AND INCONCLUSIVE FINDINGS ON DIAGNOSTIC IMAGING OF BREAST: ICD-10-CM

## 2021-05-07 DIAGNOSIS — Z96.651 PRESENCE OF RIGHT ARTIFICIAL KNEE JOINT: Chronic | ICD-10-CM

## 2021-05-07 DIAGNOSIS — S82.209P UNSPECIFIED FRACTURE OF SHAFT OF UNSPECIFIED TIBIA, SUBSEQUENT ENCOUNTER FOR CLOSED FRACTURE WITH MALUNION: Chronic | ICD-10-CM

## 2021-05-07 PROCEDURE — G0279: CPT | Mod: 26

## 2021-05-07 PROCEDURE — G0279: CPT

## 2021-05-07 PROCEDURE — 77065 DX MAMMO INCL CAD UNI: CPT

## 2021-05-07 PROCEDURE — 77065 DX MAMMO INCL CAD UNI: CPT | Mod: 26,LT

## 2021-05-11 ENCOUNTER — NON-APPOINTMENT (OUTPATIENT)
Age: 61
End: 2021-05-11

## 2021-05-15 ENCOUNTER — RESULT REVIEW (OUTPATIENT)
Age: 61
End: 2021-05-15

## 2021-05-15 ENCOUNTER — APPOINTMENT (OUTPATIENT)
Dept: MAMMOGRAPHY | Facility: CLINIC | Age: 61
End: 2021-05-15
Payer: MEDICARE

## 2021-05-15 ENCOUNTER — OUTPATIENT (OUTPATIENT)
Dept: OUTPATIENT SERVICES | Facility: HOSPITAL | Age: 61
LOS: 1 days | End: 2021-05-15
Payer: MEDICARE

## 2021-05-15 DIAGNOSIS — N63.20 UNSPECIFIED LUMP IN THE LEFT BREAST, UNSPECIFIED QUADRANT: ICD-10-CM

## 2021-05-15 DIAGNOSIS — Z98.89 OTHER SPECIFIED POSTPROCEDURAL STATES: Chronic | ICD-10-CM

## 2021-05-15 DIAGNOSIS — S82.209P UNSPECIFIED FRACTURE OF SHAFT OF UNSPECIFIED TIBIA, SUBSEQUENT ENCOUNTER FOR CLOSED FRACTURE WITH MALUNION: Chronic | ICD-10-CM

## 2021-05-15 DIAGNOSIS — Z96.651 PRESENCE OF RIGHT ARTIFICIAL KNEE JOINT: Chronic | ICD-10-CM

## 2021-05-15 DIAGNOSIS — Z90.49 ACQUIRED ABSENCE OF OTHER SPECIFIED PARTS OF DIGESTIVE TRACT: Chronic | ICD-10-CM

## 2021-05-15 DIAGNOSIS — K46.9 UNSPECIFIED ABDOMINAL HERNIA WITHOUT OBSTRUCTION OR GANGRENE: Chronic | ICD-10-CM

## 2021-05-15 DIAGNOSIS — R92.8 OTHER ABNORMAL AND INCONCLUSIVE FINDINGS ON DIAGNOSTIC IMAGING OF BREAST: ICD-10-CM

## 2021-05-15 PROCEDURE — 19081 BX BREAST 1ST LESION STRTCTC: CPT | Mod: LT

## 2021-05-15 PROCEDURE — 88305 TISSUE EXAM BY PATHOLOGIST: CPT | Mod: 26

## 2021-05-15 PROCEDURE — A4648: CPT

## 2021-05-15 PROCEDURE — 77065 DX MAMMO INCL CAD UNI: CPT

## 2021-05-15 PROCEDURE — 77065 DX MAMMO INCL CAD UNI: CPT | Mod: 26,LT

## 2021-05-15 PROCEDURE — 88305 TISSUE EXAM BY PATHOLOGIST: CPT

## 2021-05-15 PROCEDURE — 19081 BX BREAST 1ST LESION STRTCTC: CPT

## 2021-05-20 ENCOUNTER — NON-APPOINTMENT (OUTPATIENT)
Age: 61
End: 2021-05-20

## 2021-06-10 NOTE — H&P PST ADULT - NSANTHBMIRD_ENT_A_CORE
NEPHROLOGY VISIT REPORT    DATE OF VISIT:  6/10/2021    PATIENT NAME:  REYNA JETER  1948  [id number]    PRIMARY CARE PROVIDER:  Estrella Mera DO    NEPHROLOGY HISTORY:   Reyna Jeter is a 73 year old female presenting with history of CKD stage IIIA A1.  The etiology of her chronic kidney disease is thought to be secondary to age-related decline, hypertension, diabetes, and long standing daily NSAID use.  She had previously been following with Dr. Gibbs.  Her baseline creatinine hovers between 1 and 1.3 mg/dL and has been within this range since 2015. Her estimated EGFR is generally between for E and 50 mL/min.    She also has a medical history significant for Takotsubo's cardiomyopathy, prior polysubstance abuse, Graves disease, obstructive sleep apnea, and hypertension.  Most recent echocardiogram 04/2021 showed mildly thickened septal wall, grade 1 diastolic dysfunction and no systolic dysfunction      INTERIM EVENTS:  She was last seen by Dr. Gibbs 05/2021 and was evaluated for acute kidney injury thought to be secondary to aggressive diuresis.  Renal function has been highly fluctuant, with her most recent creatinine prior to this appointment at 1.23 mg/dL with the EGFR of 44 mL/min.  She feels very well at this time, reports no urinary complaints.  No dizziness, shortness of breath, lightheadedness, chest pain to report.    ROS:    Eye Problem(s):negative  ENT Problem(s):negative  Cardiovascular problem(s):negative  Respiratory problem(s):negative  Gastro-intestinal problem(s):negative GI  Genito-urinary problem(s):negative  Musculoskeletal problem(s):negative  Integumentary problem(s):negative  Neurological problem(s):negative  Psychiatric problem(s):negative  Endocrine problem(s):negative  Hematologic and/or Lymphatic problem(s):negative    MEDICAL HISTORY:  Past Medical History:   Diagnosis Date   • Depression    • Diabetes mellitus (CMS/[facility])    • Essential (primary) hypertension    • Pain in right shoulder  3/12/2014   • Sleep apnea     not using machine   • Thyroid condition     Grave's Disease -Radiation Pill for Thyroid    • Umbilical hernia          SURGICAL HISTORY:  Past Surgical History:   Procedure Laterality Date   • Anterior cruciate ligament repair   and     right   • Colonoscopy  14    repeat in 1 year   • Colonoscopy  12/22/15    repeat in 3 yrs   • Colonoscopy  2019    repeat colon in 5 yrs, adenoma polyps   • Coronary angiogram - cv  14   • Echocardiogram  10/9/14, 14   • Eye surgery  2013    bilateral cataracts   • Hand surgery      left   • Orif humerus fracture  10/1993    right   • Pulmonary function test  10/9/14   • Tonsillectomy and adenoidectomy     • Total knee arthroplasty  2009    right   • Total knee arthroplasty Left 8/18/15   • Umbilical hernia repair  14       SOCIAL HISTORY:  Social History     Socioeconomic History   • Marital status: Single     Spouse name: Not on file   • Number of children: 0   • Years of education: Not on file   • Highest education level: Not on file   Occupational History     Employer: [employer]   Tobacco Use   • Smoking status: Former Smoker     Packs/day: 0.00     Years: 30.00     Pack years: 0.00     Types: Cigarettes     Quit date: 2014     Years since quittin.7   • Smokeless tobacco: Never Used   • Tobacco comment: 30 years social smoker   Substance and Sexual Activity   • Alcohol use: Yes     Alcohol/week: 3.0 standard drinks     Types: 1 Glasses of wine, 2 Shots of liquor per week     Comment: Rarely    • Drug use: Yes     Types: Marijuana     Comment: within the month    • Sexual activity: Yes     Partners: Female   Other Topics Concern   •  Service Not Asked   • Blood Transfusions Not Asked   • Caffeine Concern Not Asked   • Occupational Exposure Not Asked   • Hobby Hazards Not Asked   • Sleep Concern Not Asked   • Stress Concern Not Asked   • Weight Concern Not Asked   • Special Diet Not  Asked   • Back Care Not Asked   • Exercise Not Asked   • Bike Helmet Not Asked   • Seat Belt Yes   • Self-Exams Not Asked   Social History Narrative    The patient lives in [place].  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  She consumes 1 L of coke per day.     Social Determinants of Health     Financial Resource Strain:    • Social Determinants: Financial Resource Strain:    Food Insecurity:    • Social Determinants: Food Insecurity:    Transportation Needs:    • Lack of Transportation (Medical):    • Lack of Transportation (Non-Medical):    Physical Activity:    • Days of Exercise per Week:    • Minutes of Exercise per Session:    Stress:    • Social Determinants: Stress:    Social Connections:    • Social Determinants: Social Connections:    Intimate Partner Violence:    • Social Determinants: Intimate Partner Violence Past Fear:    • Social Determinants: Intimate Partner Violence Current Fear:       reports current alcohol use of about 3.0 standard drinks of alcohol per week.     FAMILY HISTORY:  Family History   Problem Relation Age of Onset   • Cancer Mother         lung   • Depression Mother    • Early death Mother 64   • Psychiatric Mother    • Miscarriages / Stillbirths Mother    • Hypertension Father    • Substance abuse Father         ETOH   • Arthritis Sister    • Asthma Sister    • Depression Sister    • Substance abuse Sister         ETOH   • Heart disease Maternal Grandmother    • Cancer Maternal Grandmother    • Diabetes Maternal Grandmother    • Cancer Paternal Grandmother    • Early death Paternal Grandmother    • Cancer Paternal Grandfather    • Early death Paternal Grandfather         ALLERGIES:   ALLERGIES:  Patient has no known allergies.     MEDICATIONS:  Current Outpatient Medications   Medication Sig   • glipiZIDE (GLUCOTROL ER) 10 MG 24 hr tablet TAKE 1 TABLET TWICE DAILY   • metformin (GLUCOPHAGE) 1000 MG tablet TAKE 1 TABLET TWICE DAILY WITH MEALS   • sertraline (ZOLOFT) 100 MG tablet  TAKE 1 TABLET EVERY DAY   • carvedilol (COREG) 25 MG tablet TAKE 1 TABLET TWICE DAILY WITH MEALS   • spironolactone (ALDACTONE) 25 MG tablet TAKE 1 TABLET EVERY DAY (Patient taking differently: Take 25 mg by mouth. )   • furosemide (LASIX) 20 MG tablet TAKE 1 TABLET EVERY DAY (Patient taking differently: Take 20 mg by mouth daily. )   • dulaglutide (Trulicity) 1.5 MG/0.5ML pen-injector INJECT 1.5MG (1 PEN) SUBCUTANEOUSLY EVERY WEEK   • allopurinol (ZYLOPRIM) 300 MG tablet Take 1 tablet by mouth daily.   • levothyroxine 125 MCG tablet Take 1 tablet by mouth daily.   • zoster vaccine recomb adjuvanted (SHINGRIX) 50 MCG/0.5ML injection Repeat dose in 2 to 6 months (unless 1 dose already given), for a total of 2 doses.   • lisinopril (ZESTRIL) 20 MG tablet TAKE 1 TABLET TWICE DAILY   • pravastatin (PRAVACHOL) 40 MG tablet TAKE 1 AND 1/2 TABLETS DAILY.   • blood glucose test strip Test blood sugar daily times daily as directed. Diagnosis: E11.9   • Blood Glucose Monitoring Suppl (BLOOD GLUCOSE MONITOR SYSTEM) w/Device Kit Check blood sugars daily.   • Lancets 28G Misc Test blood sugars daily.   • aspirin (ECOTRIN) 81 MG EC tablet Take 81 mg by mouth daily.   • Docusate Calcium (STOOL SOFTENER PO) Take 1 capsule by mouth nightly.   • vitamin - therapeutic multivitamins w/minerals (CENTRUM SILVER,THERA-M) TABS Take 1 tablet by mouth nightly.   • fish oil-omega-3 fatty acids 1000 MG CAPS Take 1 capsule by mouth daily. 1200 mg daily     No current facility-administered medications for this visit.         Physical Exam:  Vitals:    Visit Vitals  /82 (BP Location: LUE - Left upper extremity, Patient Position: Sitting, Cuff Size: Large Adult)   Pulse 82   Wt 113 kg   BMI 40.22 kg/m²     General appearance:  Morbidly obese, Alert, awake, oriented x3, comfortable, in no distress  HEENT: Within normal limits, oral mucosa dry, no oral ulcers, no oral thrush  Cardiovascular: Normal sinus rhythm, S1 and S2 normal, no murmurs  appreciated, no JVD  Pulmonary: Bilaterally clear to auscultation  Gastrointestinal: Soft, nontender, nondistended, bowel sounds present  Genitourinary: no CVA tenderness, no suprapubic tenderness, no groin pain   Integumentary: no rashes  Musculoskeletal: b/l no pedal edema, no clubbing, no cyanosis, no joint swelling  Neurologic: No focal sensory or motor deficits noted    LABS:    Renal Panel  Recent Labs   Lab 06/07/21  1534 05/03/21  1648 04/01/21  1239 03/08/21  1304 02/26/21  1621 09/18/20  1308 08/27/20  1426   Sodium 142 145 143  --  142  --  142   Potassium 4.1 4.1 4.0  --  4.2  --  4.3   Chloride 109* 109* 105  --  106  --  106   Carbon Dioxide 27 26 21  --  25  --  27   BUN 29* 27* 34*  --  40*  --  24*   Creatinine 1.23* 1.06* 1.53*  --  1.31*  --  1.05*   Glomerular Filtration Rate 44* 53* 34*  --  41*  --  54*   Glucose 140* 77 156*  --  90  --  75   Calcium 9.1 8.8 9.2  --  9.1  --  8.8   Phosphorus 3.1 3.6 4.2  --   --   --   --    Uric Acid  --   --   --  9.7*  --  10.4*  --    Albumin 3.8 3.5* 3.7  --  3.9  --  3.5*       Metabolic Bone Parameters   No results for input(s): VITD25 in the last 8765 hours.    Invalid input(s): AJFX429,  INTAC    Hematopoietic Parameters   Recent Labs   Lab 05/03/21  1648 04/01/21  1239 02/26/21  1621   WBC 7.5 8.3 9.4   HGB 11.0* 11.4* 11.6*    218 192     No results for input(s): IRON, TIBC, FERR, PST, RFOL, VB12, RCNT, IFOB in the last 8765 hours.    Invalid input(s): HAPT    Nutritional Parameters   No results for input(s): CHOLESTEROL, TRIGLYCERIDE, HDL, CALCLDL, PAB in the last 8765 hours.    Endocrine Parameters   Recent Labs   Lab 02/26/21  1621 08/27/20  1426   TSH 0.079*  --    Hemoglobin A1C 6.2* 6.5*         Visit Diagnosis  1. Stage 3 chronic kidney disease, unspecified whether stage 3a or 3b CKD (CMS/HCC)    2. Stage 3a chronic kidney disease (CMS/HCC)    3. Vitamin D deficiency    4. Type 2 diabetes mellitus with other specified complication,  without long-term current use of insulin (CMS/[facility])         Assessment and Plan:  Reyna Gunn is a 73 year old female with a history of Takotsubo's cardiomyopathy, polysubstance abuse, hypertension, diabetes mellitus,  who presents for evaluation of:    CKD stage IIIB A1:  Chronic kidney disease is likely secondary to hypertension, nephron attrition secondary to senescent changes, diabetic nephropathy without albuminuria, chronic NSAID use.  Renal function is variable and creatinine ranges between 1 and 1.3 mg/dL, likely secondary to hemodynamic fluctuations.  -knows to continue to stay off of NSAIDs, has not used them for a while.  -continue current diuretic dosing, she feels well with this.  -continue lisinopril 20 mg b.i.d.    Blood pressure/volume:  Patient preference is to have tight volume control; she is euvolemic and feels good.  Continue current dose of spironolactone as well as furosemide.      Anemia:  Will check iron panel prior to next visit.  Hemoglobin is at 11 grams/deciliter.    Mineral and Bone Health:  Will check parathyroid as well as vitamin-D levels prior to next visit    Diabetes mellitus:  Better controlled currently on metformin as well as Trulicity.  If additional anti-hyperglycemic medications are in order can consider Jardiance as a possibility      FOLLOW UP VISIT:  Return in about 8 months (around 2/10/2022).    Total time of the visit was 35 minutes and more than 50% was spent in reviewing with the patient lab test results, risk factors, prognostic factors, and counseling about controlling the risk factors to decrease the progression of the disease.     Thank you for allowing me to participate in the care of Reyna Gunn.  Please do not hesitate to contact me with any questions.    Pj Joe MD  [facility]     Yes

## 2021-06-28 ENCOUNTER — EMERGENCY (EMERGENCY)
Facility: HOSPITAL | Age: 61
LOS: 1 days | Discharge: HOME | End: 2021-06-28
Attending: EMERGENCY MEDICINE
Payer: MEDICARE

## 2021-06-28 VITALS
OXYGEN SATURATION: 99 % | SYSTOLIC BLOOD PRESSURE: 136 MMHG | HEIGHT: 61 IN | WEIGHT: 160.06 LBS | RESPIRATION RATE: 18 BRPM | DIASTOLIC BLOOD PRESSURE: 75 MMHG | HEART RATE: 64 BPM | TEMPERATURE: 98 F

## 2021-06-28 DIAGNOSIS — Z98.89 OTHER SPECIFIED POSTPROCEDURAL STATES: Chronic | ICD-10-CM

## 2021-06-28 DIAGNOSIS — Z96.651 PRESENCE OF RIGHT ARTIFICIAL KNEE JOINT: Chronic | ICD-10-CM

## 2021-06-28 DIAGNOSIS — K46.9 UNSPECIFIED ABDOMINAL HERNIA WITHOUT OBSTRUCTION OR GANGRENE: Chronic | ICD-10-CM

## 2021-06-28 DIAGNOSIS — Z90.49 ACQUIRED ABSENCE OF OTHER SPECIFIED PARTS OF DIGESTIVE TRACT: Chronic | ICD-10-CM

## 2021-06-28 DIAGNOSIS — S82.209P UNSPECIFIED FRACTURE OF SHAFT OF UNSPECIFIED TIBIA, SUBSEQUENT ENCOUNTER FOR CLOSED FRACTURE WITH MALUNION: Chronic | ICD-10-CM

## 2021-06-28 PROCEDURE — 99284 EMERGENCY DEPT VISIT MOD MDM: CPT

## 2021-06-28 NOTE — ED ADULT TRIAGE NOTE - CHIEF COMPLAINT QUOTE
C/o left sided rib pain x1 day. Describes as constant and stabbing. Pt states, "I was holding a laundry basket and someone opened the door and the basket hit into my side". + SOB. Hx of neuropathy.

## 2021-06-28 NOTE — ED ADULT TRIAGE NOTE - BP NONINVASIVE SYSTOLIC (MM HG)
2021    MD Yodit Valdez [address] [facility]    Patient: Barbara Alvarez   YOB: 1970   Date of Visit: 2021     Encounter Diagnosis     ICD-10-CM    1  Bursitis of right shoulder  M75 51        Dear Dr Alessandro Cantu:    Thank you for your recent referral of Barbara Alvarez  Please review the attached evaluation summary from Evan's recent visit  Please verify that you agree with the plan of care by signing the attached order  If you have any questions or concerns, please do not hesitate to call  I sincerely appreciate the opportunity to share in the care of one of your patients and hope to have another opportunity to work with you in the near future  Sincerely,    Johana Betancourt, PT      Referring Provider:      I certify that I have read the below Plan of Care and certify the need for these services furnished under this plan of treatment while under my care  MD Yodit Valdez [address] [place] [id number]  [place] [place]          PT Re-Evaluation     Today's date: 2021  Patient name: Barbara Alvarez  : 1970  MRN: [id number]  Referring provider: Breezy Melara MD  Dx:   Encounter Diagnosis     ICD-10-CM    1  Bursitis of right shoulder  M75 51                   Assessment  Assessment details: The patient has been seen in PT for a total of 11 visits since [facility] with good PT attendance noted  He has made improvements since [facility] and is making progress towards his goals  He has complaints of constant pain along his anterior shoulder and into his upper arm, increased pain noted since Monday  He denies any numbness and tingling into RUE  He demonstrates deficits with decreased A/PROM and strength, decreased flexibility, decreased postural awareness, difficulty sleeping and pain and difficulty with completing his ADLs  He has not been using his sling over the past week    Increased ease with completing his ADLs though he still compensates with use of LUE at times  He has difficulty with completing tasks such as reaching [address], out to the side and behind his back  He still has difficulty with sleeping and pain can wake him up overnight or he has a hard time finding a comfortable position to sleep  He remains off work  Secondary to pain and above deficits he is limited with his overall mobility and function  The patient would benefit from continued PT to address deficits and improve function  Tx to include ROM, stretching, strengthening, modalities, HEP, pt education, postural ed, lifting/body mechanics, neuro re-ed, balance/proprioception Te, MT and equipment  Plan to continue with PT until his doctor appointment and to await any recommendations  Have instructed patient to phone doctor about getting an earlier appointment secondary to bruising and increased pain  Impairments: abnormal or restricted ROM, activity intolerance, impaired physical strength, lacks appropriate home exercise program, pain with function, poor posture  and poor body mechanics  Other impairment: decreased flexibility, TTP along with soft tissue inflammation  Functional limitations: difficulty sleeping  Understanding of Dx/Px/POC: good   Prognosis: good    Goals  STGs:  1  Initiate and complete HEP with verbal cues  - met  2  Decrease R shoulder pain by > 25% in 4 weeks  - not met  3  Improve R shoulder strength by 1/2 grade in 4 weeks  - NA today  4  Improve R shoulder ROM by 15-20 degrees in 4 weeks  - met  LTGs:  1  Patient to be I with HEP in 12 weeks  2   Improve R shoulder ROM to WNL t/o in 12 weeks to improve function  3   Improve RUE strength to > or = to 4+ to 5/5 t/o in 12 weeks to improve function  4   Decrease R shoulder pain to < or = to 2-3/10 with activity in 12 weeks to improve function  5   Recreational performance in related activities is improved to PLOF in 12 weeks    6   Postural control is improved to maximal level of function in 12 weeks  7   Work performance is improved to PLOF in 12 weeks  8   ADL performance is improved to PLOF in 12 weeks  9   Patient to report improved sleep in 12 weeks  Plan  Plan details: Modalities and therapy interventions prn  Plan to continue with PT until his doctor appointment and to await any recommendations  Have instructed patient to phone doctor about getting an earlier appointment secondary to bruising and increased pain  Patient would benefit from: skilled physical therapy  Planned modality interventions: cryotherapy, thermotherapy: hydrocollator packs and unattended electrical stimulation  Planned therapy interventions: manual therapy, body mechanics training, behavior modification, neuromuscular re-education, patient education, postural training, self care, strengthening, stretching, therapeutic activities, therapeutic exercise, flexibility and home exercise program  Frequency: 3x week (2-3 times/week)  Duration in weeks: 12  Plan of Care beginning date: 1/21/2021  Plan of Care expiration date: 4/21/2021  Treatment plan discussed with: patient        Subjective Evaluation    History of Present Illness  Date of surgery: 12/16/2020  Mechanism of injury: surgery  Mechanism of injury: The patient states that around July he developed R shoulder pain with no known cause  He notes that it was sore and stiff in the morning but the pain had been getting progressively worse with pain waking him up at night  He had gone to see Dr Tanya Saravia in October  He had x-rays completed, per patient they were negative  He was given a cortisone shot which did help  He was also prescribed medicine which has been helping  He was also referred to OPPT and he was previously seen in PT from 10/14/20 to 11/4/20 for a total of 6 visits  He had gone back to the doctor    He had an MRI completed and per patient it showed arthritis in the joint, bone spur, inflamed bursa sac and nerve impingement  The doctor had recommended surgery and he had same day surgery on 20  He was referred to OPPT and he now presents for his evaluation  He will be going back to see the doctor on 21 for his next appointment  UPDATE 21:  The patient states that he had more pain since he was in PT on Monday  Since Monday pain has been a constant -7/10  He notes that last night he noticed a bruise on his arm just above his elbow  He will be going back to see the doctor next Friday for his next appointment  Quality of life: good    Pain  At best pain ratin  At worst pain ratin  Location: R Shoulder - anterior shoulder, top of shoulder, into upper arm  Denies N&T  Quality: discomfort, dull ache and radiating  Relieving factors: medications and ice    Social Support  Lives with: spouse    Employment status: not working (Currently off work -  )  Hand dominance: right    Treatments  Previous treatment: physical therapy, injection treatment and medication  Patient Goals  Patient goals for therapy: decreased pain, increased motion, increased strength, return to work and independence with ADLs/IADLs  Patient goal: "To help get full ROM and strength, to get more mobility, get full use of my arm, to reduce the pain "          Objective     Static Posture     Shoulders  Rounded  Observations     Right Shoulder  Positive for incision  Additional Observation Details  Three portal sites noted t/o R shoulder - well healed  Bruise noted today along distal upper arm, above elbow  Tenderness     Right Shoulder  Tenderness in the [facility] joint and acromion  Additional Tenderness Details  TTP around incision sites        Neurological Testing     Sensation     Shoulder   Left Shoulder   Intact: light touch    Right Shoulder   Intact: light touch    Active Range of Motion   Left Shoulder   Flexion: 180 degrees   Abduction: 180 degrees   External rotation 90°: 90 degrees   Internal rotation 90°: 60 degrees     Right Shoulder   Flexion: 156 degrees   Abduction: 90 degrees with pain  External rotation 45°: 45 degrees with pain  Internal rotation 45°: 50 degrees     Left Elbow   Flexion: WFL  Extension: WFL    Right Elbow   Flexion: WFL  Extension: WFL    Passive Range of Motion     Right Shoulder   Flexion: 170 degrees with pain  Abduction: 150 degrees with pain  External rotation 45°: 55 degrees with pain  Internal rotation 45°: 55 degrees with pain    Strength/Myotome Testing     Left Shoulder   Normal muscle strength    Left Elbow   Normal strength    Additional Strength Details  R Shoulder Strength NT today  R Elbow Strength NT today  Ambulation   Weight-Bearing Status   Assistive device used: none    Additional Weight-Bearing Status Details  No longer using sling - stopped about one week ago  Ambulation: Level Surfaces   Ambulation without assistive device: independent             Precautions: oJe Hernandez can remove stitches at 7-10 days post-op  Can change protocol on 1/27/21 at 6 weeks post-op      Re-Eval DUE: 2/21/21     Manuals 1/21/21 1/7/21 1-11-21 1-14-21 1/18/21   R Shoulder  10 minutes   To tolerance 10 minutes to tolerance 10 minutes to tolerance 10' to liat 10 minutes to tolerance                 Neuro Re-Ed             MTP/LTP Green 1 x 10 each     Green 1 x 10 each Green 1 x 10 each                               Ther Ex 1/21/21 1/7/21 1-11-21 1-14-21 1/18/21   Pendulums 1 x 10 each 1x10 ea 1 x 10 each 1 x 10 each 1x10 ea   Elbow ROM D/C 1x10  1 x 10 1 x 10 1x10   Wrist ROM D/C 1x10 each 1 x 10 each DC DC   Digiflex - Comp/Ind D/C Blue 1x15 each Blue 1 x 15 each DC DC   Shrugs/Rolls 1 x 10 each 2# 1x10 each 1 x 10 each 2# 1 x 10 each 1x10 ea 2 #   Retractions D/C 1x10 1 x 10 DC DC   UBE Alt 10 minutes Alt 10 minutes Alt 10 minutes Alt 10 minutes Alt 10 minutes   Finger Ladder - Flex/Abd 1 x 3 each 1x13 each 1 x 3 each 1 x 3 each 1 x 3 each   Pulleys Flex/Abd 2 mins each 2 mins each 2 mins each 2 mins each 2 mins ea   Table Slides - Flex/Abd D/C 1x10 each 1 x 10 each DC DC   Wall slides 1 x 10 each     1 x 10 ea 1 x 10 ea    Isometrics Submax 3" 1 x 10 each 3" 1x10 each 3" 1  x10 ea 3" 1 x 10 each 3"x10 ea   Cane TE - Flex/Abd/ER 1 x 10 each  Flex/Abd 1x10 each flex/abd 1 x 10 each Flex/Abd 1 x 10 each  Flex/Abd 1 x 10 each  Flex/Abd   S/L ER 2 x 10  2x10 2 x 10 2  x10 2x10   S/L ABd 1 x 10     1 x 10 1 x 10    Prone Flex 2 x 10 2x10 2 x 10 2 x 10 2x10   Prone Horiz Abd 2 x 10  2x10 2 x 10 2 x 10 2x10   TBand ER/IR             Jonathan ER w/TBand Green 2 x 10  Red 2x10 Red 2 x 10 Green 2 x 10 GTB 2x10   Ther Activity                                         Gait Training                                         Modalities 1/21/21 1/7/21 1-11-21 1-14-21 1/18/21   CP prn Seated 10 minues Seated 10 minutes Supine   10 minutes Pt declined   to home Seated 10 min               136

## 2021-06-29 VITALS
DIASTOLIC BLOOD PRESSURE: 75 MMHG | HEART RATE: 62 BPM | TEMPERATURE: 98 F | RESPIRATION RATE: 15 BRPM | SYSTOLIC BLOOD PRESSURE: 122 MMHG | OXYGEN SATURATION: 98 %

## 2021-06-29 PROCEDURE — 93005 ELECTROCARDIOGRAM TRACING: CPT

## 2021-06-29 PROCEDURE — 99284 EMERGENCY DEPT VISIT MOD MDM: CPT | Mod: 25

## 2021-06-29 PROCEDURE — 71250 CT THORAX DX C-: CPT | Mod: 26,MA

## 2021-06-29 PROCEDURE — 71250 CT THORAX DX C-: CPT

## 2021-06-29 PROCEDURE — 93010 ELECTROCARDIOGRAM REPORT: CPT

## 2021-06-29 RX ORDER — IBUPROFEN 200 MG
1 TABLET ORAL
Qty: 20 | Refills: 0
Start: 2021-06-29 | End: 2021-07-03

## 2021-06-29 RX ORDER — IBUPROFEN 200 MG
600 TABLET ORAL ONCE
Refills: 0 | Status: COMPLETED | OUTPATIENT
Start: 2021-06-29 | End: 2021-06-29

## 2021-06-29 RX ADMIN — Medication 600 MILLIGRAM(S): at 01:20

## 2021-06-29 NOTE — ED PROVIDER NOTE - CLINICAL SUMMARY MEDICAL DECISION MAKING FREE TEXT BOX
PT with stable VS, no acute distress, non toxic appearing, tolerating PO in the ED, Pt with no acute traumatic findings, pt informed of incidental findings, PT to be dc home with supportive care, OTC pain meds, PCP, breast surgeon, pt given return precautions, educated about when to return to the ED if needed. PT verbalizes that he understands all instructions and results. Pt informed that ED is open and available 24/7 365 days a yr, encouraged to return to the ED if they have any change in condition, or feel the need for revaluation.
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
## 2021-06-29 NOTE — ED PROVIDER NOTE - PATIENT PORTAL LINK FT
You can access the FollowMyHealth Patient Portal offered by [facility] by registering at the following website: [email or web address]. By joining [employer]’s FollowMyHealth portal, you will also be able to view your health information using other applications (apps) compatible with our system.

## 2021-06-29 NOTE — ED PROVIDER NOTE - PROGRESS NOTE DETAILS
Saeed Neil PA-C: Pt informed of incidental findings on possibility of their being a malignancy in her breast Pt states that she has a private breast surgeon that she will follow up for this, pt states that she understands the importance of timely follow up for this condition.

## 2021-06-29 NOTE — ED PROVIDER NOTE - RESPIRATORY, MLM
ttp Lt ribs. NO RESP DISTRESS, breathing unlabored, LUNGS CTA B/L, RR 19, SPO2 98%, with activity 98%. NO secounday mucle use, NO retractions.
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
## 2021-06-29 NOTE — ED PROVIDER NOTE - NSFOLLOWUPINSTRUCTIONS_ED_ALL_ED_FT
Rib Contusion    WHAT YOU NEED TO KNOW:    A rib contusion is a bruise on one or more of your ribs.       DISCHARGE INSTRUCTIONS:    Return to the emergency department if:   •You have increased chest pain.       •You have shortness of breath.       •You start to cough up blood.      •Your pain does not improve with pain medicine.      Contact your healthcare provider if:   •You have a cough.      •You have a fever.       •You have questions or concerns about your condition or care.       Medicines: You may need any of the following:   •NSAIDs, such as ibuprofen, help decrease swelling, pain, and fever. This medicine is available with or without a doctor's order. NSAIDs can cause stomach bleeding or kidney problems in certain people. If you take blood thinner medicine, always ask if NSAIDs are safe for you. Always read the medicine label and follow directions. Do not give these medicines to children under 6 months of age without direction from your child's healthcare provider.      •Prescription pain medicine may be given. Ask how to take this medicine safely.      •Take your medicine as directed. Contact your healthcare provider if you think your medicine is not helping or if you have side effects. Tell him of her if you are allergic to any medicine. Keep a list of the medicines, vitamins, and herbs you take. Include the amounts, and when and why you take them. Bring the list or the pill bottles to follow-up visits. Carry your medicine list with you in case of an emergency.      Deep breathing:   •To help prevent pneumonia, take 10 deep breaths every hour, even when you wake up during the night. Brace your ribs with your hands or a pillow while you take deep breaths or cough. This will help decrease your pain.      •You may need to use an incentive spirometer to help you take deeper breaths. Put the plastic piece into your mouth and take a very deep breath. Hold your breath as long as you can. Then let out your breath. Do this 10 times in a row every hour while you are awake.      Rest: Rest your ribs to decrease swelling and allow the injury to heal faster. Avoid activities that may cause more pain or damage to your ribs. As your pain decreases, begin movements slowly.    Ice: Ice helps decrease swelling and pain. Ice may also help prevent tissue damage. Use an ice pack or put crushed ice in a plastic bag. Cover it with a towel and place it on your bruised area for 15 to 20 minutes every hour as directed.    Follow up with your healthcare provider as directed: Write down your questions so you remember to ask them during your visits.

## 2021-06-29 NOTE — ED PROVIDER NOTE - NS ED ROS FT
ROS: CONTUSIONAL: Denies fever, chills, fatigue, wt loss. HEAD: Denies trauma, HA, Dizziness. EYE: Denies Acute visual changes, diplopia. ENMT: Denies change in hearing, tinnitus, epistaxis, difficulty swallowing, sore throat. CARDIO: Denies CP, palpitations, edema. RESP: +RIB pain  Denies Cough, SOB , Diff breathing, hemoptysis. GI: Denies N/V, ABD pain, change in bowel movement. URINARY: Denies difficulty urinating, pelvic pain. MS:  Denies joint pain, back pain, weakness, decreased ROM, swelling. NEURO: Denies change in gait, seizures, loss of sensation, dizziness, confusion LOC.  PSY: NO SI/HI. SKIN: Denies Rash, bruising.
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
## 2021-07-22 ENCOUNTER — NON-APPOINTMENT (OUTPATIENT)
Age: 61
End: 2021-07-22

## 2021-08-20 ENCOUNTER — NON-APPOINTMENT (OUTPATIENT)
Age: 61
End: 2021-08-20

## 2021-08-24 ENCOUNTER — APPOINTMENT (OUTPATIENT)
Dept: ORTHOPEDIC SURGERY | Facility: CLINIC | Age: 61
End: 2021-08-24
Payer: MEDICARE

## 2021-08-24 VITALS
BODY MASS INDEX: 29.27 KG/M2 | HEART RATE: 69 BPM | HEIGHT: 61 IN | WEIGHT: 155 LBS | DIASTOLIC BLOOD PRESSURE: 80 MMHG | SYSTOLIC BLOOD PRESSURE: 121 MMHG

## 2021-08-24 DIAGNOSIS — M25.561 PAIN IN RIGHT KNEE: ICD-10-CM

## 2021-08-24 PROCEDURE — 99213 OFFICE O/P EST LOW 20 MIN: CPT

## 2021-08-24 PROCEDURE — 73562 X-RAY EXAM OF KNEE 3: CPT | Mod: RT

## 2021-08-24 NOTE — ADDENDUM
[FreeTextEntry1] : This note was authored by Areli Rendon working as a medical scribe for Dr. Ramin Heredia. The note was reviewed, edited, and revised by Dr. Ramin Heredia whom is in agreement with the exam findings, imaging findings, and treatment plan. 08/24/2021

## 2021-08-24 NOTE — HISTORY OF PRESENT ILLNESS
[de-identified] : Ms. MARC CASTRO is a 61 year old female, status post right TKR with removal of hardware right tibia 3/7/17.  Patient states she was doing very well up until June 2021 when she sustained a fall landing directly on the anterior aspect of the right knee.  She states she was able to get up from this fall however she had pain generalized to the entire knee.  This pain is slowly getting better and is now mostly localized anteriorly.  Her pain is worse with long periods of weightbearing activity including walking distance, and using stairs.  She denies any fevers or chills.  She denies any swelling of the knee this point.  She denies any further falls.  She presents today to make sure her knee replacement is stable.

## 2021-08-24 NOTE — DISCUSSION/SUMMARY
[de-identified] : The patient is a 61 year old female who presents with right leg pain radiating up and down the entire leg intermittently after a fall, s/p right total knee replacement done 10/27/17. X-rays are normal and show no sign of fracture or loosening of the implants. Her pain today is non-existent on exam and the knee looks great. We discussed that if she has pain radiating down the leg in the future this is likely caused by her back and not the knee.  She was satisfied and reassured by this.  I would like to see her back in 2 to 3 years for routine surveillance x-rays.

## 2021-08-24 NOTE — PHYSICAL EXAM
[de-identified] : The patient appears well nourished  and in no apparent distress.  The patient is alert and oriented to person, place, and time.   Affect and mood appear normal. The head is normocephalic and atraumatic.  The eyes reveal normal sclera and extra ocular muscles are intact. The tongue is midline with no apparent lesions.  Skin shows normal turgor with no evidence of eczema or psoriasis.  No respiratory distress noted.  Sensation grossly intact.\par   [de-identified] : Exam of the right knee shows a well healed incision, 0 to 120 degrees of flexion measured with a goniometer. No instability, no effusion, no pain with motion. 5/5 motor strength bilaterally distally. Sensation intact distally.  [de-identified] : X-ray: 3 views of the left knee demonstrate a total knee replacement in good alignment with a well centered patella, without evidence of loosening or subsidence, and no fracture.\par

## 2021-10-01 ENCOUNTER — APPOINTMENT (OUTPATIENT)
Dept: ORTHOPEDIC SURGERY | Facility: CLINIC | Age: 61
End: 2021-10-01
Payer: MEDICARE

## 2021-10-01 VITALS
HEART RATE: 63 BPM | DIASTOLIC BLOOD PRESSURE: 77 MMHG | HEIGHT: 61 IN | WEIGHT: 155 LBS | SYSTOLIC BLOOD PRESSURE: 121 MMHG | BODY MASS INDEX: 29.27 KG/M2

## 2021-10-01 PROCEDURE — 99213 OFFICE O/P EST LOW 20 MIN: CPT

## 2021-10-01 NOTE — DISCUSSION/SUMMARY
[de-identified] : Assessment: 61-year-old female with a left shoulder rotator cuff tear\par \par Plan: I had a long discussion with the patient today regarding the nature of their diagnosis and treatment plan.  We discussed the risks and benefits of no treatment as well as nonoperative and operative treatments.  I reviewed the patient's imaging with them today in the office which demonstrates severe tendinosis of the supraspinatus with an associated anterior full-thickness tear.  There is subacromial impingement with associated bursitis.  There is degenerative tearing of the labrum with synovitis.  There is also tenosynovitis of the long head of the biceps tendon. The patient has failed conservative treatment measures including rest, ice, heat, anti-inflammatory medications, and activity modifications.  They continue to have significant pain and functional limitations which interfere with their daily activities. At this time they would like to consider operative intervention as a more definitive treatment option for their symptoms.  Surgical treatment would include a left shoulder arthroscopic rotator cuff repair with subacromial decompression, synovectomy, and possible open subpectoral biceps tenodesis vs biceps tenotomy.  The risks and benefits of surgery were discussed in detail.  The benefits include improved shoulder pain and function.  The risks include but are not limited to infection, blood loss, blood clots, neurovascular injury, stiffness/loss of range of motion, fracture, failure of repair, persistent pain, anesthesia related complications including paralysis and death, and the need for further surgery in the future.  I explained to the patient that they will be nonweightbearing of their operative extremity in a shoulder immobilizer for a minimum of 6 weeks postoperatively.  They will not be permitted to drive while wearing the sling and/or while taking narcotic pain medications.  I also informed the patient that they will need to actively participate in physical therapy 2-3 days a week for a minimum of 4 to 6 months postoperatively in order to optimize their surgical outcome.  I expect most patients to make a full recovery at an average of 6 months postoperatively, but some patients take longer to recover and can continue to make improvements up to 1 year after surgery.  The patient verbalizes their understanding of all risks and comprehends the post-operative course and plan of care.  The patient is interested in surgery but cannot have the surgery until after the new year.  At this point her MRI is also more than 6 months old.  We will repeat an MRI in 1 month for presurgical planning purposes.  She will return to my office to review the MRI in December 2021 and to discuss any potential changes to her operative plan based on the MRI findings.  She may speak with my surgical coordinator regarding scheduling the procedure for January 2021.\par \par She verbalizes her understanding of this.  All questions were answered to her satisfaction.

## 2021-10-01 NOTE — HISTORY OF PRESENT ILLNESS
[de-identified] : 10/1/21: Sara is a pleasant 61-year-old right-hand-dominant female who returns the office today for follow-up regarding her left shoulder pain secondary to a rotator cuff tear.  The patient states that her pain is worse since her previous visit.  On her last office visit we did discuss surgical intervention for this which she is interested in.  She comes in today for further surgical discussion.  She denies any fevers, chills, sweats, numbness, tingling, or pain elsewhere.\par \par 3/19/21: Sara is a pleasant 61 year old female HD[R] who is being seen for follow-up for let shoulder pain.  The patient states that her symptoms are unchanged since her previous visit.  She has been taking anti-inflammatories as needed for pain.  She recently had an MRI of the shoulder and comes in to discuss those results with me today and any further recommendations.  She denies any fevers, chills, sweats, numbness, tingling, or pain elsewhere.

## 2021-10-01 NOTE — PHYSICAL EXAM
[de-identified] : General:\par Awake, alert, no acute distress, Patient was cooperative and appropriate during the examination.\par \par The patient is of normal weight for height and age.\par \par Walks without an antalgic gait.\par \par Full, painless range of motion of the neck and back.\par \par Exam of the bilateral lower extremities is intact and symmetric with regards to dermatologic, vascular, and neurologic exam. Bilateral lower extremity sensation is grossly intact to light touch in the DP/SP/T/S/S nerve distributions. Intact DF/PF/EHL. BIlateral lower extremities warm and well-perfused with brisk capillary refill.\par \par \par Pulmonary:\par Regular, nonlabored breathing\par \par Abdomen:\par Soft, nontender, nondistended.\par \par Lymphatic:\par No evidence of inguinal lymphadenopathy\par \par Left shoulder Exam:\par Physical exam of the shoulder demonstrates normal skin without signs of skin changes or abnormalities. No erythema, warmth, or joint effusion appreciated.  \par \par Sensation intact light touch C5-T1\par Palpable radial pulse\par Radial/ulnar/median/axillary/musculocutaneous/AIN/PIN nerves grossly intact\par \par Range of motion:\par Forward Flexion: 170\par Abduction: 150\par External Rotation: 45\par Internal Rotation: T12\par \par Palpation:\par Not tender to palpation over the glenohumeral joint\par Moderately tender palpation over the rotator cuff insertion on the greater tuberosity\par Mildly tender to palpation over the AC joint\par Mildly tender to palpation of the biceps tendon/bicipital groove\par \par Strength testing:\par Supraspinatus: 4/5\par Infraspinatus: 4+/5\par Subscapularis: 5/5\par \par Special test:\par Empty can test positive\par Velez impingement test positive\par Speeds test positive\par Clarke's test negative\par Lift-off test negative\par Bell-press test negative\par Cross-arm adduction test negative\par \par  [de-identified] : MRI of the left shoulder from a [facility] imaging facility on 3/4/2021 was reviewed with the patient today in the office.  The patient has severe tendinosis of the supraspinatus with a focal full-thickness tear of the anteriormost insertional fibers.  There is prominent osseous edema within this adjacent anterior portion of the greater tuberosity which is likely related to an osseous stress reaction or enthesopathic change.  There is moderate subacromial and subdeltoid bursitis with associated impingement.  There is degeneration of the glenoid labrum with undersurface fraying along the chondral labral junction of the superior labrum and associated synovitis.  There is also mild to moderate tenosynovitis of the long head of the biceps tendon.

## 2021-11-18 ENCOUNTER — OUTPATIENT (OUTPATIENT)
Dept: OUTPATIENT SERVICES | Facility: HOSPITAL | Age: 61
LOS: 1 days | End: 2021-11-18
Payer: MEDICARE

## 2021-11-18 ENCOUNTER — APPOINTMENT (OUTPATIENT)
Dept: MRI IMAGING | Facility: CLINIC | Age: 61
End: 2021-11-18
Payer: MEDICARE

## 2021-11-18 DIAGNOSIS — Z96.651 PRESENCE OF RIGHT ARTIFICIAL KNEE JOINT: Chronic | ICD-10-CM

## 2021-11-18 DIAGNOSIS — Z98.89 OTHER SPECIFIED POSTPROCEDURAL STATES: Chronic | ICD-10-CM

## 2021-11-18 DIAGNOSIS — M25.512 PAIN IN LEFT SHOULDER: ICD-10-CM

## 2021-11-18 DIAGNOSIS — Z90.49 ACQUIRED ABSENCE OF OTHER SPECIFIED PARTS OF DIGESTIVE TRACT: Chronic | ICD-10-CM

## 2021-11-18 DIAGNOSIS — K46.9 UNSPECIFIED ABDOMINAL HERNIA WITHOUT OBSTRUCTION OR GANGRENE: Chronic | ICD-10-CM

## 2021-11-18 DIAGNOSIS — S82.209P UNSPECIFIED FRACTURE OF SHAFT OF UNSPECIFIED TIBIA, SUBSEQUENT ENCOUNTER FOR CLOSED FRACTURE WITH MALUNION: Chronic | ICD-10-CM

## 2021-11-18 PROCEDURE — 73221 MRI JOINT UPR EXTREM W/O DYE: CPT | Mod: 26,LT,MH

## 2021-11-18 PROCEDURE — 73221 MRI JOINT UPR EXTREM W/O DYE: CPT

## 2021-12-02 ENCOUNTER — APPOINTMENT (OUTPATIENT)
Dept: ORTHOPEDIC SURGERY | Facility: CLINIC | Age: 61
End: 2021-12-02
Payer: MEDICARE

## 2021-12-02 VITALS
HEART RATE: 66 BPM | SYSTOLIC BLOOD PRESSURE: 113 MMHG | BODY MASS INDEX: 29.27 KG/M2 | WEIGHT: 155 LBS | DIASTOLIC BLOOD PRESSURE: 73 MMHG | HEIGHT: 61 IN

## 2021-12-02 PROCEDURE — 99213 OFFICE O/P EST LOW 20 MIN: CPT

## 2021-12-02 NOTE — HISTORY OF PRESENT ILLNESS
[de-identified] : 12/2/21: Sara is a pleasant 61-year-old right-hand-dominant female who returns the office today for follow-up regarding her left shoulder pain secondary to rotator cuff tear.  We recently repeated an MRI and she comes in to discuss those results with me today and have a surgical discussion.  Her pain is worse since her previous visit.  She denies any fevers, chills, sweats, or pain elsewhere.\par \par 10/1/21: Sara is a pleasant 61-year-old right-hand-dominant female who returns the office today for follow-up regarding her left shoulder pain secondary to a rotator cuff tear.  The patient states that her pain is worse since her previous visit.  On her last office visit we did discuss surgical intervention for this which she is interested in.  She comes in today for further surgical discussion.  She denies any fevers, chills, sweats, numbness, tingling, or pain elsewhere.\par \par 3/19/21: Sara is a pleasant 61 year old female HD[R] who is being seen for follow-up for let shoulder pain.  The patient states that her symptoms are unchanged since her previous visit.  She has been taking anti-inflammatories as needed for pain.  She recently had an MRI of the shoulder and comes in to discuss those results with me today and any further recommendations.  She denies any fevers, chills, sweats, numbness, tingling, or pain elsewhere.

## 2021-12-02 NOTE — REASON FOR VISIT
[Follow-Up Visit] : a follow-up visit for [Shoulder Pain] : shoulder pain [FreeTextEntry2] : MRI results

## 2021-12-02 NOTE — DISCUSSION/SUMMARY
[de-identified] : Assessment: 61-year-old female with a left shoulder rotator cuff tear\par \par Plan: I had a long discussion with the patient today regarding the nature of their diagnosis and treatment plan.  We discussed the risks and benefits of no treatment as well as nonoperative and operative treatments.  I reviewed the patient's repeat MRI today with her in the office which demonstrates minimal change since the previous film with the exception of some granulation tissue around her full-thickness tear.  There remains subacromial impingement with associated bursitis.  There is degenerative tearing of the labrum with synovitis.  There is also tenosynovitis of the long head of the biceps tendon. The patient has failed conservative treatment measures including rest, ice, heat, anti-inflammatory medications, and activity modifications.  They continue to have significant pain and functional limitations which interfere with their daily activities. At this time they would like to consider operative intervention as a more definitive treatment option for their symptoms.  Surgical treatment would include a left shoulder arthroscopic rotator cuff repair with subacromial decompression, synovectomy, and possible open subpectoral biceps tenodesis.  The risks and benefits of surgery were discussed in detail.  The benefits include improved shoulder pain and function.  The risks include but are not limited to infection, blood loss, blood clots, neurovascular injury, stiffness/loss of range of motion, fracture, failure of repair, persistent pain, anesthesia related complications including paralysis and death, and the need for further surgery in the future.  I explained to the patient that they will be nonweightbearing of their operative extremity in a shoulder immobilizer for a minimum of 6 weeks postoperatively.  They will not be permitted to drive while wearing the sling and/or while taking narcotic pain medications.  I also informed the patient that they will need to actively participate in physical therapy 2-3 days a week for a minimum of 4 to 6 months postoperatively in order to optimize their surgical outcome.  I expect most patients to make a full recovery at an average of 6 months postoperatively, but some patients take longer to recover and can continue to make improvements up to 1 year after surgery.  The patient verbalizes their understanding of all risks and comprehends the post-operative course and plan of care.  She will speak with my surgical coordinator regarding booking the surgery and presurgical testing.\par \par She verbalizes her understanding of this.  All questions were answered to her satisfaction.

## 2021-12-02 NOTE — PHYSICAL EXAM
[de-identified] : General:\par Awake, alert, no acute distress, Patient was cooperative and appropriate during the examination.\par \par The patient is of normal weight for height and age.\par \par Walks without an antalgic gait.\par \par Full, painless range of motion of the neck and back.\par \par Exam of the bilateral lower extremities is intact and symmetric with regards to dermatologic, vascular, and neurologic exam. Bilateral lower extremity sensation is grossly intact to light touch in the DP/SP/T/S/S nerve distributions. Intact DF/PF/EHL. BIlateral lower extremities warm and well-perfused with brisk capillary refill.\par \par \par Pulmonary:\par Regular, nonlabored breathing\par \par Abdomen:\par Soft, nontender, nondistended.\par \par Lymphatic:\par No evidence of axillary lymphadenopathy\par \par Left shoulder Exam:\par Physical exam of the shoulder demonstrates normal skin without signs of skin changes or abnormalities. No erythema, warmth, or joint effusion appreciated.  \par \par Sensation intact light touch C5-T1\par Palpable radial pulse\par Radial/ulnar/median/axillary/musculocutaneous/AIN/PIN nerves grossly intact\par \par Range of motion:\par Forward Flexion: 170\par Abduction: 150\par External Rotation: 45\par Internal Rotation: T12\par \par Palpation:\par Not tender to palpation over the glenohumeral joint\par Moderately tender palpation over the rotator cuff insertion on the greater tuberosity\par Mildly tender to palpation over the AC joint\par Mildly tender to palpation of the biceps tendon/bicipital groove\par \par Strength testing:\par Supraspinatus: 4/5\par Infraspinatus: 4+/5\par Subscapularis: 5/5\par \par Special test:\par Empty can test positive\par Velez impingement test positive\par Speeds test positive\par Oliver's test negative\par Lift-off test negative\par Bell-press test negative\par Cross-arm adduction test negative\par \par  [de-identified] : MRI of the left shoulder from a [facility] imaging facility on 11/18/2021 was reviewed the patient today in the office.  There is a redemonstration of severe supraspinatus tendinosis with a focal full-thickness tear of the anterior insertional fibers of the supraspinatus.  There appears to be some new granulation tissue in the tear gap compared to prior, however, it is otherwise unchanged.  There is moderate to large amounts of fluid within the subacromial and subdeltoid bursa with associated impingement, increased from prior.\par \par MRI of the left shoulder from a [facility] imaging facility on 3/4/2021 was reviewed with the patient today in the office.  The patient has severe tendinosis of the supraspinatus with a focal full-thickness tear of the anteriormost insertional fibers.  There is prominent osseous edema within this adjacent anterior portion of the greater tuberosity which is likely related to an osseous stress reaction or enthesopathic change.  There is moderate subacromial and subdeltoid bursitis with associated impingement.  There is degeneration of the glenoid labrum with undersurface fraying along the chondral labral junction of the superior labrum and associated synovitis.  There is also mild to moderate tenosynovitis of the long head of the biceps tendon.

## 2022-01-04 ENCOUNTER — OUTPATIENT (OUTPATIENT)
Dept: OUTPATIENT SERVICES | Facility: HOSPITAL | Age: 62
LOS: 1 days | End: 2022-01-04
Payer: MEDICARE

## 2022-01-04 DIAGNOSIS — Z98.89 OTHER SPECIFIED POSTPROCEDURAL STATES: Chronic | ICD-10-CM

## 2022-01-04 DIAGNOSIS — Z96.651 PRESENCE OF RIGHT ARTIFICIAL KNEE JOINT: Chronic | ICD-10-CM

## 2022-01-04 DIAGNOSIS — S82.209P UNSPECIFIED FRACTURE OF SHAFT OF UNSPECIFIED TIBIA, SUBSEQUENT ENCOUNTER FOR CLOSED FRACTURE WITH MALUNION: Chronic | ICD-10-CM

## 2022-01-04 DIAGNOSIS — K46.9 UNSPECIFIED ABDOMINAL HERNIA WITHOUT OBSTRUCTION OR GANGRENE: Chronic | ICD-10-CM

## 2022-01-04 DIAGNOSIS — Z01.818 ENCOUNTER FOR OTHER PREPROCEDURAL EXAMINATION: ICD-10-CM

## 2022-01-04 DIAGNOSIS — Z90.49 ACQUIRED ABSENCE OF OTHER SPECIFIED PARTS OF DIGESTIVE TRACT: Chronic | ICD-10-CM

## 2022-01-04 LAB
A1C WITH ESTIMATED AVERAGE GLUCOSE RESULT: 5.2 % — SIGNIFICANT CHANGE UP (ref 4–5.6)
ALBUMIN SERPL ELPH-MCNC: 3.9 G/DL — SIGNIFICANT CHANGE UP (ref 3.3–5.2)
ALP SERPL-CCNC: 156 U/L — HIGH (ref 40–120)
ALT FLD-CCNC: 9 U/L — SIGNIFICANT CHANGE UP
ANION GAP SERPL CALC-SCNC: 12 MMOL/L — SIGNIFICANT CHANGE UP (ref 5–17)
AST SERPL-CCNC: 17 U/L — SIGNIFICANT CHANGE UP
BASOPHILS # BLD AUTO: 0.05 K/UL — SIGNIFICANT CHANGE UP (ref 0–0.2)
BASOPHILS NFR BLD AUTO: 1 % — SIGNIFICANT CHANGE UP (ref 0–2)
BILIRUB SERPL-MCNC: <0.2 MG/DL — LOW (ref 0.4–2)
BUN SERPL-MCNC: 17.4 MG/DL — SIGNIFICANT CHANGE UP (ref 8–20)
CALCIUM SERPL-MCNC: 8.9 MG/DL — SIGNIFICANT CHANGE UP (ref 8.6–10.2)
CHLORIDE SERPL-SCNC: 106 MMOL/L — SIGNIFICANT CHANGE UP (ref 98–107)
CO2 SERPL-SCNC: 22 MMOL/L — SIGNIFICANT CHANGE UP (ref 22–29)
CREAT SERPL-MCNC: 0.53 MG/DL — SIGNIFICANT CHANGE UP (ref 0.5–1.3)
EOSINOPHIL # BLD AUTO: 0.06 K/UL — SIGNIFICANT CHANGE UP (ref 0–0.5)
EOSINOPHIL NFR BLD AUTO: 1.2 % — SIGNIFICANT CHANGE UP (ref 0–6)
ESTIMATED AVERAGE GLUCOSE: 103 MG/DL — SIGNIFICANT CHANGE UP (ref 68–114)
GLUCOSE SERPL-MCNC: 105 MG/DL — HIGH (ref 70–99)
HCT VFR BLD CALC: 35.6 % — SIGNIFICANT CHANGE UP (ref 34.5–45)
HGB BLD-MCNC: 11.2 G/DL — LOW (ref 11.5–15.5)
IMM GRANULOCYTES NFR BLD AUTO: 0.2 % — SIGNIFICANT CHANGE UP (ref 0–1.5)
LYMPHOCYTES # BLD AUTO: 1.76 K/UL — SIGNIFICANT CHANGE UP (ref 1–3.3)
LYMPHOCYTES # BLD AUTO: 34.5 % — SIGNIFICANT CHANGE UP (ref 13–44)
MCHC RBC-ENTMCNC: 26 PG — LOW (ref 27–34)
MCHC RBC-ENTMCNC: 31.5 GM/DL — LOW (ref 32–36)
MCV RBC AUTO: 82.6 FL — SIGNIFICANT CHANGE UP (ref 80–100)
MONOCYTES # BLD AUTO: 0.43 K/UL — SIGNIFICANT CHANGE UP (ref 0–0.9)
MONOCYTES NFR BLD AUTO: 8.4 % — SIGNIFICANT CHANGE UP (ref 2–14)
NEUTROPHILS # BLD AUTO: 2.79 K/UL — SIGNIFICANT CHANGE UP (ref 1.8–7.4)
NEUTROPHILS NFR BLD AUTO: 54.7 % — SIGNIFICANT CHANGE UP (ref 43–77)
PLATELET # BLD AUTO: 329 K/UL — SIGNIFICANT CHANGE UP (ref 150–400)
POTASSIUM SERPL-MCNC: 4.4 MMOL/L — SIGNIFICANT CHANGE UP (ref 3.5–5.3)
POTASSIUM SERPL-SCNC: 4.4 MMOL/L — SIGNIFICANT CHANGE UP (ref 3.5–5.3)
PROT SERPL-MCNC: 6.3 G/DL — LOW (ref 6.6–8.7)
RBC # BLD: 4.31 M/UL — SIGNIFICANT CHANGE UP (ref 3.8–5.2)
RBC # FLD: 13.2 % — SIGNIFICANT CHANGE UP (ref 10.3–14.5)
SODIUM SERPL-SCNC: 140 MMOL/L — SIGNIFICANT CHANGE UP (ref 135–145)
WBC # BLD: 5.1 K/UL — SIGNIFICANT CHANGE UP (ref 3.8–10.5)
WBC # FLD AUTO: 5.1 K/UL — SIGNIFICANT CHANGE UP (ref 3.8–10.5)

## 2022-01-04 PROCEDURE — 93005 ELECTROCARDIOGRAM TRACING: CPT

## 2022-01-04 PROCEDURE — G0463: CPT

## 2022-01-04 PROCEDURE — 93010 ELECTROCARDIOGRAM REPORT: CPT

## 2022-01-04 PROCEDURE — 85025 COMPLETE CBC W/AUTO DIFF WBC: CPT

## 2022-01-04 PROCEDURE — 80053 COMPREHEN METABOLIC PANEL: CPT

## 2022-01-04 PROCEDURE — 83036 HEMOGLOBIN GLYCOSYLATED A1C: CPT

## 2022-01-04 PROCEDURE — 36415 COLL VENOUS BLD VENIPUNCTURE: CPT

## 2022-01-18 ENCOUNTER — APPOINTMENT (OUTPATIENT)
Dept: ORTHOPEDIC SURGERY | Facility: AMBULATORY SURGERY CENTER | Age: 62
End: 2022-01-18
Payer: MEDICARE

## 2022-01-18 PROCEDURE — 29826 SHO ARTHRS SRG DECOMPRESSION: CPT | Mod: LT

## 2022-01-18 PROCEDURE — 29827 SHO ARTHRS SRG RT8TR CUF RPR: CPT | Mod: LT

## 2022-01-18 PROCEDURE — 23430 REPAIR BICEPS TENDON: CPT | Mod: LT

## 2022-02-01 ENCOUNTER — APPOINTMENT (OUTPATIENT)
Dept: ORTHOPEDIC SURGERY | Facility: CLINIC | Age: 62
End: 2022-02-01
Payer: MEDICARE

## 2022-02-01 PROCEDURE — 99024 POSTOP FOLLOW-UP VISIT: CPT

## 2022-02-01 NOTE — HISTORY OF PRESENT ILLNESS
[de-identified] : Status post left shoulder arthroscopic rotator cuff repair, subacromial decompression, synovectomy, and open subpectoral biceps tenodesis on 1/18/2022 [de-identified] : Sara is a pleasant 61-year-old female returns to the office today status post left shoulder arthroscopic rotator cuff repair on 1/18/2022. The patient reports improvements in her pain and swelling since the day of surgery. She has remained nonweightbearing of her left upper extremity in her sling. She has been doing gentle pendulum exercises as well as elbow, hand, and wrist motion exercises. She has been icing the shoulder for swelling and taking Tylenol for pain. She has yet to start with physical therapy. She reports today for routine evaluation. She denies any fevers, chills, sweats, redness, warmth, drainage, numbness, tingling, or pain elsewhere. [de-identified] : On exam, the patient is pleasant.  They  are awake, alert, and oriented x3.  The patient walks into my office without an antalgic gait.\par Weight is appropriate for height\par Full range of motion of cervical spine without instability\par Full range of motion of back without instability\par Intact neurologic, vascular, and dermatologic exam to right and left upper extremities\par Intact neurologic, vascular, and dermatologic exam to right and left lower extremities\par \par Left upper Extremity\par The patient's incisions are well approximated with intact sutures. The portal sites were cleaned with alcohol and the sutures were removed. Steri-Strips were applied. The patient tolerated this well.  No erythema, warmth, or signs of infection. Mild beni-incisional tenderness. No bony tenderness to palpation about the shoulder. PROM: Not tested today. Strength not tested today.  AIN/PIN/radial/ulnar/median/musculocutaneous/axillary motor function is intact, sensations intact light touch in C5-T1 distributions, radial pulses 2+, compartments are soft and compressible. [de-identified] : 61-year-old female status post left shoulder arthroscopic rotator cuff repair, subacromial decompression, synovectomy, and open subpectoral biceps tenodesis on 1/18/2022 [de-identified] : Sara is recovering well from her recent left shoulder arthroscopic rotator cuff repair. She has had improvements in her pain and swelling. At this point she will remain nonweightbearing in her sling full-time. She may remove the sling periodically to continue to do pendulum exercises as well as gentle elbow, hand, and wrist motion exercises. She will avoid active elbow flexion for 2 more weeks to protect the biceps tenodesis. A referral for physical therapy was provided to begin working on passive range of motion exercises. She may continue with Tylenol for pain and icing the shoulder for swelling. Recommend follow-up in 4 weeks for repeat evaluation. The patient verbalizes her understanding and agrees with the plan. All questions were answered to her satisfaction.

## 2022-02-18 NOTE — H&P PST ADULT - PSH
Abdominal hernia    Closed fracture of tibia with malunion  s/p surgical repair; hardware removed 11/2016  H/O excision of mass  cysts right hand, R shoulder, L leg, R leg  History of cholecystectomy    History of colon surgery    S/P arthroscopic knee surgery  bilat  S/P carpal tunnel release  bilat  Status post left breast lumpectomy
no fever/no night sweats/no weight loss

## 2022-03-10 ENCOUNTER — APPOINTMENT (OUTPATIENT)
Dept: ORTHOPEDIC SURGERY | Facility: CLINIC | Age: 62
End: 2022-03-10
Payer: MEDICARE

## 2022-03-10 PROCEDURE — 99024 POSTOP FOLLOW-UP VISIT: CPT

## 2022-03-10 NOTE — HISTORY OF PRESENT ILLNESS
[Shoulder Pain] : Shoulder Pain [de-identified] : Status post left shoulder arthroscopic rotator cuff repair, subacromial decompression, synovectomy, and open subpectoral biceps tenodesis on 1/18/2022 [de-identified] : Sara is a pleasant 62-year-old female returns to the office today status post left shoulder arthroscopic rotator cuff repair on 1/18/2022. The patient reports improvements in her pain and swelling since the day of surgery. She has remained nonweightbearing of her left upper extremity in her sling. She has been doing gentle pendulum exercises as well as elbow, hand, and wrist motion exercises. She has been icing the shoulder for swelling and taking Tylenol for pain.  She has been going to physical therapy at least twice per week and reports improvements in her motion.  She reports today for routine evaluation. She denies any fevers, chills, sweats, redness, warmth, drainage, numbness, tingling, or pain elsewhere. [de-identified] : On exam, the patient is pleasant.  They  are awake, alert, and oriented x3.  The patient walks into my office without an antalgic gait.\par Weight is appropriate for height\par Full range of motion of cervical spine without instability\par Full range of motion of back without instability\par Intact neurologic, vascular, and dermatologic exam to right and left upper extremities\par Intact neurologic, vascular, and dermatologic exam to right and left lower extremities\par \par Left upper Extremity\par The patient's incisions are well-healed.  No erythema, warmth, or signs of infection. Mild beni-incisional tenderness. No bony tenderness to palpation about the shoulder. PROM: Forward flexion to 160, external rotation to 30, internal rotation L5. Strength not tested today.  AIN/PIN/radial/ulnar/median/musculocutaneous/axillary motor function is intact, sensations intact light touch in C5-T1 distributions, radial pulses 2+, compartments are soft and compressible. [de-identified] : 62-year-old female status post left shoulder arthroscopic rotator cuff repair, subacromial decompression, synovectomy, and open subpectoral biceps tenodesis on 1/18/2022 [de-identified] : Sara is recovering well from her recent left shoulder arthroscopic rotator cuff repair. She has had improvements in her pain and swelling.  She may now wean out of her sling but will remain nonweightbearing of her left shoulder.  She may begin active forward elevation with her hands in front of her but will avoid active abduction of the shoulder or any other forced movements.  She will continue with physical therapy to advance her protocol.  She may continue to ice the shoulder as needed for swelling.  New prescription for oxycodone was provided today to help her sleep and participate in physical therapy.  She knows not to drive while taking narcotic medications.  Recommend follow-up in 6 weeks for repeat evaluation.  She verbalizes her understanding and agrees with plan.  All questions were answered to her satisfaction.

## 2022-04-05 ENCOUNTER — APPOINTMENT (OUTPATIENT)
Dept: OBGYN | Facility: CLINIC | Age: 62
End: 2022-04-05
Payer: MEDICARE

## 2022-04-05 ENCOUNTER — NON-APPOINTMENT (OUTPATIENT)
Age: 62
End: 2022-04-05

## 2022-04-05 VITALS
WEIGHT: 155 LBS | DIASTOLIC BLOOD PRESSURE: 74 MMHG | BODY MASS INDEX: 29.27 KG/M2 | HEIGHT: 61 IN | SYSTOLIC BLOOD PRESSURE: 118 MMHG

## 2022-04-05 DIAGNOSIS — C18.9 MALIGNANT NEOPLASM OF COLON, UNSPECIFIED: ICD-10-CM

## 2022-04-05 DIAGNOSIS — Z12.39 ENCOUNTER FOR OTHER SCREENING FOR MALIGNANT NEOPLASM OF BREAST: ICD-10-CM

## 2022-04-05 DIAGNOSIS — Z12.4 ENCOUNTER FOR SCREENING FOR MALIGNANT NEOPLASM OF CERVIX: ICD-10-CM

## 2022-04-05 PROCEDURE — 99396 PREV VISIT EST AGE 40-64: CPT | Mod: GY

## 2022-04-05 PROCEDURE — G0101: CPT

## 2022-04-05 RX ORDER — PREGABALIN 75 MG/1
75 CAPSULE ORAL
Qty: 90 | Refills: 0 | Status: DISCONTINUED | COMMUNITY
Start: 2020-12-21 | End: 2022-04-05

## 2022-04-05 RX ORDER — OXYCODONE 5 MG/1
5 TABLET ORAL
Qty: 24 | Refills: 0 | Status: DISCONTINUED | COMMUNITY
Start: 2022-01-18 | End: 2022-04-05

## 2022-04-05 RX ORDER — PIOGLITAZONE HYDROCHLORIDE 15 MG/1
15 TABLET ORAL
Refills: 0 | Status: DISCONTINUED | COMMUNITY
End: 2022-04-05

## 2022-04-05 RX ORDER — VALACYCLOVIR 500 MG/1
500 TABLET, FILM COATED ORAL
Qty: 90 | Refills: 0 | Status: DISCONTINUED | COMMUNITY
Start: 2019-11-13 | End: 2022-04-05

## 2022-04-05 RX ORDER — ZINC SULFATE 50(220)MG
50 CAPSULE ORAL
Refills: 0 | Status: DISCONTINUED | COMMUNITY
End: 2022-04-05

## 2022-04-05 RX ORDER — OXYCODONE 5 MG/1
5 TABLET ORAL
Qty: 30 | Refills: 0 | Status: DISCONTINUED | COMMUNITY
Start: 2022-02-01 | End: 2022-04-05

## 2022-04-05 RX ORDER — OXYCODONE 5 MG/1
5 TABLET ORAL
Qty: 20 | Refills: 0 | Status: DISCONTINUED | COMMUNITY
Start: 2022-03-10 | End: 2022-04-05

## 2022-04-05 RX ORDER — ANASTROZOLE TABLETS 1 MG/1
1 TABLET ORAL
Refills: 0 | Status: ACTIVE | COMMUNITY

## 2022-04-07 LAB — HPV HIGH+LOW RISK DNA PNL CVX: NOT DETECTED

## 2022-04-09 NOTE — PHYSICAL EXAM
[Chaperone Present] : A chaperone was present in the examining room during all aspects of the physical examination [Appropriately responsive] : appropriately responsive [Alert] : alert [No Acute Distress] : no acute distress [Soft] : soft [Non-tender] : non-tender [Non-distended] : non-distended [Oriented x3] : oriented x3 [Examination Of The Breasts] : a normal appearance [No Discharge] : no discharge [No Masses] : no breast masses were palpable [Labia Majora] : normal [Labia Minora] : normal [Normal] : normal [Uterine Adnexae] : normal [FreeTextEntry1] : DENG Antony  [Atrophy] : atrophy [Dry Mucosa] : dry mucosa

## 2022-04-09 NOTE — DISCUSSION/SUMMARY
[FreeTextEntry1] : Pap done today\par Prescription for mammogram screening and breast US given. She follows up with  for breast care. \par Self-breast exam reviewed. \par TVUS ordered secondary to being  high risk of ovarian cancer. She understands the limitations of son in detecting ovarian cancer.\par She will follow up annually or as needed.\par \par All questions and concerns were discussed. \par

## 2022-04-09 NOTE — END OF VISIT
[FreeTextEntry3] : I, Mila Martino solely acted as a scribe on behalf of  on 04/05/2022. All medical entries made by the scribe were at my, Dr. Lai, direction and personally dictated by me on 04/05/2022 . I have reviewed the chart and agree that the record accurately reflects my personal performance of the history, physical exam, assessment and plan. I have also personally directed, reviewed and agreed with the chart.\par

## 2022-04-09 NOTE — HISTORY OF PRESENT ILLNESS
[postmenopausal] : postmenopausal [N] : Patient does not use contraception [Y] : Positive pregnancy history [Menarche Age: ____] : age at menarche was [unfilled] [Menopause Age: ____] : age at menopause was [unfilled] [No] : Patient does not have concerns regarding sex [Currently Active] : currently active [Mammogramdate] : 05/03/21 [TextBox_19] : BR0 [BreastSonogramDate] : 05/03/21 [TextBox_25] : BR0 [PapSmeardate] : 01/13/20 [BoneDensityDate] : 09/16/19 [TextBox_31] : NEG [TextBox_37] : OSTEOPENIA [ColonoscopyDate] : 2020 [HPVDate] : 01/13/20 [LMPDate] : 2007 [PGHxTotal] : 2 [[facility]Living] : 2 [[facility]FullTerm] : 2 [FreeTextEntry1] : 2007

## 2022-04-11 LAB — CYTOLOGY CVX/VAG DOC THIN PREP: ABNORMAL

## 2022-04-18 ENCOUNTER — APPOINTMENT (OUTPATIENT)
Dept: RADIOLOGY | Facility: CLINIC | Age: 62
End: 2022-04-18
Payer: MEDICARE

## 2022-04-18 ENCOUNTER — OUTPATIENT (OUTPATIENT)
Dept: OUTPATIENT SERVICES | Facility: HOSPITAL | Age: 62
LOS: 1 days | End: 2022-04-18
Payer: MEDICARE

## 2022-04-18 DIAGNOSIS — K46.9 UNSPECIFIED ABDOMINAL HERNIA WITHOUT OBSTRUCTION OR GANGRENE: Chronic | ICD-10-CM

## 2022-04-18 DIAGNOSIS — Z98.89 OTHER SPECIFIED POSTPROCEDURAL STATES: Chronic | ICD-10-CM

## 2022-04-18 DIAGNOSIS — S82.209P UNSPECIFIED FRACTURE OF SHAFT OF UNSPECIFIED TIBIA, SUBSEQUENT ENCOUNTER FOR CLOSED FRACTURE WITH MALUNION: Chronic | ICD-10-CM

## 2022-04-18 DIAGNOSIS — Z12.4 ENCOUNTER FOR SCREENING FOR MALIGNANT NEOPLASM OF CERVIX: ICD-10-CM

## 2022-04-18 DIAGNOSIS — Z96.651 PRESENCE OF RIGHT ARTIFICIAL KNEE JOINT: Chronic | ICD-10-CM

## 2022-04-18 DIAGNOSIS — Z90.49 ACQUIRED ABSENCE OF OTHER SPECIFIED PARTS OF DIGESTIVE TRACT: Chronic | ICD-10-CM

## 2022-04-18 PROCEDURE — 77085 DXA BONE DENSITY AXL VRT FX: CPT

## 2022-04-18 PROCEDURE — 77085 DXA BONE DENSITY AXL VRT FX: CPT | Mod: 26

## 2022-04-28 ENCOUNTER — TRANSCRIPTION ENCOUNTER (OUTPATIENT)
Age: 62
End: 2022-04-28

## 2022-05-09 ENCOUNTER — APPOINTMENT (OUTPATIENT)
Dept: ANTEPARTUM | Facility: CLINIC | Age: 62
End: 2022-05-09
Payer: MEDICARE

## 2022-05-09 ENCOUNTER — APPOINTMENT (OUTPATIENT)
Dept: OBGYN | Facility: CLINIC | Age: 62
End: 2022-05-09
Payer: MEDICARE

## 2022-05-09 ENCOUNTER — ASOB RESULT (OUTPATIENT)
Age: 62
End: 2022-05-09

## 2022-05-09 VITALS
TEMPERATURE: 97.1 F | HEIGHT: 61 IN | DIASTOLIC BLOOD PRESSURE: 80 MMHG | BODY MASS INDEX: 29.27 KG/M2 | WEIGHT: 155 LBS | SYSTOLIC BLOOD PRESSURE: 128 MMHG

## 2022-05-09 PROCEDURE — 99213 OFFICE O/P EST LOW 20 MIN: CPT

## 2022-05-09 PROCEDURE — 76830 TRANSVAGINAL US NON-OB: CPT

## 2022-05-15 NOTE — END OF VISIT
[FreeTextEntry3] : I, Wm Johnson solely acted as scribe for Dr. Harmony Lai on 05/09/2022 \par All medical entries made by the Scribe were at my, Dr. Lai’s, direction and personally\par dictated by me on 05/09/2022 . I have reviewed the chart and agree that the record\par accurately reflects my personal performance of the history, physical exam, assessment and plan. I\par have also personally directed, reviewed, and agreed with the chart.

## 2022-05-15 NOTE — DISCUSSION/SUMMARY
[FreeTextEntry1] : Patient aware to perform another transvaginal sonogram in six months to follow up on ovarian cyst. Prescription for a transvaginal sonogram given. \par \par She will follow up for pelvic sonogram and as needed.

## 2022-05-15 NOTE — HISTORY OF PRESENT ILLNESS
[postmenopausal] : postmenopausal [N] : Patient does not use contraception [Y] : Positive pregnancy history [Menarche Age: ____] : age at menarche was [unfilled] [Currently Active] : currently active [Mammogramdate] : 05/07/21 [TextBox_19] : BR4B [BreastSonogramDate] : 05/03/21 [TextBox_25] : BR0 [PapSmeardate] : 04/05/22 [TextBox_31] : NEG [HPVDate] : 04/05/22 [LMPDate] : 2007 [PGHxTotal] : 2 [[facility]] : 2 [PGxPremature] : 2 [[facility] Hospital[facility]] : 2 [FreeTextEntry1] : 2007

## 2022-05-24 ENCOUNTER — OUTPATIENT (OUTPATIENT)
Dept: OUTPATIENT SERVICES | Facility: HOSPITAL | Age: 62
LOS: 1 days | End: 2022-05-24
Payer: MEDICARE

## 2022-05-24 ENCOUNTER — APPOINTMENT (OUTPATIENT)
Dept: ORTHOPEDIC SURGERY | Facility: CLINIC | Age: 62
End: 2022-05-24
Payer: MEDICARE

## 2022-05-24 ENCOUNTER — APPOINTMENT (OUTPATIENT)
Dept: MRI IMAGING | Facility: CLINIC | Age: 62
End: 2022-05-24
Payer: MEDICARE

## 2022-05-24 VITALS
BODY MASS INDEX: 29.27 KG/M2 | WEIGHT: 155 LBS | HEIGHT: 61 IN | SYSTOLIC BLOOD PRESSURE: 126 MMHG | HEART RATE: 60 BPM | DIASTOLIC BLOOD PRESSURE: 74 MMHG

## 2022-05-24 DIAGNOSIS — Z96.651 PRESENCE OF RIGHT ARTIFICIAL KNEE JOINT: Chronic | ICD-10-CM

## 2022-05-24 DIAGNOSIS — M54.16 RADICULOPATHY, LUMBAR REGION: ICD-10-CM

## 2022-05-24 DIAGNOSIS — Z98.89 OTHER SPECIFIED POSTPROCEDURAL STATES: Chronic | ICD-10-CM

## 2022-05-24 DIAGNOSIS — Z90.49 ACQUIRED ABSENCE OF OTHER SPECIFIED PARTS OF DIGESTIVE TRACT: Chronic | ICD-10-CM

## 2022-05-24 DIAGNOSIS — M48.062 SPINAL STENOSIS, LUMBAR REGION WITH NEUROGENIC CLAUDICATION: ICD-10-CM

## 2022-05-24 DIAGNOSIS — K46.9 UNSPECIFIED ABDOMINAL HERNIA WITHOUT OBSTRUCTION OR GANGRENE: Chronic | ICD-10-CM

## 2022-05-24 DIAGNOSIS — S82.209P UNSPECIFIED FRACTURE OF SHAFT OF UNSPECIFIED TIBIA, SUBSEQUENT ENCOUNTER FOR CLOSED FRACTURE WITH MALUNION: Chronic | ICD-10-CM

## 2022-05-24 PROCEDURE — G1004: CPT

## 2022-05-24 PROCEDURE — 72148 MRI LUMBAR SPINE W/O DYE: CPT | Mod: 26,ME

## 2022-05-24 PROCEDURE — 72148 MRI LUMBAR SPINE W/O DYE: CPT | Mod: ME

## 2022-05-24 PROCEDURE — 99214 OFFICE O/P EST MOD 30 MIN: CPT

## 2022-05-24 PROCEDURE — 72100 X-RAY EXAM L-S SPINE 2/3 VWS: CPT

## 2022-05-24 NOTE — ADDENDUM
[FreeTextEntry1] : Documented by Jerry Persaud acting as a scribe for Dr. Michele Cobian on 05/24/2022. All medical record entries made by the Scribe were at my, Dr. Michele Cobian, direction and personally dictated by me on 05/24/2022 . I have reviewed the chart and agree that the record accurately reflects my personal performance of the history, physical exam, assessment and plan. I have also personally directed, reviewed, and agreed with the chart.

## 2022-05-24 NOTE — DISCUSSION/SUMMARY
[Medication Risks Reviewed] : Medication risks reviewed [de-identified] : We talked about the nature of the condition and treatment options. Anticipatory guidance regarding neurogenic claudication was given. I will provide a prescription for Medrol dose pack for pain relief, patient was educated on the antiinflammatory properties of this medication and how this will help their pain. Patient was provided a Rx for Naproxen Sodium 500Mg BID. Patient has been referred to physical therapy for decreased pain modalities, stretching and strengthening modalities, soft tissue modalities, and physical modalities. A lumbar MRI has been ordered and is medically necessary due to persistent pain, worsening neurogenic claudication which is significantly impacting her quality of life, patient states she is having difficulty food shopping and preparing food for herself at this time, and to assess for surgical procedures secondary to lumbar stenosis. MRI will help guide treatment plan, possible surgical intervention vs injection therapy with pain management. The patient will follow up after the MRI results have been obtained. \par \par Prior to appointment and during encounter with patient extensive medical records were reviewed including but not limited to, hospital records, out patient records, imaging results, and lab data. During this appointment the patient was examined, diagnoses were discussed and explained in a face to face manner. In addition extensive time was spent reviewing aforementioned diagnostic studies. Counseling including abnormal image results, differential diagnoses, treatment options, risk and benefits, lifestyle changes, current condition, and current medications was performed. Patient's comments, questions, and concerns were address and patient verbalized understanding. Based on this patient's presentation at our office, which is an orthopedic spine surgeon's office, this patient inherently / intrinsically has a risk, however minute, of developing  issues such as Cauda equina syndrome, bowel and bladder changes, or progression of motor or neurological deficits such as paralysis which may be permanent.

## 2022-05-24 NOTE — HISTORY OF PRESENT ILLNESS
[de-identified] : 62 year old F presents for an initial evaluation of gradual onset lower back, buttock, and RLE pain. She is noticing pain into the left leg as well. Exacerbating factors include standing and walking. Alleviating factors include sitting and leaning forward. She needs a shopping cart when in the store so they can lean forward to relieve pain. No bowel or bladder changes. Motor skills intact.  [Ataxia] : no ataxia [Incontinence] : no incontinence [Loss of Dexterity] : good dexterity [Urinary Ret.] : no urinary retention

## 2022-05-24 NOTE — PHYSICAL EXAM
[Poor Appearance] : well-appearing [Acute Distress] : not in acute distress [Obese] : not obese [de-identified] : CONSTITUTIONAL: The patient is a very pleasant individual who is well-nourished and who appears stated age.\par PSYCHIATRIC: The patient is alert and oriented X 3 and in no apparent distress, and participates with orthopedic evaluation well.\par HEAD: Atraumatic and is nonsyndromic in appearance.\par EENT: No visible thyromegaly, EOMI.\par RESPIRATORY: Respiratory rate is regular, not dyspneic on examination.\par LYMPHATICS: There is no inguinal lymphadenopathy\par INTEGUMENTARY: Skin is clean, dry, and intact about the bilateral lower extremities and lumbar spine.\par VASCULAR: There is brisk capillary refill about the bilateral lower extremities.\par NEUROLOGIC: There are no pathologic reflexes. Signs and symptoms of neurogenic claudication. Deep tendon reflexes are well maintained at 2+/4 of the bilateral lower extremities and are symmetric.\par MUSCULOSKELETAL: There is no visible muscular atrophy. Manual motor strength is well maintained in the bilateral lower extremities.  Negative tension sign and straight leg raise bilaterally. Quad extension, ankle dorsiflexion, EHL, plantar flexion, and ankle eversion are well preserved. Normal secondary orthopaedic exam of bilateral hips, greater trochanteric area, knees and ankles. No pain with internal or external rotation of the bilateral hips.  [de-identified] : AP and Lateral views of the lumbar spine taken 05/24/2022 demonstrates lumbar degenerative disc disease mainly at L5-S1.

## 2022-06-03 ENCOUNTER — APPOINTMENT (OUTPATIENT)
Dept: ORTHOPEDIC SURGERY | Facility: CLINIC | Age: 62
End: 2022-06-03
Payer: MEDICARE

## 2022-06-03 VITALS
WEIGHT: 155 LBS | DIASTOLIC BLOOD PRESSURE: 57 MMHG | BODY MASS INDEX: 29.27 KG/M2 | HEART RATE: 66 BPM | HEIGHT: 61 IN | SYSTOLIC BLOOD PRESSURE: 101 MMHG

## 2022-06-03 DIAGNOSIS — M47.22 OTHER SPONDYLOSIS WITH MYELOPATHY, CERVICAL REGION: ICD-10-CM

## 2022-06-03 DIAGNOSIS — M47.12 OTHER SPONDYLOSIS WITH MYELOPATHY, CERVICAL REGION: ICD-10-CM

## 2022-06-03 DIAGNOSIS — M48.061 SPINAL STENOSIS, LUMBAR REGION WITHOUT NEUROGENIC CLAUDICATION: ICD-10-CM

## 2022-06-03 PROCEDURE — 99214 OFFICE O/P EST MOD 30 MIN: CPT

## 2022-06-03 NOTE — PHYSICAL EXAM
[Poor Appearance] : well-appearing [Acute Distress] : not in acute distress [Obese] : not obese [de-identified] : CONSTITUTIONAL: The patient is a very pleasant individual who is well-nourished and who appears stated age.\par PSYCHIATRIC: The patient is alert and oriented X 3 and in no apparent distress, and participates with orthopedic evaluation well.\par HEAD: Atraumatic and is nonsyndromic in appearance.\par EENT: No visible thyromegaly, EOMI.\par RESPIRATORY: Respiratory rate is regular, not dyspneic on examination.\par LYMPHATICS: There is no inguinal lymphadenopathy\par INTEGUMENTARY: Skin is clean, dry, and intact about the bilateral lower extremities and lumbar spine.\par VASCULAR: There is brisk capillary refill about the bilateral lower extremities.\par NEUROLOGIC: There are no pathologic reflexes. Signs and symptoms of neurogenic claudication. Deep tendon reflexes are well maintained at 2+/4 of the bilateral lower extremities and are symmetric.\par MUSCULOSKELETAL: There is no visible muscular atrophy. Manual motor strength is well maintained in the bilateral lower extremities.  Negative tension sign and straight leg raise bilaterally. Quad extension, ankle dorsiflexion, EHL, plantar flexion, and ankle eversion are well preserved. Normal secondary orthopaedic exam of greater trochanteric area, knees and ankles. + pain with internal and external rotation of the right hip, gluteal pain with hip flexion. Gluteal tendinopathy on the right.  [de-identified] : MRI of the lumbar spine taken at [facility] on 05- demonstrates mild right foraminal narrowing L3-L4 and L4-L5. There is left foraminal narrowing at L5-S1.\par \par AP and Lateral views of the lumbar spine taken 05/24/2022 demonstrates lumbar degenerative disc disease mainly at L5-S1. \par \par

## 2022-06-03 NOTE — ADDENDUM
[FreeTextEntry1] : Documented by Jerry Persaud acting as a scribe for Vanna Roth on 06/03/2022 . All medical record entries made by the Scribe were at my, Vanna Roth , direction and personally dictated by me on 06/03/2022 . I have reviewed the chart and agree that the record accurately reflects my personal performance of the history, physical exam, assessment and plan. I have also personally directed, reviewed, and agreed with the chart.

## 2022-06-03 NOTE — HISTORY OF PRESENT ILLNESS
[de-identified] : 62 year old F presents for interpretation of recent MRI results. She continues to complain of gradual onset lower back, buttock, right hip, and RLE pain. She is noticing pain into the left leg as well. Exacerbating factors include standing and walking. Alleviating factors include sitting and leaning forward. She needs a shopping cart when in the store so they can lean forward to relieve pain. She states she has significant pain in the right groin when crossing her right leg over her left to put a sock on. No bowel or bladder changes. Motor skills intact. She was provided medrol and Naproxen sodium on last visit.   [Ataxia] : no ataxia [Incontinence] : no incontinence [Loss of Dexterity] : good dexterity [Urinary Ret.] : no urinary retention

## 2022-06-03 NOTE — DISCUSSION/SUMMARY
[de-identified] : We talked about the nature of the condition and treatment options. Anticipatory guidance regarding foraminal narrowing was given. Patient has been referred to physical therapy for decreased pain modalities, stretching and strengthening modalities, soft tissue modalities, and physical modalities. Patient has been referred to pain management for assessment regarding pain control and epidurals as well as right intraarticular hip injection. Patient was instructed to start home exercises, core strengthening and a sheet was provided. I believe this patients pain to be multifactorial with both foraminal narrowing and hip degenerative joint disease playing a role. Patient referred to Dr. Heredia for hip evaluation\par tylenol 1000 mg every 6-8 hours prn mild to moderate pain.  Home exercises were given via BAILEY for her back and core, AAOS for hip.  \par \par Prior to appointment and during encounter with patient extensive medical records were reviewed including but not limited to, hospital records, out patient records, imaging results, and lab data. During this appointment the patient was examined, diagnoses were discussed and explained in a face to face manner. In addition extensive time was spent reviewing aforementioned diagnostic studies. Counseling including abnormal image results, differential diagnoses, treatment options, risk and benefits, lifestyle changes, current condition, and current medications was performed. Patient's comments, questions, and concerns were address and patient verbalized understanding. Based on this patient's presentation at our office, which is an orthopedic spine surgeon's office, this patient inherently / intrinsically has a risk, however minute, of developing  issues such as Cauda equina syndrome, bowel and bladder changes, or progression of motor or neurological deficits such as paralysis which may be permanent. \par \par We did discuss diskectomy surgery, however at this time I believe there is ample time for injection surgery. \par \par Anatomic models, Xrays, CT scans/MRI’s were utilized to provide a firm understanding of their surgical plan. Patient is aware that surgery is elective in nature. Risks, benefits, alternatives were discussed and all questions, comments and concerns were encouraged and answered to the patient's satisfaction. The statistical probability of improvement was discussed at length as well as post surgical course. Literature from [employer] spine society was provided to the patient regarding the specific type of surgery as well as a 5 page written surgical consent which the patient will need to sign and return to the office prior to surgical date. Consent forms highlight specific complications related to the complex nature of spinal surgery\par  \par Risks of lumbar surgery include: persistent pain, adjacent segment disease (which will require more surgery in future), dural tears, neurologic injury, and wound healing complications\par  \par Benefits of lumbar surgery include Improved neurologic and pain score\par  \par We also discussed with the patient complications of incisions directly related to obesity, diabetes, previous wound complications or post-surgical wound infections, smoking, neuropathy, and chronic anticoagulation. This risk has been specifically discussed and the patient will discuss modifiable risk factors to be optimized prior to surgical management. A multimodality approach of primary care physician, and medicine subspecialist will be utilized to optimize medical risk factors.\par  \par If patient is a smoker, discontinuation of smoking was advised and must be accomplished 6-8 weeks prior to surgery date. Patient was advised that help with quitting smoking is available through [employer] and phone number/website was provided, or patient can ask assistance from primary care provider. Elective surgery will not be performed unless patient complies with this policy.\par \par Medical comorbidities including but not limited to diabetes, coronary artery disease, renal insufficiency, uncontrolled hypertension, rheumatoid arthritis, auto-immune disorders, COPD/asthma and/or history of radiation, chemotherapy, being on anticoagulants, chronic steroids, immune modulators, have increased statistical chance of leading to increased hospital stay, protracted recovery period, need for acute or subacute rehabilitation post-operative. There can be increased probability of post-surgical and medical complications including but not limited to surgical site infection, need for revision surgery, deep vein thrombosis, pulmonary embolism, exacerbation of COPD/asthma, pneumonia, UTI, urinary retention, and ileus.

## 2022-06-17 ENCOUNTER — APPOINTMENT (OUTPATIENT)
Dept: ORTHOPEDIC SURGERY | Facility: CLINIC | Age: 62
End: 2022-06-17
Payer: MEDICARE

## 2022-06-17 VITALS
HEIGHT: 61 IN | SYSTOLIC BLOOD PRESSURE: 131 MMHG | HEART RATE: 68 BPM | DIASTOLIC BLOOD PRESSURE: 87 MMHG | WEIGHT: 155 LBS | BODY MASS INDEX: 29.27 KG/M2 | TEMPERATURE: 98.1 F

## 2022-06-17 PROCEDURE — 99213 OFFICE O/P EST LOW 20 MIN: CPT

## 2022-06-17 NOTE — HISTORY OF PRESENT ILLNESS
[Shoulder Pain] : Shoulder Pain [de-identified] : Status post left shoulder arthroscopic rotator cuff repair, subacromial decompression, synovectomy, and open subpectoral biceps tenodesis on 1/18/2022 [de-identified] : Sara is a pleasant 62-year-old female returns to the office today status post left shoulder arthroscopic rotator cuff repair on 1/18/2022. The patient reports improvements in her pain and swelling since the day of surgery. She has remained nonweightbearing of her left upper extremity in her sling. She has been doing gentle pendulum exercises as well as elbow, hand, and wrist motion exercises. She has been icing the shoulder for swelling and taking Tylenol for pain.  She has been going to physical therapy at least twice per week and reports improvements in her motion.  She reports today for routine evaluation. She denies any fevers, chills, sweats, redness, warmth, drainage, numbness, tingling, or pain elsewhere. [de-identified] : On exam, the patient is pleasant.  They  are awake, alert, and oriented x3.  The patient walks into my office without an antalgic gait.\par Weight is appropriate for height\par Full range of motion of cervical spine without instability\par Full range of motion of back without instability\par Intact neurologic, vascular, and dermatologic exam to right and left upper extremities\par Intact neurologic, vascular, and dermatologic exam to right and left lower extremities\par \par Left upper Extremity\par The patient's incisions are well-healed.  No erythema, warmth, or signs of infection.  No beni-incisional tenderness. No bony tenderness to palpation about the shoulder. PROM: Forward flexion to 180, external rotation to 45, internal rotation L3.  Supraspinatus 5/5, infraspinatus 5/5, subscapularis 5/5, biceps/triceps 5/5.  AIN/PIN/radial/ulnar/median/musculocutaneous/axillary motor function is intact, sensations intact light touch in C5-T1 distributions, radial pulses 2+, compartments are soft and compressible. [de-identified] : 62-year-old female status post left shoulder arthroscopic rotator cuff repair, subacromial decompression, synovectomy, and open subpectoral biceps tenodesis on 1/18/2022 [de-identified] : Sara has recovered very well from her surgery.  She has no pain and full range of motion.  She has 5/5 strength on examination.  She is only 5 months out from surgery.  I would encourage her to continue doing resistance exercises on her own several days a week which she has already been doing.  In 1 month may gradually return to her normal activities as tolerated without restriction.  Recommend follow-up in 7 months for repeat clinical evaluation.  He verbalizes understanding and agrees with the plan.  All questions were answered to her satisfaction.

## 2022-06-17 NOTE — HISTORY OF PRESENT ILLNESS
[Shoulder Pain] : Shoulder Pain [de-identified] : Status post left shoulder arthroscopic rotator cuff repair, subacromial decompression, synovectomy, and open subpectoral biceps tenodesis on 1/18/2022 [de-identified] : Sara is a pleasant 62-year-old female returns to the office today status post left shoulder arthroscopic rotator cuff repair on 1/18/2022. The patient reports improvements in her pain and swelling since the day of surgery. She has remained nonweightbearing of her left upper extremity in her sling. She has been doing gentle pendulum exercises as well as elbow, hand, and wrist motion exercises. She has been icing the shoulder for swelling and taking Tylenol for pain.  She has been going to physical therapy at least twice per week and reports improvements in her motion.  She reports today for routine evaluation. She denies any fevers, chills, sweats, redness, warmth, drainage, numbness, tingling, or pain elsewhere. [de-identified] : On exam, the patient is pleasant.  They  are awake, alert, and oriented x3.  The patient walks into my office without an antalgic gait.\par Weight is appropriate for height\par Full range of motion of cervical spine without instability\par Full range of motion of back without instability\par Intact neurologic, vascular, and dermatologic exam to right and left upper extremities\par Intact neurologic, vascular, and dermatologic exam to right and left lower extremities\par \par Left upper Extremity\par The patient's incisions are well-healed.  No erythema, warmth, or signs of infection.  No beni-incisional tenderness. No bony tenderness to palpation about the shoulder. PROM: Forward flexion to 180, external rotation to 45, internal rotation L3.  Supraspinatus 5/5, infraspinatus 5/5, subscapularis 5/5, biceps/triceps 5/5.  AIN/PIN/radial/ulnar/median/musculocutaneous/axillary motor function is intact, sensations intact light touch in C5-T1 distributions, radial pulses 2+, compartments are soft and compressible. [de-identified] : 62-year-old female status post left shoulder arthroscopic rotator cuff repair, subacromial decompression, synovectomy, and open subpectoral biceps tenodesis on 1/18/2022 [de-identified] : Sara has recovered very well from her surgery.  She has no pain and full range of motion.  She has 5/5 strength on examination.  She is only 5 months out from surgery.  I would encourage her to continue doing resistance exercises on her own several days a week which she has already been doing.  In 1 month may gradually return to her normal activities as tolerated without restriction.  Recommend follow-up in 7 months for repeat clinical evaluation.  He verbalizes understanding and agrees with the plan.  All questions were answered to her satisfaction.

## 2022-08-03 ENCOUNTER — OUTPATIENT (OUTPATIENT)
Dept: OUTPATIENT SERVICES | Facility: HOSPITAL | Age: 62
LOS: 1 days | End: 2022-08-03
Payer: MEDICARE

## 2022-08-03 ENCOUNTER — APPOINTMENT (OUTPATIENT)
Dept: CT IMAGING | Facility: CLINIC | Age: 62
End: 2022-08-03

## 2022-08-03 DIAGNOSIS — Z98.89 OTHER SPECIFIED POSTPROCEDURAL STATES: Chronic | ICD-10-CM

## 2022-08-03 DIAGNOSIS — Z96.651 PRESENCE OF RIGHT ARTIFICIAL KNEE JOINT: Chronic | ICD-10-CM

## 2022-08-03 DIAGNOSIS — S82.209P UNSPECIFIED FRACTURE OF SHAFT OF UNSPECIFIED TIBIA, SUBSEQUENT ENCOUNTER FOR CLOSED FRACTURE WITH MALUNION: Chronic | ICD-10-CM

## 2022-08-03 DIAGNOSIS — Z00.8 ENCOUNTER FOR OTHER GENERAL EXAMINATION: ICD-10-CM

## 2022-08-03 DIAGNOSIS — K46.9 UNSPECIFIED ABDOMINAL HERNIA WITHOUT OBSTRUCTION OR GANGRENE: Chronic | ICD-10-CM

## 2022-08-03 DIAGNOSIS — Z90.49 ACQUIRED ABSENCE OF OTHER SPECIFIED PARTS OF DIGESTIVE TRACT: Chronic | ICD-10-CM

## 2022-08-03 PROCEDURE — 75574 CT ANGIO HRT W/3D IMAGE: CPT | Mod: 26,MH

## 2022-08-03 PROCEDURE — 75574 CT ANGIO HRT W/3D IMAGE: CPT

## 2022-10-26 ENCOUNTER — APPOINTMENT (OUTPATIENT)
Dept: ORTHOPEDIC SURGERY | Facility: CLINIC | Age: 62
End: 2022-10-26

## 2022-10-26 VITALS
HEART RATE: 68 BPM | TEMPERATURE: 97.9 F | BODY MASS INDEX: 29.27 KG/M2 | SYSTOLIC BLOOD PRESSURE: 141 MMHG | WEIGHT: 155 LBS | HEIGHT: 61 IN | DIASTOLIC BLOOD PRESSURE: 84 MMHG

## 2022-10-26 DIAGNOSIS — Z00.00 ENCOUNTER FOR GENERAL ADULT MEDICAL EXAMINATION W/OUT ABNORMAL FINDINGS: ICD-10-CM

## 2022-10-26 PROCEDURE — 99214 OFFICE O/P EST MOD 30 MIN: CPT

## 2022-10-26 PROCEDURE — 73110 X-RAY EXAM OF WRIST: CPT | Mod: 50

## 2022-10-26 NOTE — DISCUSSION/SUMMARY
[FreeTextEntry1] : 1.  Sara has elected to proceed with a left wrist proximal row carpectomy with Howie flex resurfacing of the radius and left endoscopic carpal tunnel release\par \par #2 I would like to see her back 2 weeks after surgery for dedicated x-rays and a clinical examination

## 2022-10-26 NOTE — CONSULT LETTER
[Dear  ___] : Dear  [unfilled], [Consult Letter:] : I had the pleasure of evaluating your patient, [unfilled]. [Please see my note below.] : Please see my note below. [Consult Closing:] : Thank you very much for allowing me to participate in the care of this patient.  If you have any questions, please do not hesitate to contact me. [Sincerely,] : Sincerely, [FreeTextEntry2] : Mignon Mcdowell, DO\par [address]\[place] [address]\par \par  [FreeTextEntry3] : Donaldo daugherty MD

## 2022-10-26 NOTE — HISTORY OF PRESENT ILLNESS
[FreeTextEntry1] : Sara is a pleasant 62-year-old female who comes to see me with a multiple year history of left wrist pain.  She has frequent episodes of swelling and stiffness especially when performing activities that require wrist motion and  strength.  She has tried therapy which actually worsens her condition.  She has tried bracing which does not help her at all she states.

## 2022-10-26 NOTE — ASSESSMENT
[FreeTextEntry1] : ASSESSMENT:\par \par The patient comes in today with chronic left wrist pain complaints exacerbated by worsening with pretty much all activities of daily living.  She is tired of living with her condition.  She has tried bracing as well as therapy which both do not help and or worsen her condition.  This is significantly aggravating her\par [This is likely to diminish bodily function for the extremity.] \par \par She was adequately and thoroughly informed of my assessment of their current condition(s). 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  We talked about nonoperative modalities including splinting and injections as well as oral medications.  We also talked about operative modalities including wrist arthroscopy and more definitive procedures including a 4 bone fusion, PRC with radius resurfacing arthroflex, wrist arthroplasty with implants and total wrist fusion procedures. \par \par Surgical Consent Discussion:\par \par I explained the risks and benefits of surgical intervention to the patient. The risks include, but are not limited to: pain, infection, swelling, stiffness, injury to underlying neurovascular structures, scar sensitivity, incomplete resolution of symptoms, the possibility of the need for future surgery and finally the need to comply with a post-operative occupational therapy protocol. The patient understands, agrees and informed consent was obtained. The patient’s surgery will be scheduled in the near future.\par \par \par \par \par

## 2022-10-26 NOTE — PHYSICAL EXAM
[de-identified] : General Exam:\par \par [The patient is alert and oriented, is well appearing, and in no apparent distress]\par [Cervical spine mobility is full in all directions]\par [There are no apparent skin lesions, ulcers, or masses]\par [Inspection of the extremities shows no signs of skin changes or muscle asymmetry]\par \par Examination of her left wrist reveals significant dorsal radial swelling.  She has tenderness throughout the radial scaphoid articulation.\par She has minimal discomfort with DRUJ compression and range of motion.  Her pronation and supination is symmetric bilaterally\par Her flexion and extension on the left side are 45 and 30\par Her flexion and extension on the right side are 80 and 70 [de-identified] : [4] views of bilateral hands and wrists were obtained today in my office and were seen by me and discussed with the patient. \par These show findings consistent with left-sided radiocarpal joint arthritis with widening of the scapholunate interval\par

## 2022-12-01 ENCOUNTER — APPOINTMENT (OUTPATIENT)
Dept: ANTEPARTUM | Facility: CLINIC | Age: 62
End: 2022-12-01

## 2022-12-01 ENCOUNTER — APPOINTMENT (OUTPATIENT)
Dept: OBGYN | Facility: CLINIC | Age: 62
End: 2022-12-01

## 2022-12-01 ENCOUNTER — ASOB RESULT (OUTPATIENT)
Age: 62
End: 2022-12-01

## 2022-12-01 VITALS
BODY MASS INDEX: 29.27 KG/M2 | HEIGHT: 61 IN | DIASTOLIC BLOOD PRESSURE: 70 MMHG | WEIGHT: 155 LBS | SYSTOLIC BLOOD PRESSURE: 120 MMHG

## 2022-12-01 DIAGNOSIS — B37.2 CANDIDIASIS OF SKIN AND NAIL: ICD-10-CM

## 2022-12-01 DIAGNOSIS — R92.8 OTHER ABNORMAL AND INCONCLUSIVE FINDINGS ON DIAGNOSTIC IMAGING OF BREAST: ICD-10-CM

## 2022-12-01 DIAGNOSIS — Z01.419 ENCOUNTER FOR GYNECOLOGICAL EXAMINATION (GENERAL) (ROUTINE) W/OUT ABNORMAL FINDINGS: ICD-10-CM

## 2022-12-01 PROCEDURE — 99213 OFFICE O/P EST LOW 20 MIN: CPT

## 2022-12-01 PROCEDURE — 76830 TRANSVAGINAL US NON-OB: CPT

## 2022-12-05 PROBLEM — Z01.419 ENCOUNTER FOR WELL WOMAN EXAM WITH ROUTINE GYNECOLOGICAL EXAM: Status: RESOLVED | Noted: 2022-04-05 | Resolved: 2022-12-05

## 2022-12-05 PROBLEM — B37.2: Status: RESOLVED | Noted: 2021-01-27 | Resolved: 2022-12-05

## 2022-12-05 PROBLEM — R92.8 ABNORMAL MAMMOGRAM OF LEFT BREAST: Status: RESOLVED | Noted: 2021-05-10 | Resolved: 2022-12-05

## 2022-12-05 NOTE — DISCUSSION/SUMMARY
[FreeTextEntry1] : Sono results reviewed and echogenic foci noted is currently stable. Recommended repeat imaging yearly with her annual exam. Prescription for a transvaginal sonogram given. \par \par F/u for pelvic sono with annual exam or as needed.

## 2022-12-05 NOTE — END OF VISIT
[FreeTextEntry3] : I, Wm Johnson solely acted as scribe for Dr. Harmony Lai on 12/01/2022 \par All medical entries made by the Scribe were at my, Dr. Lai’s, direction and personally\par dictated by me on 12/01/2022 . I have reviewed the chart and agree that the record\par accurately reflects my personal performance of the history, physical exam, assessment and plan. I\par have also personally directed, reviewed, and agreed with the chart.

## 2022-12-05 NOTE — HISTORY OF PRESENT ILLNESS
[N] : Patient does not use contraception [Y] : Positive pregnancy history [Menarche Age: ____] : age at menarche was [unfilled] [Currently Active] : currently active [Mammogramdate] : 05/07/21 [TextBox_19] : BR4 L BREAST [BreastSonogramDate] : 05/03/21 [TextBox_25] : BR0 [PapSmeardate] : 04/05/22 [TextBox_31] : NEG [HPVDate] : 04/05/22 [TextBox_78] : NEG [LMPDate] : 2007 [PGHxTotal] : 2 [[facility]xFullTerm] : 2 [[facility] CenterxLiving] : 2 [FreeTextEntry1] : 2007

## 2022-12-15 ENCOUNTER — APPOINTMENT (OUTPATIENT)
Dept: ORTHOPEDIC SURGERY | Facility: CLINIC | Age: 62
End: 2022-12-15

## 2023-01-09 ENCOUNTER — OUTPATIENT (OUTPATIENT)
Dept: OUTPATIENT SERVICES | Facility: HOSPITAL | Age: 63
LOS: 1 days | End: 2023-01-09
Payer: MEDICARE

## 2023-01-09 DIAGNOSIS — Z96.651 PRESENCE OF RIGHT ARTIFICIAL KNEE JOINT: Chronic | ICD-10-CM

## 2023-01-09 DIAGNOSIS — Z98.89 OTHER SPECIFIED POSTPROCEDURAL STATES: Chronic | ICD-10-CM

## 2023-01-09 DIAGNOSIS — Z90.49 ACQUIRED ABSENCE OF OTHER SPECIFIED PARTS OF DIGESTIVE TRACT: Chronic | ICD-10-CM

## 2023-01-09 DIAGNOSIS — S82.209P UNSPECIFIED FRACTURE OF SHAFT OF UNSPECIFIED TIBIA, SUBSEQUENT ENCOUNTER FOR CLOSED FRACTURE WITH MALUNION: Chronic | ICD-10-CM

## 2023-01-09 DIAGNOSIS — Z01.818 ENCOUNTER FOR OTHER PREPROCEDURAL EXAMINATION: ICD-10-CM

## 2023-01-09 DIAGNOSIS — K46.9 UNSPECIFIED ABDOMINAL HERNIA WITHOUT OBSTRUCTION OR GANGRENE: Chronic | ICD-10-CM

## 2023-01-09 LAB
ALBUMIN SERPL ELPH-MCNC: 3.7 G/DL — SIGNIFICANT CHANGE UP (ref 3.3–5.2)
ALP SERPL-CCNC: 115 U/L — SIGNIFICANT CHANGE UP (ref 40–120)
ALT FLD-CCNC: 19 U/L — SIGNIFICANT CHANGE UP
ANION GAP SERPL CALC-SCNC: 8 MMOL/L — SIGNIFICANT CHANGE UP (ref 5–17)
APTT BLD: 27 SEC — LOW (ref 27.5–35.5)
AST SERPL-CCNC: 23 U/L — SIGNIFICANT CHANGE UP
BASOPHILS # BLD AUTO: 0.05 K/UL — SIGNIFICANT CHANGE UP (ref 0–0.2)
BASOPHILS NFR BLD AUTO: 1 % — SIGNIFICANT CHANGE UP (ref 0–2)
BILIRUB SERPL-MCNC: 0.3 MG/DL — LOW (ref 0.4–2)
BUN SERPL-MCNC: 14.5 MG/DL — SIGNIFICANT CHANGE UP (ref 8–20)
CALCIUM SERPL-MCNC: 8.7 MG/DL — SIGNIFICANT CHANGE UP (ref 8.4–10.5)
CHLORIDE SERPL-SCNC: 104 MMOL/L — SIGNIFICANT CHANGE UP (ref 96–108)
CO2 SERPL-SCNC: 27 MMOL/L — SIGNIFICANT CHANGE UP (ref 22–29)
CREAT SERPL-MCNC: 0.47 MG/DL — LOW (ref 0.5–1.3)
EGFR: 109 ML/MIN/1.73M2 — SIGNIFICANT CHANGE UP
EOSINOPHIL # BLD AUTO: 0.07 K/UL — SIGNIFICANT CHANGE UP (ref 0–0.5)
EOSINOPHIL NFR BLD AUTO: 1.4 % — SIGNIFICANT CHANGE UP (ref 0–6)
GLUCOSE SERPL-MCNC: 93 MG/DL — SIGNIFICANT CHANGE UP (ref 70–99)
HCT VFR BLD CALC: 43.5 % — SIGNIFICANT CHANGE UP (ref 34.5–45)
HGB BLD-MCNC: 11.7 G/DL — SIGNIFICANT CHANGE UP (ref 11.5–15.5)
IMM GRANULOCYTES NFR BLD AUTO: 0.4 % — SIGNIFICANT CHANGE UP (ref 0–0.9)
INR BLD: 0.95 RATIO — SIGNIFICANT CHANGE UP (ref 0.88–1.16)
LYMPHOCYTES # BLD AUTO: 1.62 K/UL — SIGNIFICANT CHANGE UP (ref 1–3.3)
LYMPHOCYTES # BLD AUTO: 32.9 % — SIGNIFICANT CHANGE UP (ref 13–44)
MCHC RBC-ENTMCNC: 25.8 PG — LOW (ref 27–34)
MCHC RBC-ENTMCNC: 26.9 GM/DL — LOW (ref 32–36)
MCV RBC AUTO: 95.8 FL — SIGNIFICANT CHANGE UP (ref 80–100)
MONOCYTES # BLD AUTO: 0.38 K/UL — SIGNIFICANT CHANGE UP (ref 0–0.9)
MONOCYTES NFR BLD AUTO: 7.7 % — SIGNIFICANT CHANGE UP (ref 2–14)
NEUTROPHILS # BLD AUTO: 2.78 K/UL — SIGNIFICANT CHANGE UP (ref 1.8–7.4)
NEUTROPHILS NFR BLD AUTO: 56.6 % — SIGNIFICANT CHANGE UP (ref 43–77)
PLATELET # BLD AUTO: 304 K/UL — SIGNIFICANT CHANGE UP (ref 150–400)
POTASSIUM SERPL-MCNC: 4.2 MMOL/L — SIGNIFICANT CHANGE UP (ref 3.5–5.3)
POTASSIUM SERPL-SCNC: 4.2 MMOL/L — SIGNIFICANT CHANGE UP (ref 3.5–5.3)
PROT SERPL-MCNC: 6.1 G/DL — LOW (ref 6.6–8.7)
PROTHROM AB SERPL-ACNC: 11 SEC — SIGNIFICANT CHANGE UP (ref 10.5–13.4)
RBC # BLD: 4.54 M/UL — SIGNIFICANT CHANGE UP (ref 3.8–5.2)
RBC # FLD: 17 % — HIGH (ref 10.3–14.5)
SODIUM SERPL-SCNC: 139 MMOL/L — SIGNIFICANT CHANGE UP (ref 135–145)
WBC # BLD: 4.92 K/UL — SIGNIFICANT CHANGE UP (ref 3.8–10.5)
WBC # FLD AUTO: 4.92 K/UL — SIGNIFICANT CHANGE UP (ref 3.8–10.5)

## 2023-01-09 PROCEDURE — G0463: CPT

## 2023-01-09 PROCEDURE — 93010 ELECTROCARDIOGRAM REPORT: CPT

## 2023-01-09 PROCEDURE — 93005 ELECTROCARDIOGRAM TRACING: CPT

## 2023-01-09 PROCEDURE — 80053 COMPREHEN METABOLIC PANEL: CPT

## 2023-01-09 PROCEDURE — 85610 PROTHROMBIN TIME: CPT

## 2023-01-09 PROCEDURE — 85730 THROMBOPLASTIN TIME PARTIAL: CPT

## 2023-01-09 PROCEDURE — 36415 COLL VENOUS BLD VENIPUNCTURE: CPT

## 2023-01-09 PROCEDURE — 85025 COMPLETE CBC W/AUTO DIFF WBC: CPT

## 2023-01-12 ENCOUNTER — NON-APPOINTMENT (OUTPATIENT)
Age: 63
End: 2023-01-12

## 2023-01-23 ENCOUNTER — APPOINTMENT (OUTPATIENT)
Dept: ORTHOPEDIC SURGERY | Facility: AMBULATORY SURGERY CENTER | Age: 63
End: 2023-01-23
Payer: MEDICARE

## 2023-01-23 PROCEDURE — 25446 ARTHRP W/PROSTC DST RDS&CRPS: CPT | Mod: RT

## 2023-01-23 PROCEDURE — 64772 INCISION OF SPINAL NERVE: CPT | Mod: RT

## 2023-01-24 LAB — SARS-COV-2 N GENE NPH QL NAA+PROBE: NOT DETECTED

## 2023-02-07 ENCOUNTER — APPOINTMENT (OUTPATIENT)
Dept: ORTHOPEDIC SURGERY | Facility: CLINIC | Age: 63
End: 2023-02-07
Payer: MEDICARE

## 2023-02-07 PROCEDURE — 99024 POSTOP FOLLOW-UP VISIT: CPT

## 2023-02-07 NOTE — HISTORY OF PRESENT ILLNESS
[de-identified] : s/p left wrist total arthroplasty 1/23/23 [de-identified] : Sara returns for follow-up. [de-identified] : She is well-appearing on examination.  Examination of the left wrist reveals an incision that is healing excellently without any signs of infection.  There is the expected amount of swelling at the surgical site.  There is mild stiffness of the digits. [de-identified] : I am delighted to see that Sara is doing so well.  At this point in time it is prudent to commence occupational therapy to move the digits.  She will also protect the wrist with her left wrist brace to allow for more healing. [de-identified] : 1.  I would like to see her back in 4 weeks time and taper off the wrist brace as well as continue with occupational therapy

## 2023-02-25 NOTE — ED ADULT NURSE NOTE - NSIMPLEMENTINTERV_GEN_ALL_ED
show Implemented All Universal Safety Interventions:  [place] to call system. Call bell, personal items and telephone within reach. Instruct patient to call for assistance. Room bathroom lighting operational. Non-slip footwear when patient is off stretcher. Physically safe environment: no spills, clutter or unnecessary equipment. Stretcher in lowest position, wheels locked, appropriate side rails in place.

## 2023-03-10 ENCOUNTER — APPOINTMENT (OUTPATIENT)
Dept: ORTHOPEDIC SURGERY | Facility: CLINIC | Age: 63
End: 2023-03-10
Payer: MEDICARE

## 2023-03-10 VITALS
BODY MASS INDEX: 29.27 KG/M2 | WEIGHT: 155 LBS | HEART RATE: 67 BPM | HEIGHT: 61 IN | SYSTOLIC BLOOD PRESSURE: 135 MMHG | DIASTOLIC BLOOD PRESSURE: 80 MMHG

## 2023-03-10 PROCEDURE — 99024 POSTOP FOLLOW-UP VISIT: CPT

## 2023-03-10 NOTE — HISTORY OF PRESENT ILLNESS
[de-identified] : s/p left wrist total arthroplasty 1/23/23 [de-identified] : Sara returns for follow-up.  She has been progressing tremendously with OT [de-identified] : She is well-appearing on examination.  Examination of the left wrist reveals an incision that is healing excellently without any signs of infection.  Swelling has improved dramatically at the surgical site.  She has excellent digital motion without any stiffness.  Her wrist extension is 50 degrees and flexion 20 degrees.  This is grossly painless [de-identified] : I am delighted to see that she is doing so well and so is she.  At this point in time she will continue with occupational therapy therapy and will progress to not use the wrist brace in the next week or 2. [de-identified] : 1.  We will see each other again in 6 weeks time for a clinical assessment

## 2023-04-07 ENCOUNTER — APPOINTMENT (OUTPATIENT)
Dept: ORTHOPEDIC SURGERY | Facility: CLINIC | Age: 63
End: 2023-04-07
Payer: MEDICARE

## 2023-04-07 VITALS
HEART RATE: 68 BPM | BODY MASS INDEX: 29.27 KG/M2 | SYSTOLIC BLOOD PRESSURE: 148 MMHG | WEIGHT: 155 LBS | DIASTOLIC BLOOD PRESSURE: 96 MMHG | HEIGHT: 61 IN

## 2023-04-07 PROCEDURE — 20550 NJX 1 TENDON SHEATH/LIGAMENT: CPT | Mod: 79,F2

## 2023-04-07 PROCEDURE — 99214 OFFICE O/P EST MOD 30 MIN: CPT | Mod: 25

## 2023-04-07 NOTE — HISTORY OF PRESENT ILLNESS
[de-identified] : s/p left wrist total arthroplasty 1/23/23.  [de-identified] : Sara returns for follow-up.  She has been working diligently with occupational therapy to restore motion and strength.  At this point in time she complains of worsening pain at the base of the left index, middle, ring fingers albeit she does describe significant improvement in motion in general at the fingers. [de-identified] : She is well-appearing on examination.  Examination of the left wrist reveals an incision that has healed well.  There is mild to moderate swelling at the level of the wrist which is expected for this type of surgery.  She has 45 extension and 45 flexion.  There is no crepitus with motion.  She has full digital motion\par Examination of the left hand particularly at the A1 of the index middle and ring reveals tenderness with a palpable click. \par [There is associated PIP joint stiffness as well]\par  [de-identified] : Imaging of the left wrist reveals status post proximal row carpectomy with preserved joint space at the new radiocarpal joint [de-identified] : Sara is doing well from her left wrist surgery however she has developed somewhat severe tendinopathy of the left index, middle and ring fingers.  At this point in time I do recommend injection therapy and she has elected to proceed.\par Injection:\par \par The risks and benefits of a steroid injection were discussed in detail. The risks include but are not limited to: pain, infection, swelling, flare response, bleeding, subcutaneous fat atrophy, skin depigmentation and/or elevation of blood sugar. The risk of incomplete resolution of symptoms, recurrence and additional intervention was reviewed and considered by the patient. \par The patient agreed to proceed and under a sterile prep, I injected 1 unit into 2 cc of a combination of Celestone and Lidocaine into the left trigger A1 of the index middle and ring fingers. The patient tolerated the injection well.\par  [de-identified] : 1.  I am hopeful that the left-sided injection for the index middle and ring will help with her tendinopathy\par #2 she will continue with OT and we will see each other again in 3 months time

## 2023-04-18 NOTE — DISCHARGE NOTE ADULT - CARE PROVIDERS DIRECT ADDRESSES
Implemented All Universal Safety Interventions:  [place] to call system. Call bell, personal items and telephone within reach. Instruct patient to call for assistance. Room bathroom lighting operational. Non-slip footwear when patient is off stretcher. Physically safe environment: no spills, clutter or unnecessary equipment. Stretcher in lowest position, wheels locked, appropriate side rails in place. ,[email or web address]

## 2023-05-25 ENCOUNTER — APPOINTMENT (OUTPATIENT)
Dept: OBGYN | Facility: CLINIC | Age: 63
End: 2023-05-25
Payer: MEDICARE

## 2023-05-25 ENCOUNTER — ASOB RESULT (OUTPATIENT)
Age: 63
End: 2023-05-25

## 2023-05-25 ENCOUNTER — APPOINTMENT (OUTPATIENT)
Dept: ANTEPARTUM | Facility: CLINIC | Age: 63
End: 2023-05-25
Payer: MEDICARE

## 2023-05-25 VITALS
BODY MASS INDEX: 28.96 KG/M2 | SYSTOLIC BLOOD PRESSURE: 114 MMHG | DIASTOLIC BLOOD PRESSURE: 66 MMHG | WEIGHT: 153.4 LBS | HEIGHT: 61 IN

## 2023-05-25 DIAGNOSIS — N64.9 DISORDER OF BREAST, UNSPECIFIED: ICD-10-CM

## 2023-05-25 DIAGNOSIS — Z12.11 ENCOUNTER FOR SCREENING FOR MALIGNANT NEOPLASM OF COLON: ICD-10-CM

## 2023-05-25 DIAGNOSIS — Z79.811 LONG TERM (CURRENT) USE OF AROMATASE INHIBITORS: ICD-10-CM

## 2023-05-25 DIAGNOSIS — M16.11 UNILATERAL PRIMARY OSTEOARTHRITIS, RIGHT HIP: ICD-10-CM

## 2023-05-25 DIAGNOSIS — M65.30 TRIGGER FINGER, UNSPECIFIED FINGER: ICD-10-CM

## 2023-05-25 DIAGNOSIS — Z91.89 OTHER SPECIFIED PERSONAL RISK FACTORS, NOT ELSEWHERE CLASSIFIED: ICD-10-CM

## 2023-05-25 DIAGNOSIS — N83.209 UNSPECIFIED OVARIAN CYST, UNSPECIFIED SIDE: ICD-10-CM

## 2023-05-25 DIAGNOSIS — Z87.898 PERSONAL HISTORY OF OTHER SPECIFIED CONDITIONS: ICD-10-CM

## 2023-05-25 DIAGNOSIS — M25.532 PAIN IN LEFT WRIST: ICD-10-CM

## 2023-05-25 DIAGNOSIS — M85.80 OTHER SPECIFIED DISORDERS OF BONE DENSITY AND STRUCTURE, UNSPECIFIED SITE: ICD-10-CM

## 2023-05-25 DIAGNOSIS — Z01.411 ENCOUNTER FOR GYNECOLOGICAL EXAMINATION (GENERAL) (ROUTINE) WITH ABNORMAL FINDINGS: ICD-10-CM

## 2023-05-25 DIAGNOSIS — M17.12 UNILATERAL PRIMARY OSTEOARTHRITIS, LEFT KNEE: ICD-10-CM

## 2023-05-25 DIAGNOSIS — N60.99 UNSPECIFIED BENIGN MAMMARY DYSPLASIA OF UNSPECIFIED BREAST: ICD-10-CM

## 2023-05-25 DIAGNOSIS — M25.512 PAIN IN LEFT SHOULDER: ICD-10-CM

## 2023-05-25 PROCEDURE — 99212 OFFICE O/P EST SF 10 MIN: CPT | Mod: 25

## 2023-05-25 PROCEDURE — G0101: CPT

## 2023-05-25 PROCEDURE — 99396 PREV VISIT EST AGE 40-64: CPT | Mod: GY

## 2023-05-25 PROCEDURE — 76830 TRANSVAGINAL US NON-OB: CPT

## 2023-05-25 RX ORDER — OXYCODONE 10 MG/1
10 TABLET ORAL EVERY 6 HOURS
Qty: 28 | Refills: 0 | Status: DISCONTINUED | COMMUNITY
Start: 2023-01-27 | End: 2023-05-25

## 2023-05-25 RX ORDER — NYSTATIN 100000 [USP'U]/G
100000 CREAM TOPICAL TWICE DAILY
Qty: 2 | Refills: 1 | Status: DISCONTINUED | COMMUNITY
Start: 2021-01-27 | End: 2023-05-25

## 2023-05-25 RX ORDER — VITAMIN E ACID SUCCINATE 268 MG
TABLET ORAL
Refills: 0 | Status: DISCONTINUED | COMMUNITY
End: 2023-05-25

## 2023-05-25 RX ORDER — OXYCODONE 5 MG/1
5 TABLET ORAL EVERY 6 HOURS
Qty: 24 | Refills: 0 | Status: DISCONTINUED | COMMUNITY
Start: 2023-01-22 | End: 2023-05-25

## 2023-05-25 RX ORDER — IBUPROFEN 800 MG/1
800 TABLET, FILM COATED ORAL 3 TIMES DAILY
Qty: 21 | Refills: 0 | Status: DISCONTINUED | COMMUNITY
Start: 2023-03-29 | End: 2023-05-25

## 2023-05-25 RX ORDER — MELOXICAM 7.5 MG/1
7.5 TABLET ORAL
Qty: 60 | Refills: 0 | Status: DISCONTINUED | COMMUNITY
Start: 2023-01-27 | End: 2023-05-25

## 2023-05-25 RX ORDER — METHYLPREDNISOLONE 4 MG/1
4 TABLET ORAL
Qty: 1 | Refills: 0 | Status: DISCONTINUED | COMMUNITY
Start: 2022-05-24 | End: 2023-05-25

## 2023-05-25 RX ORDER — NAPROXEN 500 MG/1
500 TABLET ORAL
Qty: 60 | Refills: 1 | Status: DISCONTINUED | COMMUNITY
Start: 2022-05-24 | End: 2023-05-25

## 2023-05-25 RX ORDER — ATORVASTATIN CALCIUM 80 MG/1
TABLET, FILM COATED ORAL
Refills: 0 | Status: ACTIVE | COMMUNITY

## 2023-05-25 RX ORDER — PREGABALIN 300 MG/1
CAPSULE ORAL
Refills: 0 | Status: DISCONTINUED | COMMUNITY
End: 2023-05-25

## 2023-05-29 PROBLEM — Z87.898 HISTORY OF LUMP OF RIGHT BREAST: Status: RESOLVED | Noted: 2020-05-19 | Resolved: 2023-05-29

## 2023-05-29 LAB — HPV HIGH+LOW RISK DNA PNL CVX: NOT DETECTED

## 2023-05-29 NOTE — DISCUSSION/SUMMARY
[FreeTextEntry1] : Previous sono results reviewed in conjunction with today's sono findings. The left ovary presents with an echogenic foci measuring 7 mm (unchanged from previous). Repeat imaging recommended yearly with her annual exam. Prescription for a transvaginal sonogram given.\par \par We reviewed her most recent DEXA scan results which showed osteopenia. She is aware to continue taking vitamin D and calcium and to complete daily weight-bearing exercises. \par \par Benign breast and pelvic exam. Pap done today.\par \par Self-breast exam reviewed.\par \par She will follow up annually and as needed.

## 2023-05-29 NOTE — HISTORY OF PRESENT ILLNESS
[HPV test offered] : HPV test offered [Menarche Age: ____] : age at menarche was [unfilled] [No] : Patient does not have concerns regarding sex [Y] : Patient is sexually active [Currently Active] : currently active [Mammogramdate] : 5/3/21 [TextBox_19] : BR0 [BreastSonogramDate] : 5/3/21 [TextBox_25] : BR0 [PapSmeardate] : 4/5/22 [TextBox_31] : NEG [ColonoscopyDate] : 2022 [TextBox_43] : per patient [HPVDate] : 4/5/22 [TextBox_78] : NEG [LMPDate] : 2007 [PGHxTotal] : 2 [[facility] HospitalxFullTerm] : 2 [[facility]] [facility] 2 [FreeTextEntry1] : 2007

## 2023-05-29 NOTE — END OF VISIT
[FreeTextEntry3] : I, Wm Johnson solely acted as scribe for Dr. Harmony Lai on 05/25/2023 \par All medical entries made by the Scribe were at my, Dr. Lai’s, direction and personally\par dictated by me on 05/25/2023 . I have reviewed the chart and agree that the record\par accurately reflects my personal performance of the history, physical exam, assessment and plan. I\par have also personally directed, reviewed, and agreed with the chart.

## 2023-05-31 NOTE — CONSULT NOTE ADULT - SUBJECTIVE AND OBJECTIVE BOX
Please tell mom that the urinalysis is normal. At the time we collected it ,the urine was slightly concentrated but normal. She needs to encourage her to drink water especially now in the summer. CC: Left knee pain  HPI: 57 year old female with left knee pain for the last few years, she had right knee replacement last year, now S/P right knee arthroplasty for OA, POD#0, patient seen and examined      PAST MEDICAL & SURGICAL HISTORY:  Neuropathy  Dyslipidemia  Rheumatoid arthritis  Anxiety  Colon cancer  H/O total knee replacement, right: 2016  Status post left breast lumpectomy  History of cholecystectomy  Closed fracture of tibia with malunion: s/p surgical repair; hardware removed 11/2016  H/O excision of mass: cysts right hand, R shoulder, L leg, R leg  S/P carpal tunnel release: bilat  S/P arthroscopic knee surgery: bilat  Abdominal hernia  History of colon surgery      Home Medications:   · 	simvastatin 40 mg oral tablet , 1 tab(s) orally once a day (at bedtime)  · 	Lyrica 75 mg oral capsule, 1 cap(s) orally 2 times a day      MEDICATIONS  (STANDING):  ceFAZolin   IVPB 2000 milliGRAM(s) IV Intermittent <User Schedule>  lactated ringers. 1000 milliLiter(s) (100 mL/Hr) IV Continuous <Continuous>    MEDICATIONS  (PRN):  fentaNYL    Injectable 25 MICROGram(s) IV Push every 5 minutes PRN Moderate Pain  ondansetron Injectable 4 milliGRAM(s) IV Push once PRN Nausea and/or Vomiting      Allergies    No Known Allergies    Intolerances        SOCIAL HISTORY:  Disabled  Former smoker  Denies ETOH/IVDA    FAMILY HISTORY:  Family history of Hodgkin's lymphoma (Child)  Family history of prostate cancer  Family history of peritoneal cancer      Vital Signs Last 24 Hrs  T(C): 36.6 (27 Oct 2017 12:00), Max: 36.6 (27 Oct 2017 12:00)  T(F): 97.9 (27 Oct 2017 12:00), Max: 97.9 (27 Oct 2017 12:00)  HR: 71 (27 Oct 2017 12:55) (71 - 81)  BP: 108/58 (27 Oct 2017 12:55) (108/58 - 132/57)  BP(mean): --  RR: 12 (27 Oct 2017 12:55) (12 - 18)  SpO2: 95% (27 Oct 2017 12:55) (94% - 99%)    LABS:                  RADIOLOGY & ADDITIONAL STUDIES: PMD : Brionna       CC: Left knee pain  HPI: 57 year old female with left knee pain for the last few years, she had right knee replacement last year, pain was getting worst ,  now S/P right knee arthroplasty for OA, POD#0, patient seen and examined on PACU .      PAST MEDICAL & SURGICAL HISTORY:  Neuropathy  Dyslipidemia  Rheumatoid arthritis  Anxiety  Colon cancer  H/O total knee replacement, right: 2016  Status post left breast lumpectomy  History of cholecystectomy  Closed fracture of tibia with malunion: s/p surgical repair; hardware removed 11/2016  H/O excision of mass: cysts right hand, R shoulder, L leg, R leg  S/P carpal tunnel release: bilat  S/P arthroscopic knee surgery: bilat  Abdominal hernia  History of colon surgery      Home Medications:   · 	simvastatin 40 mg oral tablet , 1 tab(s) orally once a day (at bedtime)  · 	Lyrica 75 mg oral capsule, 1 cap(s) orally 2 times a day      MEDICATIONS  (STANDING):  ceFAZolin   IVPB 2000 milliGRAM(s) IV Intermittent <User Schedule>  lactated ringers. 1000 milliLiter(s) (100 mL/Hr) IV Continuous <Continuous>    MEDICATIONS  (PRN):  fentaNYL    Injectable 25 MICROGram(s) IV Push every 5 minutes PRN Moderate Pain  ondansetron Injectable 4 milliGRAM(s) IV Push once PRN Nausea and/or Vomiting      Allergies    No Known Allergies    Intolerances        SOCIAL HISTORY:  Disabled  Former smoker  Denies ETOH/IVDA    FAMILY HISTORY:  Family history of Hodgkin's lymphoma (Child)  Family history of prostate cancer  Family history of peritoneal cancer      Vital Signs Last 24 Hrs  T(C): 36.6 (27 Oct 2017 12:00), Max: 36.6 (27 Oct 2017 12:00)  T(F): 97.9 (27 Oct 2017 12:00), Max: 97.9 (27 Oct 2017 12:00)  HR: 71 (27 Oct 2017 12:55) (71 - 81)  BP: 108/58 (27 Oct 2017 12:55) (108/58 - 132/57)  BP(mean): --  RR: 12 (27 Oct 2017 12:55) (12 - 18)  SpO2: 95% (27 Oct 2017 12:55) (94% - 99%)    LABS: Pending         RADIOLOGY & ADDITIONAL STUDIES: PMD : Brionna       CC: Left knee pain  HPI: 57 year old female with left knee pain for the last few years, she had right knee replacement last year, pain was getting worst ,  now S/P right knee arthroplasty for OA, POD#0, patient seen and examined on PACU .      PAST MEDICAL & SURGICAL HISTORY:  Fibromyalgia   Dyslipidemia  Anxiety  Colon cancer  H/O total knee replacement, right: 2016  Status post left breast lumpectomy  History of cholecystectomy  Closed fracture of tibia with malunion: s/p surgical repair; hardware removed 11/2016  H/O excision of mass: cysts right hand, R shoulder, L leg, R leg  S/P carpal tunnel release: bilat  S/P arthroscopic knee surgery: bilat  Abdominal hernia  History of colon surgery      Home Medications:   · 	simvastatin 40 mg oral tablet , 1 tab(s) orally once a day (at bedtime)  · 	Lyrica 75 mg oral capsule, 1 cap(s) orally 2 times a day      MEDICATIONS  (STANDING):  ceFAZolin   IVPB 2000 milliGRAM(s) IV Intermittent <User Schedule>  lactated ringers. 1000 milliLiter(s) (100 mL/Hr) IV Continuous <Continuous>    MEDICATIONS  (PRN):  fentaNYL    Injectable 25 MICROGram(s) IV Push every 5 minutes PRN Moderate Pain  ondansetron Injectable 4 milliGRAM(s) IV Push once PRN Nausea and/or Vomiting      Allergies    No Known Allergies    Intolerances        SOCIAL HISTORY:  Disabled  Former smoker  Denies ETOH/IVDA    FAMILY HISTORY:  Family history of Hodgkin's lymphoma (Child)  Family history of prostate cancer  Family history of peritoneal cancer      Vital Signs Last 24 Hrs  T(C): 36.6 (27 Oct 2017 12:00), Max: 36.6 (27 Oct 2017 12:00)  T(F): 97.9 (27 Oct 2017 12:00), Max: 97.9 (27 Oct 2017 12:00)  HR: 71 (27 Oct 2017 12:55) (71 - 81)  BP: 108/58 (27 Oct 2017 12:55) (108/58 - 132/57)  BP(mean): --  RR: 12 (27 Oct 2017 12:55) (12 - 18)  SpO2: 95% (27 Oct 2017 12:55) (94% - 99%)    LABS: Pending         RADIOLOGY & ADDITIONAL STUDIES:

## 2023-06-02 LAB — CYTOLOGY CVX/VAG DOC THIN PREP: ABNORMAL

## 2023-06-14 ENCOUNTER — APPOINTMENT (OUTPATIENT)
Dept: ORTHOPEDIC SURGERY | Facility: CLINIC | Age: 63
End: 2023-06-14
Payer: MEDICARE

## 2023-06-14 VITALS
HEART RATE: 61 BPM | BODY MASS INDEX: 29.27 KG/M2 | TEMPERATURE: 98.1 F | WEIGHT: 155 LBS | DIASTOLIC BLOOD PRESSURE: 91 MMHG | SYSTOLIC BLOOD PRESSURE: 126 MMHG | HEIGHT: 61 IN

## 2023-06-14 PROCEDURE — 99213 OFFICE O/P EST LOW 20 MIN: CPT

## 2023-06-14 NOTE — REASON FOR VISIT
[Initial Visit] : an initial visit for [Hand Pain] : hand pain [Wrist Pain] : wrist pain [FreeTextEntry2] : left hand/wrist pain.

## 2023-06-14 NOTE — ASSESSMENT
[FreeTextEntry1] : ASSESSMENT:\par The patient comes in today with status post left-sided proximal row carpectomy with allograft resurfacing.  We have discussed her current findings and at this point in time she demonstrates improvement however it may take up to 1 year for full recovery postoperatively.  She will continue with occupational therapy and strengthening\par \par The patient was adequately and thoroughly informed of my assessment of their current condition(s).  - This may diminish bodily function for the extremity.\par We discussed prognosis, treatment modalities including operative and nonoperative options for the above diagnostic assessment.\par [For this I was able to review other physician’s note(s) including reviewing other tests, imaging results as well as personally view these results for my own interpretation.] \par \par The patient was adequately and thoroughly informed of my assessment of their current condition(s). \par \par DISCUSSION:\par 1.  We will see each other again in 3 months time to reassess\par \par

## 2023-06-14 NOTE — HISTORY OF PRESENT ILLNESS
[FreeTextEntry1] : Sara returns for postoperative follow-up.  Overall she describes continued improvement.  She has been working diligently with occupational therapy to enhance range of motion and ADLs.\par

## 2023-06-14 NOTE — PHYSICAL EXAM
[de-identified] : She is well-appearing on examination. Examination of the left wrist reveals an incision that has healed well. There is mild swelling at the level of the wrist which is improved as compared to prior visit.. She has about 55 degrees of flexion extension. There is no crepitus with motion. She has full digital motion\par

## 2023-07-13 NOTE — H&P PST ADULT - NEUROLOGICAL
Wound Care: Petrolatum details… Alert & oriented; no sensory, motor or coordination deficits, normal reflexes

## 2023-08-22 ENCOUNTER — APPOINTMENT (OUTPATIENT)
Dept: ORTHOPEDIC SURGERY | Facility: CLINIC | Age: 63
End: 2023-08-22

## 2023-09-29 ENCOUNTER — APPOINTMENT (OUTPATIENT)
Dept: ORTHOPEDIC SURGERY | Facility: CLINIC | Age: 63
End: 2023-09-29
Payer: MEDICARE

## 2023-09-29 VITALS
WEIGHT: 155 LBS | DIASTOLIC BLOOD PRESSURE: 85 MMHG | HEIGHT: 61 IN | BODY MASS INDEX: 29.27 KG/M2 | SYSTOLIC BLOOD PRESSURE: 131 MMHG

## 2023-09-29 DIAGNOSIS — M25.551 PAIN IN RIGHT HIP: ICD-10-CM

## 2023-09-29 PROCEDURE — 73502 X-RAY EXAM HIP UNI 2-3 VIEWS: CPT | Mod: RT

## 2023-09-29 PROCEDURE — 99213 OFFICE O/P EST LOW 20 MIN: CPT

## 2023-09-29 PROCEDURE — 73562 X-RAY EXAM OF KNEE 3: CPT | Mod: RT

## 2023-10-06 ENCOUNTER — APPOINTMENT (OUTPATIENT)
Dept: ORTHOPEDIC SURGERY | Facility: CLINIC | Age: 63
End: 2023-10-06
Payer: MEDICARE

## 2023-10-06 VITALS
SYSTOLIC BLOOD PRESSURE: 113 MMHG | DIASTOLIC BLOOD PRESSURE: 77 MMHG | HEART RATE: 71 BPM | BODY MASS INDEX: 29.27 KG/M2 | WEIGHT: 155 LBS | HEIGHT: 61 IN

## 2023-10-06 DIAGNOSIS — M25.532 PAIN IN LEFT WRIST: ICD-10-CM

## 2023-10-06 PROCEDURE — 20605 DRAIN/INJ JOINT/BURSA W/O US: CPT | Mod: LT

## 2023-10-23 ENCOUNTER — OFFICE (OUTPATIENT)
Dept: URBAN - METROPOLITAN AREA CLINIC 12 | Facility: CLINIC | Age: 63
Setting detail: OPHTHALMOLOGY
End: 2023-10-23
Payer: MEDICARE

## 2023-10-23 VITALS — HEIGHT: 55 IN

## 2023-10-23 DIAGNOSIS — H25.13: ICD-10-CM

## 2023-10-23 DIAGNOSIS — H40.033: ICD-10-CM

## 2023-10-23 DIAGNOSIS — H52.7: ICD-10-CM

## 2023-10-23 PROCEDURE — 92250 FUNDUS PHOTOGRAPHY W/I&R: CPT | Performed by: OPTOMETRIST

## 2023-10-23 PROCEDURE — 92020 GONIOSCOPY: CPT | Performed by: OPTOMETRIST

## 2023-10-23 PROCEDURE — 92083 EXTENDED VISUAL FIELD XM: CPT | Performed by: OPTOMETRIST

## 2023-10-23 PROCEDURE — 99213 OFFICE O/P EST LOW 20 MIN: CPT | Performed by: OPTOMETRIST

## 2023-10-23 PROCEDURE — 92015 DETERMINE REFRACTIVE STATE: CPT | Performed by: OPTOMETRIST

## 2023-10-23 ASSESSMENT — REFRACTION_MANIFEST
OD_SPHERE: -2.50
OD_CYLINDER: -0.25
OS_SPHERE: -2.25
OD_CYLINDER: SPH
OS_SPHERE: -1.75
OD_SPHERE: -2.00
OS_CYLINDER: SPH
OS_CYLINDER: SPH
OS_VA1: 20/20
OD_VA1: 20/20
OD_VA1: 20/20
OS_ADD: +2.25
OD_AXIS: 090
OD_ADD: +2.25
OS_VA1: 20/20
OS_AXIS: 000

## 2023-10-23 ASSESSMENT — TONOMETRY
OS_IOP_MMHG: 16
OD_IOP_MMHG: 13

## 2023-10-23 ASSESSMENT — REFRACTION_AUTOREFRACTION
OS_SPHERE: -2.00
OD_AXIS: 113
OD_CYLINDER: -0.25
OS_AXIS: 175
OS_CYLINDER: -0.25
OD_SPHERE: -2.00

## 2023-10-23 ASSESSMENT — KERATOMETRY
OS_K2POWER_DIOPTERS: 44.25
OD_K2POWER_DIOPTERS: 43.50
OS_K1POWER_DIOPTERS: 43.50
OS_AXISANGLE_DEGREES: 075
OD_K1POWER_DIOPTERS: 43.25
OD_AXISANGLE_DEGREES: 058

## 2023-10-23 ASSESSMENT — CONFRONTATIONAL VISUAL FIELD TEST (CVF)
OD_FINDINGS: FULL
OS_FINDINGS: FULL

## 2023-10-23 ASSESSMENT — REFRACTION_CURRENTRX
OD_AXIS: 097
OS_SPHERE: -1.50
OS_CYLINDER: -0.25
OD_OVR_VA: 20/
OD_SPHERE: -2.00
OD_VPRISM_DIRECTION: SV
OS_VPRISM_DIRECTION: SV
OS_VPRISM_DIRECTION: SV
OS_OVR_VA: 20/
OD_SPHERE: -2.75
OD_VPRISM_DIRECTION: SV
OS_AXIS: 161
OS_AXIS: 109
OS_SPHERE: -2.75
OD_CYLINDER: -0.25
OD_AXIS: 139
OD_CYLINDER: -0.25
OS_OVR_VA: 20/
OS_CYLINDER: -0.25
OD_OVR_VA: 20/

## 2023-10-23 ASSESSMENT — VISUAL ACUITY
OS_BCVA: 20/20-1
OD_BCVA: 20/20-2

## 2023-10-23 ASSESSMENT — SPHEQUIV_DERIVED
OD_SPHEQUIV: -2.125
OD_SPHEQUIV: -2.125
OS_SPHEQUIV: -2.125

## 2023-10-23 ASSESSMENT — AXIALLENGTH_DERIVED
OD_AL: 24.4993
OS_AL: 24.3029
OD_AL: 24.4993

## 2023-11-01 ENCOUNTER — APPOINTMENT (OUTPATIENT)
Dept: PULMONOLOGY | Facility: CLINIC | Age: 63
End: 2023-11-01
Payer: MEDICARE

## 2023-11-01 VITALS
DIASTOLIC BLOOD PRESSURE: 64 MMHG | SYSTOLIC BLOOD PRESSURE: 118 MMHG | HEIGHT: 61 IN | WEIGHT: 145 LBS | OXYGEN SATURATION: 99 % | BODY MASS INDEX: 27.38 KG/M2 | RESPIRATION RATE: 16 BRPM | HEART RATE: 73 BPM

## 2023-11-01 DIAGNOSIS — Z87.891 PERSONAL HISTORY OF NICOTINE DEPENDENCE: ICD-10-CM

## 2023-11-01 PROCEDURE — 99203 OFFICE O/P NEW LOW 30 MIN: CPT

## 2023-12-05 ENCOUNTER — APPOINTMENT (OUTPATIENT)
Dept: ORTHOPEDIC SURGERY | Facility: CLINIC | Age: 63
End: 2023-12-05
Payer: MEDICARE

## 2023-12-05 VITALS
WEIGHT: 145 LBS | HEIGHT: 61 IN | HEART RATE: 67 BPM | DIASTOLIC BLOOD PRESSURE: 83 MMHG | SYSTOLIC BLOOD PRESSURE: 132 MMHG | BODY MASS INDEX: 27.38 KG/M2

## 2023-12-05 PROCEDURE — 73030 X-RAY EXAM OF SHOULDER: CPT | Mod: RT

## 2023-12-05 PROCEDURE — 99213 OFFICE O/P EST LOW 20 MIN: CPT

## 2023-12-05 RX ORDER — MELOXICAM 15 MG/1
15 TABLET ORAL
Qty: 21 | Refills: 0 | Status: ACTIVE | COMMUNITY
Start: 2023-12-05 | End: 1900-01-01

## 2023-12-27 ENCOUNTER — APPOINTMENT (OUTPATIENT)
Dept: PULMONOLOGY | Facility: CLINIC | Age: 63
End: 2023-12-27
Payer: MEDICARE

## 2023-12-27 VITALS
BODY MASS INDEX: 26.62 KG/M2 | RESPIRATION RATE: 16 BRPM | HEART RATE: 74 BPM | WEIGHT: 141 LBS | SYSTOLIC BLOOD PRESSURE: 118 MMHG | HEIGHT: 61 IN | OXYGEN SATURATION: 98 % | DIASTOLIC BLOOD PRESSURE: 82 MMHG

## 2023-12-27 PROCEDURE — 85018 HEMOGLOBIN: CPT | Mod: QW

## 2023-12-27 PROCEDURE — 94010 BREATHING CAPACITY TEST: CPT

## 2023-12-27 PROCEDURE — 99213 OFFICE O/P EST LOW 20 MIN: CPT | Mod: 25

## 2023-12-27 PROCEDURE — 94729 DIFFUSING CAPACITY: CPT

## 2023-12-27 PROCEDURE — 94727 GAS DIL/WSHOT DETER LNG VOL: CPT

## 2023-12-27 NOTE — HISTORY OF PRESENT ILLNESS
[Former] : former [TextBox_4] : 63F PMH former smoker, COVID (2022), colon cancer s/p resection (2014) who presents for f/u visit. Had PFT today showing FEV1/FVC 78.5%, FEV1 114.6%, .1%, MMEF 75/25 103.3%, TLC 90.8%, VCmax 116.5%, DLCOc 118.3%. She denies any pulmonary symptoms at this time, no fevers, no chills, no cough, no chest pain, no N/V/D.  [TextBox_11] : 0.75 [TextBox_13] : 30 [YearQuit] : 2014

## 2023-12-27 NOTE — ASSESSMENT
[FreeTextEntry1] : 63F PMH former smoker, COVID (2022), colon cancer s/p resection (2014) who presents for f/u visit.   - PFT today showing FEV1/FVC 78.5%, FEV1 114.6%, .1%, MMEF 75/25 103.3%, TLC 90.8%, VCmax 116.5%, DLCOc 118.3%. - This is a normal PFT - Repeat yearly screening CT 07/2024 - ordered - F/u with me in 08/2024 after CT scan performed  The patient expressed understanding and agreement with the plan as outlined above, and accepts that it is their responsibility to be compliant with any recommended testing, treatment, and follow-up visits. All relevant questions and concerns were addressed.  25 minutes of time were spent on the encounter. Medical records were reviewed, including but not limited to hospital records, outpatient records, laboratory data, and diagnostic imaging studies. Greater than 50% of the face-to-face encounter time was spent on counseling and/or coordination of care.  Francois De La Cruz M.D. Pulmonary & Critical Care Medicine [facility] at [place] [address] [place] [place] [address] T: [phone] F: [phone]

## 2024-01-01 NOTE — ED PROVIDER NOTE - PRINCIPAL DIAGNOSIS
[facility]  NICU Progress Note    Quang Moser Patient Status:  [place]    2022 MRN [id number]   Location [facility] NICU Attending Luis Treviño MD   Hosp Day # 30 days   GA at birth: Gestational Age: 34w0d   Corrected GA: 38w 2d           Interval History:  Infant remains comfortable in room air. No events in last 24 hours, and last non-feed event .    Tolerating advancing enteral feeds with improving PO volumes, took about 80%.    Objective:    Today's weight:    Wt Readings from Last 1 Encounters:   24 2320 g (<1 %, Z= -4.00)*     * Growth percentiles are based on WHO (Girls, 0-2 years) data.     Weight change since last weight:  Weight change: 10 g    Intake/Output:  Intake/Output          0700   0659  0700   0659    P.O. (mL/kg) 285 (122.84)     NG/GT (mL/kg) 75 (32.33)     Total Intake(mL/kg) 360 (155.17)     Net +360           Urine Occurrence 4 x     Stool Occurrence 4 x             Access/Lines: None    Respiratory Support:                                   Labs:    No results found for this or any previous visit (from the past 24 hour(s)).     Imaging:  None in last 24 hours.      Current medications:    Current Facility-Administered Medications   Medication Dose Route Frequency Provider Last Rate Last Admin    cholecalciferol (VITAMIN D) 10 mcg (400 units)/mL oral liquid 15 mcg  15 mcg Per NG Tube Daily Dipti Mcdonough MD   15 mcg at 24 0754    pediatric multivitamin with iron (POLY-VI-SOL WITH IRON) oral solution 0.5 mL  0.5 mL Per NG Tube Q24H Ankita Hernández MD   0.5 mL at 24    hepatitis B VACCINE (RECOMBIVAX-HB) 5 MCG/0.5ML vaccine 5 mcg  0.5 mL Intramuscular Once Luis Treviño MD           Physical Exam:  Vital Signs:  Visit Vitals  BP 67/45   Pulse 140   Temp 98.8 °F (37.1 °C) (Axillary)   Resp 43   Ht 17.32\" (44 cm)   Wt 2320 g   HC 30.7 cm (12.11\")   SpO2 99%   BMI 11.98 kg/m²         General:  Infant alert and appears comfortable  HEENT:  Anterior fontanelle soft and flat; eyes clear   Respiratory:  Normal respiratory rate, clear breath sounds bilaterally.  Cardiac: Normal rhythm, no murmur noted, pulses normal to palpation, capillary refill: brisk  Abdomen:  Soft, nondistended, non tender, active bowel sounds, no HSM   Neuro:  Awake and active; normal tone for gestation.  Ext:  Moves all extremities spontaneously.  Skin:  No rash or lesions noted; well perfused.    Assessment and Plan:  Patient Active Problem List   Diagnosis    Monochorionic diamniotic twin gestation     infant of 34 completed weeks of gestation      infant of 34 completed weeks of gestation    ACC (aplasia cutis congenita)   AGA 34 0/7 week female twin A  Mono-Di twins  Hypoglycemia - Resolved  Cutis aplasia - healing  Apnea of prematurity  Slow feedings in  infant  Jaundice due to prematurity - resolving    Quang Moser is an ex-Gestational Age: 34w0d infant born by , Low Transverse. Pregnancy complicated by FGR of both twins, elevated dopplers, and pre-eclampsia. Scheduled , cord around neck noted at delivery     RESP: resolved Difficult Transition with RDS and periodic breathing. Peak support 2L and to RA by . Caffeine finished at 36 weeks cGA on .    Plan: Monitor in room air. Follow for apnea of prematurity.      CV: No active issues.  Continue to monitor.     FEN/GI: poor PO feeder, resolved initial hypoglycemia.  Began small PO/NG feeds, advancing slowly as tolerated. 22 kcal as of . 24 kcal on . Electrolytes have been acceptable except mild hypernatremia, improved with advance in total fluids. Off IV fluids . Occasional small nb/nb emesis, abdominal exam reassuring. Transitioned from 24 kcal to 22 kcal  in anticipation of approaching discharge readiness.  On multivitamin with iron and vitamin D.     Plan: continue to advance feeds to  maintain total fluid goal, monitor tolerance. Po per cues. Vitamin D level 2/20 was low at 25.2, additional vitamin D added. Repeat in 2-3 weeks.     ID: sepsis ruled out on admission and again a screening blood culture was sent on 1/25 due to periodic breathing, no growth  Plan:  continue to monitor closely.    Heme: Screen CBC due to mono-di twins with adequate Hg on admission, monitor. Repeat CBC on 1/27 with Hct stable at 55%. Most recent H/H 16.3/47.5 on 2/12, with retic of 2.0%  Plan: Continue iron supplementation. Monitor H/H and retic next level 2/19     Bilirubin: Resolved hyperbili of prematurity.  Infant O+, milagro negative, infant O+, milagro negative, infant started on phototherapy although level was just below treatment threshold on 1/25 and continued till 1/27  F/up bili slow rising but remains below threshold. Serum bili 9.6/0.4 on 2/1, low on last check on 2/1/24.  Plan: Monitor clinically     Skin: Cutis aplasia noted in both infants after birth. Lesion consists of 4 hairless oval defects in semicircular placement, from right to left, measuring 0.75cm, 0.25cm, 0.2cm and 0.5cm respectively. The center of the lesions appear to be pale atrophic tissue, no open wound noted. Lesions are to the right of midline. Reviewed images with Dr. Damon, pediatric dermatology, who agreed lesions are classic for Cutis aplasia. Genetic testing not indicated as there have been reports in twins. Would not recommend imaging unless > 2-3 cm in size, midline, posterior to the vertex, or in association with nodule, hair collar, or surrounding vascular patch. Lesion on left just slightly near midline after slight molding had resolved, HUS done on 1/24 showed no communication, no IVH, no hydrocephalus, no bone involvement. Plan: Continue to clean with mild soap and water and apply vasoline until heals over, nearly healed. Dermatology follow up after discharge.     Communication with family:  Continue to keep parents updated.       Discharge planning/Health Maintenance:  1)  screens:  -pending                                    - in range  2) CCHD screen: TBD  3) Hearing screen: TBD  4) Carseat challenge: TBD  5) Immunizations:  Immunization History   Administered Date(s) Administered    None   Deferred Date(s) Deferred    Hep B, adolescent or pediatric 2024     Dipti Medrano MD [facility]    Note to Caregivers  The  [employer] Cures Act makes medical notes available to patients in the interest of transparency.  However, please be advised that this is a medical document.  It is intended as lktu-uj-masl communication.  It is written and medical language may contain abbreviations or verbiage that are technical and unfamiliar.  It may appear blunt or direct.  Medical documents are intended to carry relevant information, facts as evident, and the clinical opinion of the practitioner.   Candida infection

## 2024-01-17 ENCOUNTER — APPOINTMENT (OUTPATIENT)
Dept: GASTROENTEROLOGY | Facility: CLINIC | Age: 64
End: 2024-01-17

## 2024-03-11 ENCOUNTER — APPOINTMENT (OUTPATIENT)
Dept: ORTHOPEDIC SURGERY | Facility: CLINIC | Age: 64
End: 2024-03-11
Payer: MEDICARE

## 2024-03-11 VITALS
HEART RATE: 73 BPM | DIASTOLIC BLOOD PRESSURE: 82 MMHG | WEIGHT: 145 LBS | BODY MASS INDEX: 27.38 KG/M2 | SYSTOLIC BLOOD PRESSURE: 127 MMHG | HEIGHT: 61 IN | TEMPERATURE: 97.9 F

## 2024-03-11 DIAGNOSIS — M25.511 PAIN IN RIGHT SHOULDER: ICD-10-CM

## 2024-03-11 PROCEDURE — 99213 OFFICE O/P EST LOW 20 MIN: CPT

## 2024-03-11 NOTE — PHYSICAL EXAM
[de-identified] : General: Awake, alert, no acute distress, Patient was cooperative and appropriate during the examination.  The patient is of normal weight for height and age.  Walks without an antalgic gait.  Full, painless range of motion of the neck and back.  Exam of the bilateral lower extremities is intact and symmetric with regards to dermatologic, vascular, and neurologic exam. Bilateral lower extremity sensation is grossly intact to light touch in the DP/SP/T/S/S nerve distributions. Intact DF/PF/EHL. BIlateral lower extremities warm and well-perfused with brisk capillary refill.   Pulmonary: Regular, nonlabored breathing  Abdomen: Soft, nontender, nondistended.  Lymphatic: No evidence of axillary lymphadenopathy  Right shoulder Exam: Physical exam of the shoulder demonstrates normal skin without signs of skin changes or abnormalities. No erythema, warmth, or joint effusion appreciated.     Sensation intact light touch C5-T1 Palpable radial pulse Radial/ulnar/median/axillary/musculocutaneous/AIN/PIN nerves grossly intact   Range of motion: Forward Flexion: 180 Abduction: 150 External Rotation: 60 Internal Rotation: T7   Palpation: Not tender to palpation over the glenohumeral joint Exquisitely tender palpation over the rotator cuff insertion on the greater tuberosity Not tender to palpation over the AC joint Moderately tender to palpation of the biceps tendon/bicipital groove   Strength testing: Supraspinatus: 5-/5 Infraspinatus: 5-/5 Subscapularis: 5/5   Special test: Empty can test positive  Velez impingement test positive Speeds test negative Arroyo's test negative Lift-off test negative Bell-press test negative Cross-arm adduction test negative   [de-identified] : X-rays 4 views of the right shoulder taken in the office today on 12/5/2023 reveals no acute fracture or dislocation.  Moderate degenerative changes of the AC joint are noted.  No significant arthritis with good humeral joint

## 2024-03-11 NOTE — DISCUSSION/SUMMARY
[de-identified] : Assessment: 63-year-old female with right shoulder rotator cuff tendinitis; status post left shoulder arthroscopic rotator cuff repair, subacromial decompression, synovectomy, and open subpectoral biceps tenodesis on 1/18/2022  Plan: I had a long discussion with the patient today regarding the nature of their diagnosis and treatment plan. We discussed the risks and benefits of no treatment as well as nonoperative and operative treatments.  I reviewed the x-rays today that showed no acute bony pathology.  On examination today she has fairly well-preserved range of motion and strength.  At this time I recommend continued conservative treatment of the patient's condition with modalities including rest, ice, heat, anti-inflammatory medications, activity modifications, and home stretching and strengthening exercises.  A prescription for meloxicam was sent to the patient's pharmacy today.  I discussed with the patient the risks and benefits associated with NSAID use. GI precautions were discussed.  She will continue with physical therapy exercises at home on her own for symptomatic relief and to maintain range of motion.  Also due to the lack of improvement with conservative treatment I am recommending an MRI for further evaluation.  She will follow-up after the MRI is complete for further discussion.  We did discuss potential injection versus surgical invention at that time pending the MRI results.   The patient verbalizes their understanding and agrees with the plan.  All questions were answered to their satisfaction.

## 2024-03-11 NOTE — HISTORY OF PRESENT ILLNESS
[Shoulder Pain] : Shoulder Pain [de-identified] : 03/11/2024 : MARC CASTRO  is a 64 year  old female who presents to the office for follow-up evaluation of her right shoulder.  She is status post left shoulder arthroscopic rotator cuff repair, subacromial decompression, synovectomy and open biceps tenodesis on 1/18/2022.  She is doing very well with her left shoulder and states that her right shoulder pain is persisting despite physical therapy and medication over the last several weeks.  She is here for repeat evaluation to reassess.  She denies any numbness or tingling distally.  She has no other complaints today.  12/05/2023 : MARC CASTRO  is a 63 year  old female who presents to the office for evaluation of her right shoulder.  She is status post left shoulder arthroscopic rotator cuff repair, subacromial decompression, synovectomy, and open subpectoral biceps tenodesis on 1/18/2022.  She states she is doing very well with her left shoulder and had a left total wrist arthroplasty performed by Dr. Bustos approximately 1 year ago and she states she has been using the right arm more and attributes her pain in her right shoulder to this.  She states over the last 2 weeks the right shoulder has become very painful and she was unable to lift it the other day.  She states she is been resting and the pain is gone a little better however she still does get severe pain over the lateral aspect of the shoulder that radiates down the arm and is worse with certain positions and motions and alleviated with rest.  She is here for specialist opinion.  She denies any numbness or tingling distally.  She has no other complaints today.

## 2024-03-11 NOTE — REASON FOR VISIT
[Follow-Up Visit] : a follow-up visit for [Shoulder Pain] : shoulder pain [Other: ____] : [unfilled] [FreeTextEntry2] : Left shoulder

## 2024-03-19 ENCOUNTER — OUTPATIENT (OUTPATIENT)
Dept: OUTPATIENT SERVICES | Facility: HOSPITAL | Age: 64
LOS: 1 days | End: 2024-03-19

## 2024-03-19 ENCOUNTER — APPOINTMENT (OUTPATIENT)
Dept: MRI IMAGING | Facility: CLINIC | Age: 64
End: 2024-03-19
Payer: MEDICARE

## 2024-03-19 DIAGNOSIS — Z98.89 OTHER SPECIFIED POSTPROCEDURAL STATES: Chronic | ICD-10-CM

## 2024-03-19 DIAGNOSIS — K46.9 UNSPECIFIED ABDOMINAL HERNIA WITHOUT OBSTRUCTION OR GANGRENE: Chronic | ICD-10-CM

## 2024-03-19 DIAGNOSIS — S82.209P UNSPECIFIED FRACTURE OF SHAFT OF UNSPECIFIED TIBIA, SUBSEQUENT ENCOUNTER FOR CLOSED FRACTURE WITH MALUNION: Chronic | ICD-10-CM

## 2024-03-19 DIAGNOSIS — M25.511 PAIN IN RIGHT SHOULDER: ICD-10-CM

## 2024-03-19 DIAGNOSIS — Z96.651 PRESENCE OF RIGHT ARTIFICIAL KNEE JOINT: Chronic | ICD-10-CM

## 2024-03-19 DIAGNOSIS — Z90.49 ACQUIRED ABSENCE OF OTHER SPECIFIED PARTS OF DIGESTIVE TRACT: Chronic | ICD-10-CM

## 2024-03-19 PROCEDURE — 73221 MRI JOINT UPR EXTREM W/O DYE: CPT | Mod: 26,RT

## 2024-04-01 ENCOUNTER — APPOINTMENT (OUTPATIENT)
Dept: ORTHOPEDIC SURGERY | Facility: CLINIC | Age: 64
End: 2024-04-01
Payer: MEDICARE

## 2024-04-01 DIAGNOSIS — M25.511 PAIN IN RIGHT SHOULDER: ICD-10-CM

## 2024-04-01 PROCEDURE — 99214 OFFICE O/P EST MOD 30 MIN: CPT

## 2024-04-01 NOTE — HISTORY OF PRESENT ILLNESS
[Shoulder Pain] : Shoulder Pain [de-identified] : 4/1/2024: Marc is a pleasant 64-year-old female returns to the office today for follow-up of the right shoulder.  The patient states her symptoms are unchanged he continues to have pain in the right shoulder.  She recently had an MRI and comes in to discuss results today and any further recommendations.  She denies any fevers, chills, sweats, or pain elsewhere.  03/11/2024 : MARC CASTRO  is a 64 year  old female who presents to the office for follow-up evaluation of her right shoulder.  She is status post left shoulder arthroscopic rotator cuff repair, subacromial decompression, synovectomy and open biceps tenodesis on 1/18/2022.  She is doing very well with her left shoulder and states that her right shoulder pain is persisting despite physical therapy and medication over the last several weeks.  She is here for repeat evaluation to reassess.  She denies any numbness or tingling distally.  She has no other complaints today.  12/05/2023 : MARC CASTRO  is a 63 year  old female who presents to the office for evaluation of her right shoulder.  She is status post left shoulder arthroscopic rotator cuff repair, subacromial decompression, synovectomy, and open subpectoral biceps tenodesis on 1/18/2022.  She states she is doing very well with her left shoulder and had a left total wrist arthroplasty performed by Dr. Bustos approximately 1 year ago and she states she has been using the right arm more and attributes her pain in her right shoulder to this.  She states over the last 2 weeks the right shoulder has become very painful and she was unable to lift it the other day.  She states she is been resting and the pain is gone a little better however she still does get severe pain over the lateral aspect of the shoulder that radiates down the arm and is worse with certain positions and motions and alleviated with rest.  She is here for specialist opinion.  She denies any numbness or tingling distally.  She has no other complaints today.

## 2024-04-01 NOTE — PHYSICAL EXAM
[de-identified] : General: Awake, alert, no acute distress, Patient was cooperative and appropriate during the examination.  The patient is of normal weight for height and age.  Walks without an antalgic gait.  Right shoulder Exam: Physical exam of the shoulder demonstrates normal skin without signs of skin changes or abnormalities. No erythema, warmth, or joint effusion appreciated.     Sensation intact light touch C5-T1 Palpable radial pulse Radial/ulnar/median/axillary/musculocutaneous/AIN/PIN nerves grossly intact   Range of motion: Forward Flexion: 180 Abduction: 150 External Rotation: 60 Internal Rotation: T7   Palpation: Not tender to palpation over the glenohumeral joint Exquisitely tender palpation over the rotator cuff insertion on the greater tuberosity Not tender to palpation over the AC joint Moderately tender to palpation of the biceps tendon/bicipital groove   Strength testing: Supraspinatus: 4+/5 Infraspinatus: 5/5 Subscapularis: 5/5   Special test: Empty can test positive  Velez impingement test positive Speeds test negative Henderson's test negative Lift-off test negative Bell-press test negative Cross-arm adduction test negative   [de-identified] : MRI of the right shoulder from a [facility] imaging facility on 3/19/2024 was reviewed with the patient today in the office: -Moderate to severe tendinosis of the infraspinatus and supraspinatus tendons with full-thickness partial width tearing of the posterior supraspinatus fibers (0.7 x 0.9 cm). -Large subcoracoid bursitis and mild subacromial/subdeltoid bursitis. -Moderate to severe acromioclavicular joint arthrosis. -Subcortical cyst with surrounding edema within the anterior aspect of the greater tuberosity.  X-rays 4 views of the right shoulder taken in the office today on 12/5/2023 reveals no acute fracture or dislocation.  Moderate degenerative changes of the AC joint are noted.  No significant arthritis with good humeral joint

## 2024-04-01 NOTE — DISCUSSION/SUMMARY
[de-identified] : Assessment: 64-year-old female with right shoulder pain secondary to full-thickness rotator cuff tear   Plan: I had a long discussion with the patient today regarding the nature of their diagnosis and treatment plan.  I reviewed the patient's imaging today with him in the office which demonstrates a full-thickness tear of the rotator cuff in the setting of subacromial impingement, biceps tendinopathy, and bursitis.  We discussed the risks and benefits of no treatment as well as nonoperative and operative treatments.  Nonsurgical treatment would include modalities such as resting, icing, heating, stretching, anti-inflammatory medicines as needed, activity/lifestyle modifications, physical therapy, possible cortisone injections, and a gradual return to activities as tolerated.  The benefits of nonsurgical treatment are that it may improve some of the patient's symptoms while avoiding the risks and rehabilitation associated with surgery.  The disadvantages of nonsurgical treatment are that they may incompletely alleviate the patient's symptoms.  Full-thickness rotator cuff tears, if treated nonsurgically, have the potential to get larger over time which could lead to worsening pain and dysfunction of the shoulder.  Furthermore, large, full-thickness rotator cuff tears that become more chronic or associated with muscular atrophy can become more difficult, if not impossible, to repair primarily which could necessitate larger and more invasive surgeries.    Surgical treatment would include a right shoulder arthroscopic rotator cuff repair, subacromial decompression, synovectomy, and possible open subpectoral biceps tenodesis.  The benefits of surgical treatment include improved shoulder pain and function.  The risks of surgery include but are not limited to infection, blood loss, blood clots, neurovascular injury, stiffness/loss of range of motion, persistent pain, progressive arthritis, fracture, instability, failure of hardware, failure of the tendon(s) to heal, anesthesia related complications including paralysis and death, and the need for further surgery in the future.  Postoperatively the patient will be nonweightbearing in a sling for a minimum of 6 weeks.  They will be required to use the sling at all times except for hygiene, pendulum exercises, elbow/hand/wrist exercises, and physical therapy.  They will not be permitted to drive while wearing the sling and/or while taking narcotic pain medications.  Physical therapy after surgery is generally recommended for 2 to 3 days/week and will continue for a minimum of 4 to 6 months after postoperatively.  I expect most patients to return to unrestricted activities 6 months postoperatively although some may take longer to fully recover. Noncompliance with any postoperative restrictions and/or physical therapy could lead to a suboptimal outcome or surgical failure.  The patient verbalizes their understanding of all surgical risks as well as the anticipated postoperative course.  The patient is interested in pursuing surgery but does not wish to do so until after the summer.  She would like to schedule for sometime in September.  She will follow-up in July or August for reassessment and further discussion and potentially scheduling in the follow-up.  Until then she will continue with symptomatic management on her own.  The patient verbalizes their understanding and agrees with this plan.  All questions were answered to their satisfaction.

## 2024-05-10 ENCOUNTER — APPOINTMENT (OUTPATIENT)
Dept: ORTHOPEDIC SURGERY | Facility: CLINIC | Age: 64
End: 2024-05-10
Payer: MEDICARE

## 2024-05-10 VITALS
WEIGHT: 150 LBS | SYSTOLIC BLOOD PRESSURE: 130 MMHG | DIASTOLIC BLOOD PRESSURE: 83 MMHG | HEIGHT: 61 IN | BODY MASS INDEX: 28.32 KG/M2 | HEART RATE: 58 BPM

## 2024-05-10 DIAGNOSIS — Z96.651 PRESENCE OF RIGHT ARTIFICIAL KNEE JOINT: ICD-10-CM

## 2024-05-10 PROCEDURE — G2211 COMPLEX E/M VISIT ADD ON: CPT

## 2024-05-10 PROCEDURE — 99213 OFFICE O/P EST LOW 20 MIN: CPT

## 2024-05-10 PROCEDURE — 73562 X-RAY EXAM OF KNEE 3: CPT | Mod: RT

## 2024-05-10 NOTE — PHYSICAL EXAM
[de-identified] : The patient appears well nourished and in no apparent distress.  The patient is alert and oriented to person, place, and time.   Affect and mood appear normal. The head is normocephalic and atraumatic.  The eyes reveal normal sclera and extra ocular muscles are intact. The tongue is midline with no apparent lesions.  Skin shows normal turgor with no evidence of eczema or psoriasis.  No respiratory distress noted.  Sensation grossly intact.		  [de-identified] : Exam of the right knee shows a well healed incision, less than 2 mm laxity MCL and LCL in extension, 0 to 120 degrees of flexion measured with a goniometer. There is no instability.  5/5 motor strength bilaterally distally. Sensation intact distally.		  [de-identified] : X-ray: 3 views of the right knee demonstrate a total knee replacement in good alignment with a well centered patella, without evidence of loosening or subsidence, and no fracture.

## 2024-05-10 NOTE — HISTORY OF PRESENT ILLNESS
[de-identified] : Ms. MARC CASTRO is a 64 year old female status post right TKR revision 3/7/17, presenting for evaluation of right knee buckling x 2 weeks. She denies any falls, but states that two weeks ago she had a few episodes of buckling and giving out of the left knee. This week it has not happened. She denies any pain or swelling. Patient denies any fever or chills. She has not had recent PT. She presents to make sure the TKR is stable.

## 2024-05-10 NOTE — ADDENDUM
[FreeTextEntry1] : This note was authored by ELICIA PATTERSON working as a medical scribe for Dr. Ramin Heredia. The note was reviewed, edited, and revised by Dr. Ramin Heredia whom is in agreement with the exam findings, imaging findings, and treatment plan. 05/10/2024

## 2024-05-10 NOTE — DISCUSSION/SUMMARY
[de-identified] : MARC CASTRO is a 64 year old female who presents s/p right total knee replacement. On imaging the patient's implants appear stable and well fixed without signs of loosening or fracture. Exam of her right knee was benign and the patient was reassured to hear there are no clinical issues with her implants at this time. She understands that her right leg buckling may be due to spinal etiology as she had a recent spine injection that coincides with the onset of her leg weakness. She was also encouraged to wear a knee sleeve as this may help improve her subjective feeling of knee stability via proprioception.

## 2024-06-12 ENCOUNTER — APPOINTMENT (OUTPATIENT)
Dept: ORTHOPEDIC SURGERY | Facility: CLINIC | Age: 64
End: 2024-06-12
Payer: MEDICARE

## 2024-06-12 VITALS
WEIGHT: 153 LBS | DIASTOLIC BLOOD PRESSURE: 83 MMHG | SYSTOLIC BLOOD PRESSURE: 132 MMHG | HEIGHT: 61 IN | BODY MASS INDEX: 28.89 KG/M2

## 2024-06-12 DIAGNOSIS — M72.0 PALMAR FASCIAL FIBROMATOSIS [DUPUYTREN]: ICD-10-CM

## 2024-06-12 DIAGNOSIS — Z87.891 PERSONAL HISTORY OF NICOTINE DEPENDENCE: ICD-10-CM

## 2024-06-12 DIAGNOSIS — M19.039 PRIMARY OSTEOARTHRITIS, UNSPECIFIED WRIST: ICD-10-CM

## 2024-06-12 PROCEDURE — 99214 OFFICE O/P EST MOD 30 MIN: CPT | Mod: 25

## 2024-06-12 PROCEDURE — 20605 DRAIN/INJ JOINT/BURSA W/O US: CPT | Mod: LT

## 2024-06-12 NOTE — ASSESSMENT
[FreeTextEntry1] : ASSESSMENT: The patient comes in today with status post left-sided proximal row carpectomy with allograft resurfacing.  We have discussed her current findings and at this point in time she demonstrates continuous tremendous improvement.  She does however have some discomfort at the DRUJ at this point.  We have discussed an injection.  She understands that there are options including distal ulnar resection however this can make her weaker.  She does not want this. We have also discussed the pathophysiology of Dupuytren's disease.  With this in mind she would like to observe the condition  The patient was adequately and thoroughly informed of my assessment of their current condition(s).  - This may diminish bodily function for the extremity. We discussed prognosis, treatment modalities including operative and nonoperative options for the above diagnostic assessment. [For this I was able to review other physician's note(s) including reviewing other tests, imaging results as well as personally view these results for my own interpretation.]   The patient was adequately and thoroughly informed of my assessment of their current condition(s).   Injection:  The risks and benefits of a steroid injection were discussed in detail. The risks include but are not limited to: pain, infection, swelling, flare response, bleeding, subcutaneous fat atrophy, skin depigmentation and/or elevation of blood sugar. The risk of incomplete resolution of symptoms, recurrence and additional intervention was reviewed and considered by the patient.  The patient agreed to proceed and under a sterile prep, I injected 1 unit into 1 cc of a combination of Celestone and Lidocaine into the left wrist DRUJ joint. The patient tolerated the injection well.  DISCUSSION: 1.  Injection as above.  Bracing as needed.  Follow-up as needed as requested

## 2024-06-12 NOTE — HISTORY OF PRESENT ILLNESS
[FreeTextEntry1] : Sara returns for postoperative follow-up status post left distal radius arthroplasty.  She has done well from this however she continues to suffer from left wrist DRUJ related discomfort.  Prior injection and bracing has been very helpful She does present with a mass at the left palmar hand

## 2024-06-12 NOTE — PHYSICAL EXAM
[de-identified] : She is well-appearing on examination. Examination of the left wrist reveals an incision that has healed well. There is mild swelling at the level of the wrist which is improved as compared to prior visit.. She has about 55 degrees of flexion extension. There is no crepitus with motion. She has full digital motion There is mild discomfort with compression of the DRUJ. Examination of the left palmar hand also reveals a Dupuytren's nodule and cord.  It is not causing significant joint contracture

## 2024-07-08 ENCOUNTER — NON-APPOINTMENT (OUTPATIENT)
Age: 64
End: 2024-07-08

## 2024-07-08 VITALS — HEIGHT: 61 IN | WEIGHT: 150 LBS | BODY MASS INDEX: 28.32 KG/M2

## 2024-07-08 DIAGNOSIS — Z87.891 PERSONAL HISTORY OF NICOTINE DEPENDENCE: ICD-10-CM

## 2024-07-14 ENCOUNTER — OUTPATIENT (OUTPATIENT)
Dept: OUTPATIENT SERVICES | Facility: HOSPITAL | Age: 64
LOS: 1 days | End: 2024-07-14
Payer: MEDICARE

## 2024-07-14 DIAGNOSIS — S82.209P UNSPECIFIED FRACTURE OF SHAFT OF UNSPECIFIED TIBIA, SUBSEQUENT ENCOUNTER FOR CLOSED FRACTURE WITH MALUNION: Chronic | ICD-10-CM

## 2024-07-14 DIAGNOSIS — Z00.8 ENCOUNTER FOR OTHER GENERAL EXAMINATION: ICD-10-CM

## 2024-07-14 DIAGNOSIS — Z98.89 OTHER SPECIFIED POSTPROCEDURAL STATES: Chronic | ICD-10-CM

## 2024-07-14 DIAGNOSIS — Z90.49 ACQUIRED ABSENCE OF OTHER SPECIFIED PARTS OF DIGESTIVE TRACT: Chronic | ICD-10-CM

## 2024-07-14 DIAGNOSIS — K46.9 UNSPECIFIED ABDOMINAL HERNIA WITHOUT OBSTRUCTION OR GANGRENE: Chronic | ICD-10-CM

## 2024-07-14 DIAGNOSIS — Z96.651 PRESENCE OF RIGHT ARTIFICIAL KNEE JOINT: Chronic | ICD-10-CM

## 2024-07-14 PROCEDURE — 70553 MRI BRAIN STEM W/O & W/DYE: CPT | Mod: MH

## 2024-07-14 PROCEDURE — A9585: CPT

## 2024-07-29 ENCOUNTER — OUTPATIENT (OUTPATIENT)
Dept: OUTPATIENT SERVICES | Facility: HOSPITAL | Age: 64
LOS: 1 days | End: 2024-07-29
Payer: MEDICARE

## 2024-07-29 ENCOUNTER — APPOINTMENT (OUTPATIENT)
Dept: CT IMAGING | Facility: CLINIC | Age: 64
End: 2024-07-29

## 2024-07-29 DIAGNOSIS — Z98.89 OTHER SPECIFIED POSTPROCEDURAL STATES: Chronic | ICD-10-CM

## 2024-07-29 DIAGNOSIS — S82.209P UNSPECIFIED FRACTURE OF SHAFT OF UNSPECIFIED TIBIA, SUBSEQUENT ENCOUNTER FOR CLOSED FRACTURE WITH MALUNION: Chronic | ICD-10-CM

## 2024-07-29 DIAGNOSIS — Z87.891 PERSONAL HISTORY OF NICOTINE DEPENDENCE: ICD-10-CM

## 2024-07-29 DIAGNOSIS — K46.9 UNSPECIFIED ABDOMINAL HERNIA WITHOUT OBSTRUCTION OR GANGRENE: Chronic | ICD-10-CM

## 2024-07-29 DIAGNOSIS — Z90.49 ACQUIRED ABSENCE OF OTHER SPECIFIED PARTS OF DIGESTIVE TRACT: Chronic | ICD-10-CM

## 2024-07-29 DIAGNOSIS — Z96.651 PRESENCE OF RIGHT ARTIFICIAL KNEE JOINT: Chronic | ICD-10-CM

## 2024-07-29 PROCEDURE — 71271 CT THORAX LUNG CANCER SCR C-: CPT

## 2024-07-29 PROCEDURE — 71271 CT THORAX LUNG CANCER SCR C-: CPT | Mod: 26

## 2024-08-05 PROBLEM — R91.8 PULMONARY NODULES/LESIONS, MULTIPLE: Status: ACTIVE | Noted: 2024-08-05

## 2024-08-09 NOTE — ED ADULT NURSE REASSESSMENT NOTE - NS ED NURSE REASSESS COMMENT FT1
pt d/c in stable condition, no apparent distress noted at this time. pt A&Ox3. pt able to ambulate with steady gait. pt in no distress at d/c. beer/liquor

## 2024-08-12 ENCOUNTER — EMERGENCY (EMERGENCY)
Facility: HOSPITAL | Age: 64
LOS: 1 days | Discharge: HOME | End: 2024-08-12
Attending: EMERGENCY MEDICINE
Payer: MEDICARE

## 2024-08-12 VITALS
OXYGEN SATURATION: 98 % | RESPIRATION RATE: 20 BRPM | TEMPERATURE: 98 F | SYSTOLIC BLOOD PRESSURE: 123 MMHG | HEART RATE: 76 BPM | WEIGHT: 149.91 LBS | DIASTOLIC BLOOD PRESSURE: 83 MMHG | HEIGHT: 61 IN

## 2024-08-12 DIAGNOSIS — K46.9 UNSPECIFIED ABDOMINAL HERNIA WITHOUT OBSTRUCTION OR GANGRENE: Chronic | ICD-10-CM

## 2024-08-12 DIAGNOSIS — Z98.89 OTHER SPECIFIED POSTPROCEDURAL STATES: Chronic | ICD-10-CM

## 2024-08-12 DIAGNOSIS — Z90.49 ACQUIRED ABSENCE OF OTHER SPECIFIED PARTS OF DIGESTIVE TRACT: Chronic | ICD-10-CM

## 2024-08-12 DIAGNOSIS — S82.209P UNSPECIFIED FRACTURE OF SHAFT OF UNSPECIFIED TIBIA, SUBSEQUENT ENCOUNTER FOR CLOSED FRACTURE WITH MALUNION: Chronic | ICD-10-CM

## 2024-08-12 DIAGNOSIS — Z96.651 PRESENCE OF RIGHT ARTIFICIAL KNEE JOINT: Chronic | ICD-10-CM

## 2024-08-12 PROCEDURE — 99284 EMERGENCY DEPT VISIT MOD MDM: CPT

## 2024-08-12 PROCEDURE — 93971 EXTREMITY STUDY: CPT

## 2024-08-12 PROCEDURE — 99284 EMERGENCY DEPT VISIT MOD MDM: CPT | Mod: 25

## 2024-08-12 PROCEDURE — 93971 EXTREMITY STUDY: CPT | Mod: 26,RT

## 2024-08-12 NOTE — ED PROVIDER NOTE - NSICDXFAMILYHX_GEN_ALL_CORE_FT
FAMILY HISTORY:  Family history of peritoneal cancer  Family history of prostate cancer    Child  Still living? Yes, Estimated age: 21-30  Family history of Hodgkin's lymphoma, Age at diagnosis: 11-20

## 2024-08-12 NOTE — ED PROVIDER NOTE - OBJECTIVE STATEMENT
64y female w/ pmh of HLD, breast CA on anastrazole, and DVT not on blood thinners presenting with sudden onset right wrist pain, swelling, and blue discoloration that occurred just PTA. denies any trauma. denies CP, SOB. denies any pain more proximal in the arm.

## 2024-08-12 NOTE — ED PROVIDER NOTE - NSICDXPASTMEDICALHX_GEN_ALL_CORE_FT
PAST MEDICAL HISTORY:  Anxiety     Colon cancer     Dyslipidemia     Neuropathy     Rheumatoid arthritis

## 2024-08-12 NOTE — ED ADULT NURSE NOTE - NSFALLUNIVINTERV_ED_ALL_ED
Bed/Stretcher in lowest position, wheels locked, appropriate side rails in place/Call bell, personal items and telephone in reach/Instruct patient to call for assistance before getting out of bed/chair/stretcher/Non-slip footwear applied when patient is off stretcher/[place] to call system/Physically safe environment - no spills, clutter or unnecessary equipment/Purposeful proactive rounding/Room/bathroom lighting operational, light cord in reach

## 2024-08-12 NOTE — ED PROVIDER NOTE - CHIEF COMPLAINT
Labor & Delivery History & Physical  Patient Name:  Hazel Ponce   MRN:  [id number]  Age:  27 year old    YOB: 1993      Subjective:    Hazel Ponce is a 27 year old  female with ADILENE: 6/3/2021, by Patient Reported,  Gestational Age: 24w5d who presents for worsening pain in setting of known nephrolithiasis.  She was diagnosed with right sided non-obstructing kidney stones yesterday.  Her pain is mostly along LLQ and left flank.  Pain initially started about 2 days ago; and has been worse since; pain is now mostly LLQ instead of left flank.  Pain is not relieved by oxycodone + tylenol that was prescribed yesterday.  Denies fevers, chills, hematuria, dysuria.  She does report vomiting after dinner yesterday, which she has not done throughout pregnancy.  She reports mild baseline nausea, but this is not a new symptom.    Patient denies leaking of fluid or vaginal bleeding.  Fetal Movement: present.       Prenatal care complicated by:  - Mood disorder: cymbalta 60mg. Mood stable, no SI/HI  - h/o MRSA: neg 10/2020  - Hep B nonimmune: declines vaccine   - B neg: Rhogam to be given at 28wga and postpartum      Pregnancy Episode Problems (from 02/15/21 to present)     Problem Noted Resolved    Nephrolithiasis 2/15/2021 by Jory Rangel MD No    Priority:  Medium          OB History    Para Term  AB Living   1 0 0 0 0 0   SAB TAB Ectopic Multiple Live Births   0 0 0 0 0      # Outcome Date GA Lbr Reji/2nd Weight Sex Delivery Anes PTL Lv   1 Current              Past Medical History:   Diagnosis Date     Depressive disorder      MRSA (methicillin resistant staph aureus) culture positive 2006    Knee     No past surgical history on file.  Social History     Tobacco Use     Smoking status: Former Smoker     Smokeless tobacco: Never Used   Substance Use Topics     Alcohol use: Not Currently     Drug use: No      History   Drug Use No     No family history on file.  [unfilled]    Medications Prior to Admission   Medication Sig Dispense Refill Last Dose     acetaminophen (TYLENOL) 325 MG tablet Take 2 tablets (650 mg) by mouth every 4 hours as needed for mild pain or fever 30 tablet 0      amoxicillin-clavulanate (AUGMENTIN) 875-125 MG per tablet Take 1 tablet by mouth 2 times daily        cholecalciferol 50 MCG (2000 UT) CAPS Take 2,000 Units by mouth daily        diphenhydrAMINE (BENADRYL) 25 MG capsule Take 1 capsule (25 mg) by mouth every 6 hours as needed for other (pain) 60 capsule 0      DULoxetine (CYMBALTA) 60 MG capsule Take 60 mg by mouth daily        ondansetron (ZOFRAN) 4 MG tablet Take 2 tablets (8 mg) by mouth every 8 hours as needed for nausea or vomiting 30 tablet 0      oxyCODONE (ROXICODONE) 5 MG tablet Take 1 tablet (5 mg) by mouth every 4 hours as needed for severe pain 5 tablet 0      Prenatal Vit-Fe Fumarate-FA (PRENATAL PLUS) 27-1 MG TABS Take 1 tablet by mouth daily        prochlorperazine (COMPAZINE) 25 MG suppository Place 1 suppository (25 mg) rectally every 12 hours as needed for nausea or vomiting 14 suppository 0      promethazine (PHENERGAN) 12.5 MG tablet Take 2 tablets (25 mg) by mouth every 6 hours as needed for nausea 14 tablet 0      tamsulosin (FLOMAX) 0.4 MG capsule Take 1 capsule (0.4 mg) by mouth daily for 7 days 7 capsule 0        There is no immunization history on file for this patient.    Review of Systems - NEGATIVE FOR  Fevers  Chills  Headache  Visual changes  Ear pain  Sore throat  Cough  Shortness of breath  Chest pain  Palpitations  Constipation  Diarrhea  Vomiting  Bloody stools  Hematuria  Dysuria  Muscle weakness  Gait disturbance    Objective:     0 lbs 0 oz      General: General appearance: NAD.   Lungs:   unlabored   Heart:     Abdomen: Soft, gravid, no rebound or guarding.  Mildly TTP in LLQ  No CVA tenderness   [place]: Contraction Frequency (min): none  Contraction Duration (sec):     FHT: Baseline 140 BPM   Fetal heart  variability:moderate  Fetal heart rate accelerations:  Present 10 x 10  Fetal heart rate decelerations: none  Fetal heart rate interpretation:  Category I   Speculum:  deferred   Cervix:  deferred    Extremities: Trace to 1+ edema bilateral, symmetric edema; neg Ariella's sign      Lab Review:    No results found for: HGB, HCT, RPR, HEPBSAG, TSH    Ultrasound:  pending    Assessment  Hazel Ponce is a 27 year old  female with ADILENE: 6/3/2021, by Patient Reported,  Gestational Age: 24w5d who presents with worsening pain in setting of right nephrolithiasis.    Plan  - Nephrolithiasis: CBC, CMP, and renal US ordered. Will f/u on labs/US and consider Uro consult. No concern for infection at this time.  Will give IVF bolus.   - Fetal assessment: 20 minute FHT not yet completed, as she had to go down to US.  However, FHR normal with no contractions.  No sign of fetal distress. Will complete NST upon return from US.  No obstetric concerns at this time.   - Mood disorder: cymbalta 60mg. Mood stable, no SI/HI  - h/o MRSA: neg 10/2020  - Hep B nonimmune: declines vaccine   - B neg: Rhogam to be given at 28wga and postpartum     The patient is a 64y Female complaining of arm pain/injury.

## 2024-08-12 NOTE — ED PROVIDER NOTE - ATTENDING CONTRIBUTION TO CARE
Minh: I performed a face to face evaluation of this patient and performed a full history and physical examination on the patient.  I agree with the resident's history, physical examination, and plan of the patient unless otherwise noted. My brief assessment is as follows: hx breast cancer on anastrazole, dvt not on a/c p/w sudden swelling/blue discoloration to right distal wrist just PTA. no numbness/tingling. no cp/sob. no other complaints. non toxic, nad, ctab, rrr, abd benign, blue/ecchymosis with some ttp to distal wrist, nl radial pulse and cap refill, no swelling/blueness to hand. full rom. will check doppler. pain meds, reassess

## 2024-08-12 NOTE — ED PROVIDER NOTE - CLINICAL SUMMARY MEDICAL DECISION MAKING FREE TEXT BOX
64y female w/ pmh of HLD, breast CA on anastrazole, and DVT not on blood thinners presenting with sudden onset right wrist pain, swelling, and blue discoloration that occurred just PTA. denies any trauma. denies CP, SOB. denies any pain more proximal in the arm. 64y female w/ pmh of HLD, breast CA on anastrazole, and DVT not on blood thinners presenting with sudden onset right wrist pain, swelling, and blue discoloration that occurred just PTA. patient with ecchymotic and edematous area to volar right wrist. findings consistent with contusion or bruise however patient denies trauma. doppler US ordered to r/o DVT given hx of CA and blood clots.

## 2024-08-12 NOTE — ED PROVIDER NOTE - NSICDXPASTSURGICALHX_GEN_ALL_CORE_FT
PAST SURGICAL HISTORY:  Abdominal hernia     Closed fracture of tibia with malunion s/p surgical repair; hardware removed 11/2016    H/O excision of mass cysts right hand, R shoulder, L leg, R leg    H/O total knee replacement, right 2016    History of cholecystectomy     History of colon surgery     S/P arthroscopic knee surgery bilat    S/P carpal tunnel release bilat    Status post left breast lumpectomy

## 2024-08-12 NOTE — ED PROVIDER NOTE - PHYSICAL EXAMINATION
General: Awake, alert, lying in bed in NAD  HEENT: Normocephalic, atraumatic. No scleral icterus or conjunctival injection. EOMI. Moist mucous membranes. Oropharynx clear.   Neck:. Soft and supple.  Cardiac: RRR, Peripheral pulses 2+ and symmetric. No LE edema.  Resp: Lungs CTAB. No accessory muscle use  Abd: Soft, non-tender, non-distended. No guarding, rebound, or rigidity.  Back: Spine midline and non-tender.   Skin: tender, edematous, bluish strip to right volar wrist.   Neuro: AO x 4. Moves all extremities symmetrically. Motor strength and sensation grossly intact.  Psych: Appropriate mood and affect General: Awake, alert, lying in bed in NAD  HEENT: Normocephalic, atraumatic. No scleral icterus or conjunctival injection. EOMI. Moist mucous membranes. Oropharynx clear.   Neck:. Soft and supple.  Cardiac: RRR, Peripheral pulses 2+ and symmetric. No LE edema.  Resp: Lungs CTAB. No accessory muscle use  Abd: Soft, non-tender, non-distended. No guarding, rebound, or rigidity.  Back: Spine midline and non-tender.   Skin: tender, edematous, ecchymotic strip to right volar wrist.   Neuro: AO x 4. Moves all extremities symmetrically. Motor strength and sensation grossly intact.  Psych: Appropriate mood and affect

## 2024-08-12 NOTE — ED PROVIDER NOTE - PROGRESS NOTE DETAILS
ultrasound negative for DVT. patient remains neurovascularly intact. symptomatic care instructions given. patient to follow up with PCP. return precautions given. stable for discharge.

## 2024-08-12 NOTE — ED PROVIDER NOTE - PATIENT PORTAL LINK FT
You can access the FollowMyHealth Patient Portal offered by [facility] by registering at the following website: [email or web address]. By joining [employer]’s FollowMyHealth portal, you will also be able to view your health information using other applications (apps) compatible with our system.

## 2024-08-12 NOTE — ED PROVIDER NOTE - NSFOLLOWUPINSTRUCTIONS_ED_ALL_ED_FT
use tylenol and motrin as needed for pain.   follow up with your PCP.  return for worsening pain, swelling, fever

## 2024-08-19 ENCOUNTER — APPOINTMENT (OUTPATIENT)
Dept: ORTHOPEDIC SURGERY | Facility: CLINIC | Age: 64
End: 2024-08-19

## 2024-10-22 NOTE — CONSULT NOTE ADULT - PROBLEM/RECOMMENDATION-3
DISPLAY PLAN FREE TEXT
Alert-The patient is alert, awake and responds to voice. The patient is oriented to time, place, and person. The triage nurse is able to obtain subjective information.

## 2024-11-15 ENCOUNTER — APPOINTMENT (OUTPATIENT)
Dept: PULMONOLOGY | Facility: CLINIC | Age: 64
End: 2024-11-15

## 2025-01-06 ENCOUNTER — NON-APPOINTMENT (OUTPATIENT)
Age: 65
End: 2025-01-06

## 2025-01-07 ENCOUNTER — APPOINTMENT (OUTPATIENT)
Dept: ORTHOPEDIC SURGERY | Facility: CLINIC | Age: 65
End: 2025-01-07
Payer: MEDICARE

## 2025-01-07 ENCOUNTER — OUTPATIENT (OUTPATIENT)
Dept: OUTPATIENT SERVICES | Facility: HOSPITAL | Age: 65
LOS: 1 days | End: 2025-01-07

## 2025-01-07 ENCOUNTER — APPOINTMENT (OUTPATIENT)
Dept: MRI IMAGING | Facility: CLINIC | Age: 65
End: 2025-01-07
Payer: MEDICARE

## 2025-01-07 VITALS
HEART RATE: 69 BPM | DIASTOLIC BLOOD PRESSURE: 75 MMHG | BODY MASS INDEX: 26.43 KG/M2 | SYSTOLIC BLOOD PRESSURE: 117 MMHG | HEIGHT: 61 IN | WEIGHT: 140 LBS

## 2025-01-07 DIAGNOSIS — Z98.89 OTHER SPECIFIED POSTPROCEDURAL STATES: Chronic | ICD-10-CM

## 2025-01-07 DIAGNOSIS — Z96.651 PRESENCE OF RIGHT ARTIFICIAL KNEE JOINT: Chronic | ICD-10-CM

## 2025-01-07 DIAGNOSIS — Z90.49 ACQUIRED ABSENCE OF OTHER SPECIFIED PARTS OF DIGESTIVE TRACT: Chronic | ICD-10-CM

## 2025-01-07 DIAGNOSIS — M25.511 PAIN IN RIGHT SHOULDER: ICD-10-CM

## 2025-01-07 DIAGNOSIS — K46.9 UNSPECIFIED ABDOMINAL HERNIA WITHOUT OBSTRUCTION OR GANGRENE: Chronic | ICD-10-CM

## 2025-01-07 PROCEDURE — 99214 OFFICE O/P EST MOD 30 MIN: CPT

## 2025-01-07 PROCEDURE — 73221 MRI JOINT UPR EXTREM W/O DYE: CPT | Mod: 26,RT

## 2025-01-14 ENCOUNTER — APPOINTMENT (OUTPATIENT)
Dept: ORTHOPEDIC SURGERY | Facility: CLINIC | Age: 65
End: 2025-01-14
Payer: MEDICARE

## 2025-01-14 VITALS
HEART RATE: 68 BPM | DIASTOLIC BLOOD PRESSURE: 89 MMHG | BODY MASS INDEX: 26.43 KG/M2 | SYSTOLIC BLOOD PRESSURE: 147 MMHG | HEIGHT: 61 IN | WEIGHT: 140 LBS

## 2025-01-14 DIAGNOSIS — M25.511 PAIN IN RIGHT SHOULDER: ICD-10-CM

## 2025-01-14 PROCEDURE — 99214 OFFICE O/P EST MOD 30 MIN: CPT

## 2025-01-17 ENCOUNTER — NON-APPOINTMENT (OUTPATIENT)
Age: 65
End: 2025-01-17

## 2025-01-29 ENCOUNTER — APPOINTMENT (OUTPATIENT)
Dept: CT IMAGING | Facility: CLINIC | Age: 65
End: 2025-01-29

## 2025-01-29 ENCOUNTER — OUTPATIENT (OUTPATIENT)
Dept: OUTPATIENT SERVICES | Facility: HOSPITAL | Age: 65
LOS: 1 days | End: 2025-01-29
Payer: MEDICARE

## 2025-01-29 DIAGNOSIS — Z90.49 ACQUIRED ABSENCE OF OTHER SPECIFIED PARTS OF DIGESTIVE TRACT: Chronic | ICD-10-CM

## 2025-01-29 DIAGNOSIS — Z98.89 OTHER SPECIFIED POSTPROCEDURAL STATES: Chronic | ICD-10-CM

## 2025-01-29 DIAGNOSIS — Z87.891 PERSONAL HISTORY OF NICOTINE DEPENDENCE: ICD-10-CM

## 2025-01-29 PROCEDURE — 71250 CT THORAX DX C-: CPT

## 2025-01-29 PROCEDURE — 71250 CT THORAX DX C-: CPT | Mod: 26

## 2025-01-31 ENCOUNTER — OUTPATIENT (OUTPATIENT)
Dept: OUTPATIENT SERVICES | Facility: HOSPITAL | Age: 65
LOS: 1 days | End: 2025-01-31
Payer: MEDICARE

## 2025-01-31 DIAGNOSIS — Z98.89 OTHER SPECIFIED POSTPROCEDURAL STATES: Chronic | ICD-10-CM

## 2025-01-31 DIAGNOSIS — Z90.49 ACQUIRED ABSENCE OF OTHER SPECIFIED PARTS OF DIGESTIVE TRACT: Chronic | ICD-10-CM

## 2025-01-31 DIAGNOSIS — K46.9 UNSPECIFIED ABDOMINAL HERNIA WITHOUT OBSTRUCTION OR GANGRENE: Chronic | ICD-10-CM

## 2025-01-31 DIAGNOSIS — Z96.651 PRESENCE OF RIGHT ARTIFICIAL KNEE JOINT: Chronic | ICD-10-CM

## 2025-01-31 DIAGNOSIS — Z01.818 ENCOUNTER FOR OTHER PREPROCEDURAL EXAMINATION: ICD-10-CM

## 2025-01-31 DIAGNOSIS — S82.209P UNSPECIFIED FRACTURE OF SHAFT OF UNSPECIFIED TIBIA, SUBSEQUENT ENCOUNTER FOR CLOSED FRACTURE WITH MALUNION: Chronic | ICD-10-CM

## 2025-01-31 LAB
A1C WITH ESTIMATED AVERAGE GLUCOSE RESULT: 5.3 % — SIGNIFICANT CHANGE UP (ref 4–5.6)
ANION GAP SERPL CALC-SCNC: 14 MMOL/L — SIGNIFICANT CHANGE UP (ref 5–17)
BASOPHILS # BLD AUTO: 0.05 K/UL — SIGNIFICANT CHANGE UP (ref 0–0.2)
BASOPHILS NFR BLD AUTO: 0.9 % — SIGNIFICANT CHANGE UP (ref 0–2)
BUN SERPL-MCNC: 15.6 MG/DL — SIGNIFICANT CHANGE UP (ref 8–20)
CALCIUM SERPL-MCNC: 9.1 MG/DL — SIGNIFICANT CHANGE UP (ref 8.4–10.5)
CHLORIDE SERPL-SCNC: 106 MMOL/L — SIGNIFICANT CHANGE UP (ref 96–108)
CO2 SERPL-SCNC: 24 MMOL/L — SIGNIFICANT CHANGE UP (ref 22–29)
CREAT SERPL-MCNC: 0.51 MG/DL — SIGNIFICANT CHANGE UP (ref 0.5–1.3)
EGFR: 105 ML/MIN/1.73M2 — SIGNIFICANT CHANGE UP
EOSINOPHIL # BLD AUTO: 0.06 K/UL — SIGNIFICANT CHANGE UP (ref 0–0.5)
EOSINOPHIL NFR BLD AUTO: 1 % — SIGNIFICANT CHANGE UP (ref 0–6)
ESTIMATED AVERAGE GLUCOSE: 105 MG/DL — SIGNIFICANT CHANGE UP (ref 68–114)
GLUCOSE SERPL-MCNC: 79 MG/DL — SIGNIFICANT CHANGE UP (ref 70–99)
HCT VFR BLD CALC: 37.5 % — SIGNIFICANT CHANGE UP (ref 34.5–45)
HGB BLD-MCNC: 11.7 G/DL — SIGNIFICANT CHANGE UP (ref 11.5–15.5)
IMM GRANULOCYTES NFR BLD AUTO: 0.5 % — SIGNIFICANT CHANGE UP (ref 0–0.9)
LYMPHOCYTES # BLD AUTO: 1.69 K/UL — SIGNIFICANT CHANGE UP (ref 1–3.3)
LYMPHOCYTES # BLD AUTO: 28.9 % — SIGNIFICANT CHANGE UP (ref 13–44)
MCHC RBC-ENTMCNC: 26.1 PG — LOW (ref 27–34)
MCHC RBC-ENTMCNC: 31.2 G/DL — LOW (ref 32–36)
MCV RBC AUTO: 83.5 FL — SIGNIFICANT CHANGE UP (ref 80–100)
MONOCYTES # BLD AUTO: 0.62 K/UL — SIGNIFICANT CHANGE UP (ref 0–0.9)
MONOCYTES NFR BLD AUTO: 10.6 % — SIGNIFICANT CHANGE UP (ref 2–14)
NEUTROPHILS # BLD AUTO: 3.39 K/UL — SIGNIFICANT CHANGE UP (ref 1.8–7.4)
NEUTROPHILS NFR BLD AUTO: 58.1 % — SIGNIFICANT CHANGE UP (ref 43–77)
PLATELET # BLD AUTO: 298 K/UL — SIGNIFICANT CHANGE UP (ref 150–400)
POTASSIUM SERPL-MCNC: 4.7 MMOL/L — SIGNIFICANT CHANGE UP (ref 3.5–5.3)
POTASSIUM SERPL-SCNC: 4.7 MMOL/L — SIGNIFICANT CHANGE UP (ref 3.5–5.3)
RBC # BLD: 4.49 M/UL — SIGNIFICANT CHANGE UP (ref 3.8–5.2)
RBC # FLD: 13.5 % — SIGNIFICANT CHANGE UP (ref 10.3–14.5)
SODIUM SERPL-SCNC: 144 MMOL/L — SIGNIFICANT CHANGE UP (ref 135–145)
WBC # BLD: 5.84 K/UL — SIGNIFICANT CHANGE UP (ref 3.8–10.5)
WBC # FLD AUTO: 5.84 K/UL — SIGNIFICANT CHANGE UP (ref 3.8–10.5)

## 2025-01-31 PROCEDURE — 93005 ELECTROCARDIOGRAM TRACING: CPT

## 2025-01-31 PROCEDURE — 36415 COLL VENOUS BLD VENIPUNCTURE: CPT

## 2025-01-31 PROCEDURE — 93010 ELECTROCARDIOGRAM REPORT: CPT

## 2025-01-31 PROCEDURE — G0463: CPT

## 2025-01-31 PROCEDURE — 83036 HEMOGLOBIN GLYCOSYLATED A1C: CPT

## 2025-01-31 PROCEDURE — 80048 BASIC METABOLIC PNL TOTAL CA: CPT

## 2025-01-31 PROCEDURE — 85025 COMPLETE CBC W/AUTO DIFF WBC: CPT

## 2025-02-14 RX ORDER — OXYCODONE 5 MG/1
5 TABLET ORAL
Qty: 28 | Refills: 0 | Status: ACTIVE | COMMUNITY
Start: 2025-02-14 | End: 1900-01-01

## 2025-02-18 ENCOUNTER — APPOINTMENT (OUTPATIENT)
Dept: ORTHOPEDIC SURGERY | Facility: AMBULATORY SURGERY CENTER | Age: 65
End: 2025-02-18

## 2025-02-18 PROCEDURE — 23430 REPAIR BICEPS TENDON: CPT | Mod: AS,RT

## 2025-02-18 PROCEDURE — 29827 SHO ARTHRS SRG RT8TR CUF RPR: CPT | Mod: RT

## 2025-02-18 PROCEDURE — 29827 SHO ARTHRS SRG RT8TR CUF RPR: CPT | Mod: AS,RT

## 2025-02-18 PROCEDURE — 29826 SHO ARTHRS SRG DECOMPRESSION: CPT | Mod: AS,RT

## 2025-02-18 PROCEDURE — 29826 SHO ARTHRS SRG DECOMPRESSION: CPT | Mod: RT

## 2025-02-18 PROCEDURE — 23430 REPAIR BICEPS TENDON: CPT | Mod: RT

## 2025-02-28 ENCOUNTER — NON-APPOINTMENT (OUTPATIENT)
Age: 65
End: 2025-02-28

## 2025-03-03 ENCOUNTER — APPOINTMENT (OUTPATIENT)
Dept: ORTHOPEDIC SURGERY | Facility: CLINIC | Age: 65
End: 2025-03-03
Payer: MEDICARE

## 2025-03-03 DIAGNOSIS — M25.511 PAIN IN RIGHT SHOULDER: ICD-10-CM

## 2025-03-03 PROCEDURE — 99024 POSTOP FOLLOW-UP VISIT: CPT

## 2025-03-31 ENCOUNTER — APPOINTMENT (OUTPATIENT)
Dept: ORTHOPEDIC SURGERY | Facility: CLINIC | Age: 65
End: 2025-03-31
Payer: MEDICARE

## 2025-03-31 DIAGNOSIS — M25.511 PAIN IN RIGHT SHOULDER: ICD-10-CM

## 2025-03-31 PROCEDURE — 99024 POSTOP FOLLOW-UP VISIT: CPT

## 2025-04-01 ENCOUNTER — NON-APPOINTMENT (OUTPATIENT)
Age: 65
End: 2025-04-01

## 2025-05-19 ENCOUNTER — APPOINTMENT (OUTPATIENT)
Dept: ORTHOPEDIC SURGERY | Facility: CLINIC | Age: 65
End: 2025-05-19
Payer: MEDICARE

## 2025-05-19 DIAGNOSIS — Z98.890 OTHER SPECIFIED POSTPROCEDURAL STATES: ICD-10-CM

## 2025-05-19 PROCEDURE — 99213 OFFICE O/P EST LOW 20 MIN: CPT | Mod: 24

## 2025-05-19 RX ORDER — METHYLPREDNISOLONE 4 MG/1
4 TABLET ORAL
Qty: 1 | Refills: 0 | Status: ACTIVE | COMMUNITY
Start: 2025-05-19 | End: 1900-01-01

## 2025-05-28 NOTE — ED ADULT TRIAGE NOTE - NS ED TRIAGE AVPU SCALE
LOV 4/21/25  NOV 4/22/26   Alert-The patient is alert, awake and responds to voice. The patient is oriented to time, place, and person. The triage nurse is able to obtain subjective information.

## 2025-06-06 NOTE — ED ADULT TRIAGE NOTE - RESPIRATORY RATE (BREATHS/MIN)
Inquiring that pcp maintains until establishes care with Urology on 6/23/25. Originally prescribed within hospital.     Reason for call:   [x] Refill   [] Prior Auth  [] Other:     Office:   [x] PCP/Provider - Maico Lopez,    [] Specialty/Provider -     Medication: tamsulosin (FLOMAX) 0.4 mg / Take 1 capsule (0.4 mg total) by mouth daily with dinner for 3 days     Pharmacy: [facility]/pharmacy #[id number] - [place], [place] - [address] [facility]   Does the patient have enough for 3 days?   [x] Yes   [] No - Send as HP to POD    
18

## 2025-08-20 ENCOUNTER — APPOINTMENT (OUTPATIENT)
Dept: ORTHOPEDIC SURGERY | Facility: CLINIC | Age: 65
End: 2025-08-20
Payer: MEDICARE

## 2025-08-20 VITALS
BODY MASS INDEX: 25.11 KG/M2 | WEIGHT: 133 LBS | SYSTOLIC BLOOD PRESSURE: 132 MMHG | HEIGHT: 61 IN | DIASTOLIC BLOOD PRESSURE: 85 MMHG

## 2025-08-20 DIAGNOSIS — Z98.890 OTHER SPECIFIED POSTPROCEDURAL STATES: ICD-10-CM

## 2025-08-20 PROCEDURE — 99213 OFFICE O/P EST LOW 20 MIN: CPT

## 2025-09-11 ENCOUNTER — APPOINTMENT (OUTPATIENT)
Dept: OBGYN | Facility: CLINIC | Age: 65
End: 2025-09-11
Payer: MEDICARE

## 2025-09-11 VITALS
HEIGHT: 61 IN | DIASTOLIC BLOOD PRESSURE: 70 MMHG | SYSTOLIC BLOOD PRESSURE: 124 MMHG | BODY MASS INDEX: 25.3 KG/M2 | WEIGHT: 134 LBS

## 2025-09-11 DIAGNOSIS — Z01.419 ENCOUNTER FOR GYNECOLOGICAL EXAMINATION (GENERAL) (ROUTINE) W/OUT ABNORMAL FINDINGS: ICD-10-CM

## 2025-09-11 DIAGNOSIS — Z91.89 OTHER SPECIFIED PERSONAL RISK FACTORS, NOT ELSEWHERE CLASSIFIED: ICD-10-CM

## 2025-09-11 PROCEDURE — G0101: CPT
